# Patient Record
Sex: FEMALE | Race: WHITE | NOT HISPANIC OR LATINO | Employment: FULL TIME | URBAN - METROPOLITAN AREA
[De-identification: names, ages, dates, MRNs, and addresses within clinical notes are randomized per-mention and may not be internally consistent; named-entity substitution may affect disease eponyms.]

---

## 2017-01-26 ENCOUNTER — PATIENT MESSAGE (OUTPATIENT)
Dept: ENDOCRINOLOGY | Facility: CLINIC | Age: 62
End: 2017-01-26

## 2017-01-27 ENCOUNTER — TELEPHONE (OUTPATIENT)
Dept: ENDOCRINOLOGY | Facility: HOSPITAL | Age: 62
End: 2017-01-27

## 2017-01-27 DIAGNOSIS — E23.0 HYPOPITUITARISM: ICD-10-CM

## 2017-01-27 DIAGNOSIS — E27.40 ADRENAL INSUFFICIENCY: Primary | ICD-10-CM

## 2017-02-14 ENCOUNTER — LAB VISIT (OUTPATIENT)
Dept: LAB | Facility: HOSPITAL | Age: 62
End: 2017-02-14
Attending: INTERNAL MEDICINE
Payer: COMMERCIAL

## 2017-02-14 DIAGNOSIS — E23.0 HYPOPITUITARISM: ICD-10-CM

## 2017-02-14 DIAGNOSIS — E27.40 ADRENAL INSUFFICIENCY: ICD-10-CM

## 2017-02-14 LAB
25(OH)D3+25(OH)D2 SERPL-MCNC: 57 NG/ML
ALBUMIN SERPL BCP-MCNC: 3.9 G/DL
ALP SERPL-CCNC: 74 U/L
ALT SERPL W/O P-5'-P-CCNC: 10 U/L
ANION GAP SERPL CALC-SCNC: 8 MMOL/L
AST SERPL-CCNC: 16 U/L
BILIRUB SERPL-MCNC: 0.4 MG/DL
BUN SERPL-MCNC: 21 MG/DL
CALCIUM SERPL-MCNC: 9.3 MG/DL
CHLORIDE SERPL-SCNC: 106 MMOL/L
CO2 SERPL-SCNC: 27 MMOL/L
CORTIS SERPL-MCNC: 4 UG/DL
CREAT SERPL-MCNC: 0.9 MG/DL
EST. GFR  (AFRICAN AMERICAN): >60 ML/MIN/1.73 M^2
EST. GFR  (NON AFRICAN AMERICAN): >60 ML/MIN/1.73 M^2
GLUCOSE SERPL-MCNC: 73 MG/DL
POTASSIUM SERPL-SCNC: 3.8 MMOL/L
PROT SERPL-MCNC: 6.6 G/DL
SODIUM SERPL-SCNC: 141 MMOL/L
T4 FREE SERPL-MCNC: 1.56 NG/DL
TSH SERPL DL<=0.005 MIU/L-ACNC: 3.16 UIU/ML

## 2017-02-14 PROCEDURE — 36415 COLL VENOUS BLD VENIPUNCTURE: CPT

## 2017-02-14 PROCEDURE — 82533 TOTAL CORTISOL: CPT

## 2017-02-14 PROCEDURE — 84244 ASSAY OF RENIN: CPT

## 2017-02-14 PROCEDURE — 84305 ASSAY OF SOMATOMEDIN: CPT

## 2017-02-14 PROCEDURE — 82024 ASSAY OF ACTH: CPT

## 2017-02-14 PROCEDURE — 82306 VITAMIN D 25 HYDROXY: CPT

## 2017-02-14 PROCEDURE — 84443 ASSAY THYROID STIM HORMONE: CPT

## 2017-02-14 PROCEDURE — 80053 COMPREHEN METABOLIC PANEL: CPT

## 2017-02-14 PROCEDURE — 82088 ASSAY OF ALDOSTERONE: CPT

## 2017-02-14 PROCEDURE — 84439 ASSAY OF FREE THYROXINE: CPT

## 2017-02-15 LAB
ALDOST SERPL-MCNC: 3.2 NG/DL
IGF-I SERPL-MCNC: 98 NG/ML

## 2017-02-16 LAB — RENIN PLAS-CCNC: 4.3 NG/ML/H

## 2017-02-17 LAB — ACTH PLAS-MCNC: 16 PG/ML

## 2017-02-21 ENCOUNTER — OFFICE VISIT (OUTPATIENT)
Dept: ENDOCRINOLOGY | Facility: CLINIC | Age: 62
End: 2017-02-21
Payer: COMMERCIAL

## 2017-02-21 VITALS
HEART RATE: 76 BPM | BODY MASS INDEX: 28.61 KG/M2 | DIASTOLIC BLOOD PRESSURE: 78 MMHG | HEIGHT: 60 IN | SYSTOLIC BLOOD PRESSURE: 122 MMHG | WEIGHT: 145.75 LBS

## 2017-02-21 DIAGNOSIS — E27.1 ADRENAL INSUFFICIENCY (ADDISON'S DISEASE): Primary | ICD-10-CM

## 2017-02-21 DIAGNOSIS — D18.03 HEMANGIOMA OF LIVER: ICD-10-CM

## 2017-02-21 DIAGNOSIS — E23.0 PANHYPOPITUITARISM: ICD-10-CM

## 2017-02-21 DIAGNOSIS — M81.8 OSTEOPOROSIS, IDIOPATHIC: ICD-10-CM

## 2017-02-21 DIAGNOSIS — E89.0 HYPOTHYROIDISM, POSTSURGICAL: ICD-10-CM

## 2017-02-21 PROCEDURE — 99999 PR PBB SHADOW E&M-EST. PATIENT-LVL III: CPT | Mod: PBBFAC,,, | Performed by: INTERNAL MEDICINE

## 2017-02-21 PROCEDURE — 99214 OFFICE O/P EST MOD 30 MIN: CPT | Mod: S$GLB,,, | Performed by: INTERNAL MEDICINE

## 2017-02-21 RX ORDER — CHOLECALCIFEROL (VITAMIN D3) 25 MCG
10000 TABLET ORAL DAILY
COMMUNITY

## 2017-02-21 RX ORDER — LEVOTHYROXINE SODIUM 88 UG/1
88 TABLET ORAL DAILY
Qty: 30 TABLET | Refills: 11 | Status: SHIPPED | OUTPATIENT
Start: 2017-02-21 | End: 2018-02-19 | Stop reason: SDUPTHER

## 2017-02-21 RX ORDER — PREDNISONE 5 MG/1
TABLET ORAL
Qty: 48 TABLET | Refills: 11 | Status: SHIPPED | OUTPATIENT
Start: 2017-02-21 | End: 2017-03-07

## 2017-02-21 RX ORDER — ALENDRONATE SODIUM 70 MG/1
TABLET ORAL
Qty: 4 TABLET | Refills: 12 | Status: SHIPPED | OUTPATIENT
Start: 2017-02-21 | End: 2018-02-19 | Stop reason: SDUPTHER

## 2017-02-21 RX ORDER — FLUDROCORTISONE ACETATE 0.1 MG/1
100 TABLET ORAL DAILY
Qty: 30 TABLET | Refills: 11 | Status: SHIPPED | OUTPATIENT
Start: 2017-02-21 | End: 2018-02-19 | Stop reason: SDUPTHER

## 2017-02-21 NOTE — PATIENT INSTRUCTIONS
Endocrine  Bilateral adrenalectomy and Pituitary radiation  Thyroidectomy  Currently FSH normal last year  TSH normal   ACTH perhaps low  IGF1 is now normal  In past Growth def  Empty sella with MRI 2012  Continue prednisone 5 and occasionally 7.5  Florinef .1 3days per week or .05 4 days per week  Levothyroxine 88mcg    Osteoporosis recent foot fracture   To check BMD a    DHEA-S she feels much better and may help bone, but certainly the mood    Hemangioma liver PCP    Restless legs still a problem

## 2017-02-21 NOTE — PROGRESS NOTES
Subjective:      Patient ID: Tess Duncan is a 62 y.o. female.    Chief Complaint:  Adrenal Insufficiency      History of Present Illness   Patient was feeling disconnected and now taking DHEA-S 10-15 and now feeling much better  Recent fracture right foot and slow healing  Tingling legs both when seeing at night. Improves with walking    The patient has the following problems:   1. Status post Cushing's disease with bilateral adrenalectomy.   2. Radiation to the pituitary to avoid Dakota syndrome.   3. Growth hormone deficiency. Last IGF1 55  4. Menopause. Age 55 y/o   5. Osteoporosis and scoliosis.   6. Status post thyroidectomy for multinodular goiter and hypothyroidism   and exogenous hyperthyroidism. Father with thyroid cancer   7. Large hemangioma in the liver. Checked 2014 no change   8 Hypoglycemia thinks can tell and takes higher dose of prednisone   9. Depression. Markedly improved.   10. Restless legs  In regards to the Cushing's and bilateral adrenalectomy, she is currently   taking prednisone 5- 7.5 mg per day mg per day. We have stoppedthe growth hormone and rechecked the growth   hormone levels which were low normal. Osteoporosis and No fractures.  Not taking alendronate    Review of Systems   Constitutional: Negative for fatigue and unexpected weight change.   HENT: Negative for hearing loss.    Eyes: Negative for visual disturbance.   Respiratory: Negative for cough and shortness of breath.    Cardiovascular: Negative for chest pain and palpitations.   Gastrointestinal: Negative for constipation and diarrhea.   Neurological: Negative for headaches.   Psychiatric/Behavioral: The patient is not nervous/anxious.        Objective:   Physical Exam   Constitutional: She is oriented to person, place, and time. She appears well-developed and well-nourished.   Neck: No thyromegaly present.   Cardiovascular: Normal rate, regular rhythm and normal heart sounds.    No murmur heard.  Pulmonary/Chest: Effort  normal and breath sounds normal.   Abdominal: Soft. Bowel sounds are normal.   Musculoskeletal:   Boot right foot   Lymphadenopathy:     She has no cervical adenopathy.   Neurological: She is alert and oriented to person, place, and time. She has normal reflexes.   Vibration intact left foot  Pulses normal   Vitals reviewed.      Lab Review:   Results for orders placed or performed in visit on 02/14/17   ACTH   Result Value Ref Range    ACTH 16 0 - 46 pg/mL   Renin   Result Value Ref Range    Renin Activity 4.3 ng/mL/h   Aldosterone   Result Value Ref Range    Aldosterone 3.2 ng/dL   Cortisol   Result Value Ref Range    Cortisol 4.0 ug/dL   Insulin-like growth factor   Result Value Ref Range    Somatomedin (IGF-I) 98 81 - 225 ng/mL   T4, free   Result Value Ref Range    Free T4 1.56 (H) 0.71 - 1.51 ng/dL   TSH   Result Value Ref Range    TSH 3.156 0.400 - 4.000 uIU/mL   Comprehensive metabolic panel   Result Value Ref Range    Sodium 141 136 - 145 mmol/L    Potassium 3.8 3.5 - 5.1 mmol/L    Chloride 106 95 - 110 mmol/L    CO2 27 23 - 29 mmol/L    Glucose 73 70 - 110 mg/dL    BUN, Bld 21 (H) 6 - 20 mg/dL    Creatinine 0.9 0.5 - 1.4 mg/dL    Calcium 9.3 8.7 - 10.5 mg/dL    Total Protein 6.6 6.0 - 8.4 g/dL    Albumin 3.9 3.5 - 5.2 g/dL    Total Bilirubin 0.4 0.1 - 1.0 mg/dL    Alkaline Phosphatase 74 55 - 135 U/L    AST 16 10 - 40 U/L    ALT 10 10 - 44 U/L    Anion Gap 8 8 - 16 mmol/L    eGFR if African American >60.0 >60 mL/min/1.73 m^2    eGFR if non African American >60.0 >60 mL/min/1.73 m^2   Vitamin D   Result Value Ref Range    Vit D, 25-Hydroxy 57 30 - 96 ng/mL         Assessment:     Endocrine  Bilateral adrenalectomy and Pituitary radiation  Thyroidectomy  Currently FSH normal last year  TSH normal   ACTH perhaps low  IGF1 is now normal  In past Growth def  Empty sella with MRI 2012  Continue prednisone 5 and occasionally 7.5  Florinef .1 3days per week or .05 4 days per week  Levothyroxine  88mcg    Osteoporosis recent foot fracture   To check BMD  Then decide about adding alendronate    DHEA-S she feels much better and may help bone, but certainly the mood    Hemangioma liver PCP    Restless legs still a problem      F/U Dr. Fitch    Plan:     Orders Placed This Encounter   Procedures    DXA Bone Density Spine And Hip_Axial Skeleton     Standing Status:   Future     Standing Expiration Date:   2/21/2018    MRI Brain W WO Contrast     Standing Status:   Future     Standing Expiration Date:   2/21/2018     Order Specific Question:   Does the patient have a pacemaker or a defibrilator?     Answer:   No     Order Specific Question:   Does the patient have a cerebral aneurysm or surgical clip, pump, nerve or brain stimulator, middle or inner ear prosthesis, or other metal implant or  been injured by a metal object(i.e. bullet, bb, shrapnel)?     Answer:   No     Order Specific Question:   Is the patient claustrophobic?     Answer:   No     Order Specific Question:   Will the patient require sedation?     Answer:   No     Order Specific Question:   Does the patient have any of the following conditions? Diabetes, History of Renal Disease or Hypertension requiring medical therapy?     Answer:   No     Order Specific Question:   Is the patient pregnant?     Answer:   No     Order Specific Question:   May the Radiologist modify the order per protocol to meet the clinical needs of the patient?     Answer:   Yes     Order Specific Question:   Is this part of a Research Study?     Answer:   No     Order Specific Question:   Recist criteria?     Answer:   No     Order Specific Question:   Will this service be billed to a Worker's Comp policy?     Answer:   No     Order Specific Question:   Does the patient have on a skin patch for medication with aluminized backing?     Answer:   No    Comprehensive metabolic panel     Standing Status:   Future     Standing Expiration Date:   2/21/2018    Cortisol     Standing  Status:   Future     Standing Expiration Date:   2/21/2018    ACTH     Standing Status:   Future     Standing Expiration Date:   2/21/2018    TSH     Standing Status:   Future     Standing Expiration Date:   2/21/2018    T4, free     Standing Status:   Future     Standing Expiration Date:   2/21/2018    Renin     Standing Status:   Future     Standing Expiration Date:   2/21/2018    Follicle stimulating hormone     Standing Status:   Future     Standing Expiration Date:   2/21/2018    Prolactin     Standing Status:   Future     Standing Expiration Date:   2/21/2018    Insulin-like growth factor     Standing Status:   Future     Standing Expiration Date:   2/21/2018

## 2017-02-21 NOTE — MR AVS SNAPSHOT
[facility] - Endo/Diab/Metab  [address]  [facility] [address]  Phone: [phone]  Fax: [phone]                  Tess Duncan   2017 7:00 AM   Office Visit    Description:  Female : 1955   Provider:  Fco Pompa MD   Department:  [facility] - Endo/Diab/Metab           Reason for Visit     Adrenal Insufficiency           Diagnoses this Visit        Comments    Adrenal insufficiency (Peach's disease)    -  Primary     Panhypopituitarism         Osteoporosis, idiopathic         Hemangioma of liver         Hypothyroidism, postsurgical                To Do List           Future Appointments        Provider Department Dept Phone    2017 3:20 PM NOM, DEXA1 [facility]-Bone [facility]eral Density [phone]    3/3/2017 8:15 AM [facility] MRI WIDE BORE [facility] [phone]      Goals (5 Years of Data)     None      Follow-Up and Disposition     Follow-up and Disposition History       These Medications        Disp Refills Start End    alendronate (FOSAMAX) 70 MG tablet 4 tablet 12 2017     1 tablet on  with a full glass of water and wait 30 minutes for breakfast and pills. Take sitting or standing    Pharmacy: [facility] [address] AT [address] [place] [address] Ph #: [phone]       predniSONE (DELTASONE) 5 MG tablet 48 tablet 11 2017     1 tab in AM and 1/2 tab in PM    Pharmacy: [address] AT [address] [place] [address] Ph #: [phone]       levothyroxine (SYNTHROID) 88 MCG tablet 30 tablet 11 2017    Take 1 tablet (88 mcg total) by mouth once daily. - Oral    Pharmacy: [address] AT [address] [place] [address] Ph #: [phone]       fludrocortisone (FLORINEF) 0.1 mg Tab 30 tablet 11 2017     Take 1 tablet (100 mcg total) by mouth once daily. - Oral    Pharmacy: [facility]  [address] [place], [place] - [address] AT [address] [place]  [address] Ph #: [phone]         [facility] On Call     [facility] On Call Nurse Care Line -  Assistance  Registered nurses in the [facility] On Call Center provide clinical advisement, health education, appointment booking, and other advisory services.  Call for this free service at [phone].             Medications           Message regarding Medications     Verify the changes and/or additions to your medication regime listed below are the same as discussed with your clinician today.  If any of these changes or additions are incorrect, please notify your healthcare provider.        START taking these NEW medications        Refills    alendronate (FOSAMAX) 70 MG tablet 12    Si tablet on  with a full glass of water and wait 30 minutes for breakfast and pills. Take sitting or standing    Class: Normal      CHANGE how you are taking these medications     Start Taking Instead of    fludrocortisone (FLORINEF) 0.1 mg Tab fludrocortisone (FLORINEF) 0.1 mg Tab    Dosage:  Take 1 tablet (100 mcg total) by mouth once daily. Dosage:  TAKE ONE TABLET BY MOUTH EVERY DAY.    Reason for Change:  Reorder            Verify that the below list of medications is an accurate representation of the medications you are currently taking.  If none reported, the list may be blank. If incorrect, please contact your healthcare provider. Carry this list with you in case of emergency.           Current Medications     calcium carbonate (CORAL CALCIUM) 390 mg  (1,000 mg) Tab Take by mouth.    fludrocortisone (FLORINEF) 0.1 mg Tab Take 1 tablet (100 mcg total) by mouth once daily.    L.acidophilus-Bif. animalis (PROBIOTIC) 5 billion cell CpSP Take by mouth once daily.    levothyroxine (SYNTHROID) 88 MCG tablet Take 1 tablet (88 mcg total) by mouth once daily.    multivitamin capsule Take 1 capsule by mouth once daily.    predniSONE (DELTASONE) 5 MG tablet 1 tab in AM and  1/2 tab in PM    vitamin D 1000 units Tab Take 185 mg by mouth once daily.    alendronate (FOSAMAX) 70 MG tablet 1 tablet on Sunday with a full glass of water and wait 30 minutes for breakfast and pills. Take sitting or standing           Clinical Reference Information           Your Vitals Were     BP Pulse Height Weight BMI    122/78 76 5' (1.524 m) 66.1 kg (145 lb 11.6 oz) 28.46 kg/m2      Blood Pressure          Most Recent Value    BP  122/78      Allergies as of 2/21/2017     Aleve  [Naproxen Sodium]    Codeine    Iodinated Contrast Media - Iv Dye    Pcn [Penicillins]    Phenytoin Sodium Extended      Immunizations Administered on Date of Encounter - 2/21/2017     None      Orders Placed During Today's Visit     Future Labs/Procedures Expected by Expires    ACTH  2/21/2017 2/21/2018    Comprehensive metabolic panel  2/21/2017 2/21/2018    Cortisol  2/21/2017 2/21/2018    DXA Bone Density Spine And Hip_Axial Skeleton  2/21/2017 2/21/2018    Follicle stimulating hormone  2/21/2017 2/21/2018    Insulin-like growth factor  2/21/2017 2/21/2018    MRI Brain W WO Contrast  2/21/2017 2/21/2018    Prolactin  2/21/2017 2/21/2018    Renin  2/21/2017 2/21/2018    T4, free  2/21/2017 2/21/2018    TSH  2/21/2017 2/21/2018      Instructions    Endocrine  Bilateral adrenalectomy and Pituitary radiation  Thyroidectomy  Currently FSH normal last year  TSH normal   ACTH perhaps low  IGF1 is now normal  In past Growth def  Empty sella with MRI 2012  Continue prednisone 5 and occasionally 7.5  Florinef .1 3days per week or .05 4 days per week  Levothyroxine 88mcg    Osteoporosis recent foot fracture   To check BMD a    DHEA-S she feels much better and may help bone, but certainly the mood    Hemangioma liver PCP    Restless legs still a problem       Language Assistance Services     ATTENTION: Language assistance services are available, free of charge. Please call [phone].      ATENCIÓN: anabel Gerber  disposición servicios gratuitos de asistencia lingüística. Swapnil al [phone].     JILLIAN Ý: N?u b?n nói Ti?ng Vi?t, có các d?ch v? h? tr? ngôn ng? mi?n phí dành cho b?n. G?i s? [phone].         [facility] complies with applicable Federal civil rights laws and does not discriminate on the basis of race, color, national origin, age, disability, or sex.

## 2017-02-23 ENCOUNTER — HOSPITAL ENCOUNTER (OUTPATIENT)
Dept: RADIOLOGY | Facility: CLINIC | Age: 62
Discharge: HOME | End: 2017-02-23
Attending: INTERNAL MEDICINE
Payer: COMMERCIAL

## 2017-02-23 ENCOUNTER — PATIENT MESSAGE (OUTPATIENT)
Dept: ENDOCRINOLOGY | Facility: CLINIC | Age: 62
End: 2017-02-23

## 2017-02-23 DIAGNOSIS — M81.8 OSTEOPOROSIS, IDIOPATHIC: ICD-10-CM

## 2017-02-23 PROCEDURE — 77080 DXA BONE DENSITY AXIAL: CPT | Mod: TC

## 2017-02-23 PROCEDURE — 77080 DXA BONE DENSITY AXIAL: CPT | Mod: 26,,, | Performed by: INTERNAL MEDICINE

## 2017-03-01 ENCOUNTER — PATIENT MESSAGE (OUTPATIENT)
Dept: ENDOCRINOLOGY | Facility: CLINIC | Age: 62
End: 2017-03-01

## 2017-03-01 DIAGNOSIS — I67.1 MIDDLE CEREBRAL ANEURYSM: Primary | ICD-10-CM

## 2017-03-03 ENCOUNTER — HOSPITAL ENCOUNTER (OUTPATIENT)
Dept: RADIOLOGY | Facility: HOSPITAL | Age: 62
Discharge: HOME | End: 2017-03-03
Attending: INTERNAL MEDICINE
Payer: COMMERCIAL

## 2017-03-03 ENCOUNTER — TELEPHONE (OUTPATIENT)
Dept: ENDOCRINOLOGY | Facility: CLINIC | Age: 62
End: 2017-03-03

## 2017-03-03 DIAGNOSIS — I67.1 ANEURYSM, CEREBRAL: Primary | ICD-10-CM

## 2017-03-03 DIAGNOSIS — E23.0 PANHYPOPITUITARISM: ICD-10-CM

## 2017-03-03 PROCEDURE — 70553 MRI BRAIN STEM W/O & W/DYE: CPT | Mod: TC

## 2017-03-03 PROCEDURE — 25500020 PHARM REV CODE 255: Performed by: INTERNAL MEDICINE

## 2017-03-03 PROCEDURE — 70553 MRI BRAIN STEM W/O & W/DYE: CPT | Mod: 26,,, | Performed by: RADIOLOGY

## 2017-03-03 PROCEDURE — A9585 GADOBUTROL INJECTION: HCPCS | Performed by: INTERNAL MEDICINE

## 2017-03-03 RX ORDER — GADOBUTROL 604.72 MG/ML
4 INJECTION INTRAVENOUS
Status: COMPLETED | OUTPATIENT
Start: 2017-03-03 | End: 2017-03-03

## 2017-03-03 RX ADMIN — GADOBUTROL 4 ML: 604.72 INJECTION INTRAVENOUS at 09:03

## 2017-03-03 NOTE — TELEPHONE ENCOUNTER
Please note patient has allergy to radiology dye which is severe with hives etc.  Need to talk with radiology about steroid prep etc and any other alternative.  She had urticaria even with steroid prep lasttime.  Cancel the order that said no allergies and schedule the one with allergies and contact radiologist about test for this patient

## 2017-03-03 NOTE — TELEPHONE ENCOUNTER
Unable to reach on mobile phone.  New finding of possible thrombosed aneurysm.  To order CT angiogram of brain  Check with radiology correct test

## 2017-03-07 ENCOUNTER — TELEPHONE (OUTPATIENT)
Dept: ENDOCRINOLOGY | Facility: CLINIC | Age: 62
End: 2017-03-07

## 2017-03-07 ENCOUNTER — PATIENT MESSAGE (OUTPATIENT)
Dept: ENDOCRINOLOGY | Facility: CLINIC | Age: 62
End: 2017-03-07

## 2017-03-07 RX ORDER — PREDNISONE 5 MG/1
TABLET ORAL
Qty: 48 TABLET | Refills: 6 | Status: SHIPPED | OUTPATIENT
Start: 2017-03-07 | End: 2018-02-19 | Stop reason: SDUPTHER

## 2017-03-07 NOTE — TELEPHONE ENCOUNTER
"Spoke with Radiology pt has to take "50mg Prednisone 13 hrs prior, 50mg Prednisone 7hrs prior, and 50mg Prednisone 1hour prior, plus 50mg Benedryl 1 hour prior to Ct Scan on 3/15 at 5pm. LVM for pt that med would be sent to her preferred pharmacy,[facility]. Msg forwarded to Dr. Pompa. DH  "

## 2017-03-08 RX ORDER — PREDNISONE 50 MG/1
TABLET ORAL
Qty: 3 TABLET | Refills: 0 | Status: SHIPPED | OUTPATIENT
Start: 2017-03-08 | End: 2017-03-30 | Stop reason: ALTCHOICE

## 2017-03-13 ENCOUNTER — PATIENT MESSAGE (OUTPATIENT)
Dept: ENDOCRINOLOGY | Facility: CLINIC | Age: 62
End: 2017-03-13

## 2017-03-15 ENCOUNTER — HOSPITAL ENCOUNTER (OUTPATIENT)
Dept: RADIOLOGY | Facility: HOSPITAL | Age: 62
Discharge: HOME | End: 2017-03-15
Attending: INTERNAL MEDICINE
Payer: COMMERCIAL

## 2017-03-15 DIAGNOSIS — I67.1 MIDDLE CEREBRAL ANEURYSM: ICD-10-CM

## 2017-03-15 LAB
CREAT SERPL-MCNC: 0.7 MG/DL (ref 0.5–1.4)
SAMPLE: NORMAL

## 2017-03-15 PROCEDURE — 70498 CT ANGIOGRAPHY NECK: CPT | Mod: TC

## 2017-03-15 PROCEDURE — 70498 CT ANGIOGRAPHY NECK: CPT | Mod: 26,,, | Performed by: RADIOLOGY

## 2017-03-15 PROCEDURE — 70496 CT ANGIOGRAPHY HEAD: CPT | Mod: 26,,, | Performed by: RADIOLOGY

## 2017-03-15 PROCEDURE — 25500020 PHARM REV CODE 255: Performed by: INTERNAL MEDICINE

## 2017-03-15 PROCEDURE — 70496 CT ANGIOGRAPHY HEAD: CPT | Mod: TC

## 2017-03-15 RX ADMIN — IOHEXOL 75 ML: 350 INJECTION, SOLUTION INTRAVENOUS at 07:03

## 2017-03-16 ENCOUNTER — TELEPHONE (OUTPATIENT)
Dept: ENDOCRINOLOGY | Facility: CLINIC | Age: 62
End: 2017-03-16

## 2017-03-16 ENCOUNTER — PATIENT MESSAGE (OUTPATIENT)
Dept: ENDOCRINOLOGY | Facility: CLINIC | Age: 62
End: 2017-03-16

## 2017-03-16 DIAGNOSIS — I72.9 ANEURYSM: Primary | ICD-10-CM

## 2017-03-16 NOTE — TELEPHONE ENCOUNTER
The following lab tests are abnormal:  The CTA does not suggest aneurysm.  To have you see neurosurgeon to get their opinion

## 2017-03-24 ENCOUNTER — TELEPHONE (OUTPATIENT)
Dept: NEUROSURGERY | Facility: CLINIC | Age: 62
End: 2017-03-24

## 2017-03-24 NOTE — TELEPHONE ENCOUNTER
----- Message from Moe Bauer sent at 3/24/2017 10:02 AM CDT -----  Contact: PT  Deyvi green, [phone]

## 2017-03-30 ENCOUNTER — OFFICE VISIT (OUTPATIENT)
Dept: NEUROSURGERY | Facility: CLINIC | Age: 62
End: 2017-03-30
Payer: COMMERCIAL

## 2017-03-30 VITALS
HEART RATE: 93 BPM | BODY MASS INDEX: 27.93 KG/M2 | SYSTOLIC BLOOD PRESSURE: 135 MMHG | TEMPERATURE: 99 F | DIASTOLIC BLOOD PRESSURE: 87 MMHG | WEIGHT: 143 LBS

## 2017-03-30 DIAGNOSIS — R20.0 BILATERAL LEG NUMBNESS: ICD-10-CM

## 2017-03-30 DIAGNOSIS — I67.1 CEREBRAL ANEURYSM: Primary | ICD-10-CM

## 2017-03-30 PROCEDURE — 1160F RVW MEDS BY RX/DR IN RCRD: CPT | Mod: S$GLB,,, | Performed by: NEUROLOGICAL SURGERY

## 2017-03-30 PROCEDURE — 99204 OFFICE O/P NEW MOD 45 MIN: CPT | Mod: S$GLB,,, | Performed by: NEUROLOGICAL SURGERY

## 2017-03-30 PROCEDURE — 99999 PR PBB SHADOW E&M-EST. PATIENT-LVL III: CPT | Mod: PBBFAC,,, | Performed by: NEUROLOGICAL SURGERY

## 2017-03-30 NOTE — LETTER
March 30, 2017      Fco Pompa MD  [address]  [facility] [address]           [facility]  [address]  [facility] [address]  Phone: [phone]          Patient: Tess Duncan   MR Number: [id number]   YOB: 1955   Date of Visit: 3/30/2017       Dear Dr. Fco Pompa:    Thank you for referring Tess Duncan to me for evaluation. Attached you will find relevant portions of my assessment and plan of care.    If you have questions, please do not hesitate to call me. I look forward to following Tess Duncan along with you.    Sincerely,    Luca Alan MD    Enclosure  CC:  No Recipients    If you would like to receive this communication electronically, please contact [email or web address] or [phone] to request more information on [employer] access.    For providers and/or their staff who would like to refer a patient to [facility], please contact us through our one-stop-shop provider referral line, [facility] [place], at [phone].    If you feel you have received this communication in error or would no longer like to receive these types of communications, please e-mail [email or web address]

## 2017-03-30 NOTE — MR AVS SNAPSHOT
[facility] - Neuro[facility] 7th Fl  [address]  [facility] [address]  Phone: [phone]                  Tess Duncan   3/30/2017 9:00 AM   Office Visit    Description:  Female : 1955   Provider:  Luca Alan MD   Department:  [facility] - Neurosurgery 7th Fl           Diagnoses this Visit        Comments    Cerebral aneurysm    -  Primary            To Do List           Future Appointments        Provider Department Dept Phone    3/31/2017 8:40 AM Julia Crawley MD [facility] Internal Medicine [phone]      Goals (5 Years of Data)     None      Follow-Up and Disposition     Return in about 6 months (around 2017) for MRI, MRA brain.      [facility] On Call     [facility] On Call Nurse Care Line -  Assistance  Unless otherwise directed by your provider, please contact [facility] On-Call, our nurse care line that is available for  assistance.     Registered nurses in the [facility] On Call Center provide: appointment scheduling, clinical advisement, health education, and other advisory services.  Call: [phone] (toll free)               Medications           Message regarding Medications     Verify the changes and/or additions to your medication regime listed below are the same as discussed with your clinician today.  If any of these changes or additions are incorrect, please notify your healthcare provider.        STOP taking these medications     diphenhydrAMINE (BENADRYL) 50 MG tablet 1 tablet 1 hour before CT scan           Verify that the below list of medications is an accurate representation of the medications you are currently taking.  If none reported, the list may be blank. If incorrect, please contact your healthcare provider. Carry this list with you in case of emergency.           Current Medications     alendronate (FOSAMAX) 70 MG tablet 1 tablet on  with a full glass of water and wait 30 minutes for breakfast and pills. Take sitting or standing     calcium carbonate (CORAL CALCIUM) 390 mg  (1,000 mg) Tab Take by mouth.    fludrocortisone (FLORINEF) 0.1 mg Tab Take 1 tablet (100 mcg total) by mouth once daily.    L.acidophilus-Bif. animalis (PROBIOTIC) 5 billion cell CpSP Take by mouth once daily.    levothyroxine (SYNTHROID) 88 MCG tablet Take 1 tablet (88 mcg total) by mouth once daily.    multivitamin capsule Take 1 capsule by mouth once daily.    predniSONE (DELTASONE) 5 MG tablet TAKE 1 TABLET BY MOUTH EVERY MORNING AND 1/2 TABLET EVERY EVENING    vitamin D 1000 units Tab Take 185 mg by mouth once daily.           Clinical Reference Information           Your Vitals Were     BP Pulse Temp Weight BMI    135/87 93 98.7 °F (37.1 °C) (Oral) 64.9 kg (143 lb) 27.93 kg/m2      Blood Pressure          Most Recent Value    BP  135/87      Allergies as of 3/30/2017     Iodinated Contrast Media - Iv Dye    Aleve  [Naproxen Sodium]    Codeine    Pcn [Penicillins]    Phenytoin Sodium Extended      Immunizations Administered on Date of Encounter - 3/30/2017     None      Orders Placed During Today's Visit     Future Labs/Procedures Expected by Expires    Creatinine, serum  3/30/2017 5/29/2018    MRA Brain with and without contrast  3/30/2017 3/30/2018    MRI Brain W WO Contrast  9/30/2017 3/30/2018      Language Assistance Services     ATTENTION: Language assistance services are available, free of charge. Please call [phone].      ATENCIÓN: Si habla español, tiene a harley disposición servicios gratuitos de asistencia lingüística. Llame al [phone].     [facility] Ý: N?u b?n nói Ti?ng Vi?t, có các d?ch v? h? tr? ngôn ng? mi?n phí dành cho b?n. G?i s? [phone].         [facility] [address] complies with applicable Federal civil rights laws and does not discriminate on the basis of race, color, national origin, age, disability, or sex.

## 2017-03-30 NOTE — PROGRESS NOTES
This office note has been dictated.  March 30, 2017    Fco Pompa M.D.  Department of Endocrinology  [facility]    RE:  TESS DUNCAN.  [facility]  [id number]    Dear Fco:    Tess Duncan was seen in neurosurgical consultation at the office this   morning.  As you know, she is a 62-year-old lady who was diagnosed with Cushing   disease in 1972.  She underwent pneumoencephalography at [place] and   subsequently underwent bilateral adrenalectomy and pituitary radiation.  She was   placed on Florinef and prednisone, which she has taken for all of these years.   However, she continued to have menstrual periods and did not go through   menopause until after she was 52.  She did develop thyroid nodules and had a   thyroidectomy.  You have followed her for many years and have been getting an   MRI of the brain to monitor the pituitary about every five years.  Her most   recent followup MRI was done on 03/03/17 showing a possible vascular lesion.  A   CTA was then done and she was referred for neurosurgical evaluation.  She has   had no significant headaches.  Vision is stable.  She has had cataract surgery.    She has no diplopia.  She feels her hearing is adequate.  She has had no   problems with speech or focal weakness.  She does complain of subjective   numbness, which began in her feet, but now is going up further on both legs.  It   is not a pins and needles paresthesia, but more of a sense of decreased   sensation.  There has been no definite weakness in the lower extremities.    Bladder and bowel function is apparently normal, although she has had repeated   urinary tract infections.  Past medical history is otherwise generally   unremarkable.  She has a known hemangioma of the liver.  She remains active and   review of systems is otherwise negative.    On neurological examination, she is a well-developed, well-nourished white lady   who is alert and cooperative.  Examination of the  head shows no tenderness over   the scalp.  Eyes show full extraocular movements.  There is no nystagmus.    Pupils are equal and reactive to light.  Fundi show clear disc margins.  Hearing   is intact to finger rubbing.  The neck is supple.  On neurological examination,   she is speaking clearly, provides a good history.  Affect is unremarkable.    Finger-to-nose was done well.  Gait is unremarkable.  Cranial nerves are   otherwise intact.  She has normal facial sensation and movement.  The tongue   protrudes in the midline.  She shows good strength in the extremities, including   individual muscle groups of the lower extremities.  There is no clear loss of   sensation in the legs to touch today.  Deep tendon reflexes are quite brisk and   symmetrical except the Achilles, which are decreased bilaterally.    MRI of the brain was done at [facility] on 03/03/17.  There is a signal   intense spherical lesion just above the sylvian fissure on the left side, close   to the middle cerebral artery.  This is signal intense on T1 and on T2.  There   is perhaps a little contrast enhancement at the base.  CTA was then done on   03/15/17, the middle cerebral artery appears unremarkable.  There is a little   vascularity in the area of noted abnormality, but no clear pathology.  I   reviewed the MRI of the brain, done on 10/29/12. A small lesion is noted at the   same location, it is only 2 or 3 mm at that point in time.  This could represent   a cavernous malformation or a thrombosed aneurysm.    IMPRESSION:  Cavernous malformation versus thrombosed left middle cerebral   artery aneurysm.    RECOMMENDATIONS:  At this point, there is no true aneurysm demonstrated.  We   discussed cerebral angiography.  I believe at this point, we can have a followup   MRI done in about six months to see if there are any changes in this lesion.    The area of the pituitary is unchanged.    Thank you very much for the opportunity to see her in  neurosurgical   consultation.    Sincerely yours,      RDS/HN  dd: 03/30/2017 10:24:38 (CDT)  td: 03/31/2017 04:04:19 (CDT)  Doc ID   #[id number]  Job ID #[id number]    CC: Fco Duncan

## 2017-03-31 ENCOUNTER — LAB VISIT (OUTPATIENT)
Dept: LAB | Facility: OTHER | Age: 62
End: 2017-03-31
Attending: INTERNAL MEDICINE
Payer: COMMERCIAL

## 2017-03-31 ENCOUNTER — TELEPHONE (OUTPATIENT)
Dept: INTERNAL MEDICINE | Facility: CLINIC | Age: 62
End: 2017-03-31

## 2017-03-31 ENCOUNTER — OFFICE VISIT (OUTPATIENT)
Dept: INTERNAL MEDICINE | Facility: CLINIC | Age: 62
End: 2017-03-31
Attending: INTERNAL MEDICINE
Payer: COMMERCIAL

## 2017-03-31 VITALS
OXYGEN SATURATION: 99 % | HEART RATE: 88 BPM | SYSTOLIC BLOOD PRESSURE: 108 MMHG | BODY MASS INDEX: 27.94 KG/M2 | WEIGHT: 143.06 LBS | DIASTOLIC BLOOD PRESSURE: 78 MMHG

## 2017-03-31 DIAGNOSIS — R20.0 LOWER EXTREMITY NUMBNESS: Primary | ICD-10-CM

## 2017-03-31 DIAGNOSIS — M41.9 SCOLIOSIS, UNSPECIFIED SCOLIOSIS TYPE, UNSPECIFIED SPINAL REGION: ICD-10-CM

## 2017-03-31 DIAGNOSIS — R20.0 LOWER EXTREMITY NUMBNESS: ICD-10-CM

## 2017-03-31 DIAGNOSIS — R79.89 LOW VITAMIN B12 LEVEL: Primary | ICD-10-CM

## 2017-03-31 DIAGNOSIS — M81.8 OSTEOPOROSIS, IDIOPATHIC: ICD-10-CM

## 2017-03-31 LAB — VIT B12 SERPL-MCNC: 308 PG/ML

## 2017-03-31 PROCEDURE — 99214 OFFICE O/P EST MOD 30 MIN: CPT | Mod: S$GLB,,, | Performed by: INTERNAL MEDICINE

## 2017-03-31 PROCEDURE — 1160F RVW MEDS BY RX/DR IN RCRD: CPT | Mod: S$GLB,,, | Performed by: INTERNAL MEDICINE

## 2017-03-31 PROCEDURE — 36415 COLL VENOUS BLD VENIPUNCTURE: CPT

## 2017-03-31 PROCEDURE — 82607 VITAMIN B-12: CPT

## 2017-03-31 PROCEDURE — 99999 PR PBB SHADOW E&M-EST. PATIENT-LVL III: CPT | Mod: PBBFAC,,, | Performed by: INTERNAL MEDICINE

## 2017-03-31 NOTE — PROGRESS NOTES
"Subjective:       Patient ID: Tess Duncan is a 62 y.o. female.    Chief Complaint: Numbness    HPI   Pt here for f/u.     Reports numbness of LE x 4-6 months.   Reports was told by tech that there was abnormal area on L1 on recent bone density and that it was there 2 years ago.   Recently seen by nsx for abnormal MRA - c/f aneurysm - note has been dictacted but not transcribed yet. Plan per pt is to repeat MRI/MRA in 6 months but suspicion is low for aneurysm. Has hx of hemangioma of liver.      She has noticed increased frequency of "pre-numbness" and pins and needles of B LE. Located at bilateral feet x 4-6 months ago. Occurring at night at feet and has worsened over this period as it is now occurring higher at more proximal legs. Denies weakness or back pain. Sensation will move up 1 or both legs when turning over in bed. Reports this is now occurring when sitting at work. Occurs most often when lying down and sitting. Denies urge to move legs when this occurs.   No weakness, tripping, falls.   No saddle paraesthesias,incontinence, dysarthria, dysphagia, facial asymmetry, gait disturbance.   No trauma or inciting event. No fevers, chills, unexplained wt loss  Has scoliolosis and hx of porosis on alendronate.     Review of Systems   Constitutional: Negative for activity change and unexpected weight change.   HENT: Negative for hearing loss, rhinorrhea and trouble swallowing.    Eyes: Negative for discharge and visual disturbance.   Respiratory: Negative for chest tightness and wheezing.    Cardiovascular: Negative for chest pain and palpitations.   Gastrointestinal: Negative for blood in stool, constipation, diarrhea and vomiting.   Endocrine: Negative for polydipsia and polyuria.   Genitourinary: Negative for difficulty urinating, dysuria, hematuria and menstrual problem.   Musculoskeletal: Negative for arthralgias and joint swelling.   Neurological: Negative for weakness and headaches. "   Psychiatric/Behavioral: Negative for confusion and dysphoric mood.       Objective:      Physical Exam   Constitutional: She is oriented to person, place, and time. She appears well-developed and well-nourished.   HENT:   Head: Normocephalic and atraumatic.   Eyes: Conjunctivae and EOM are normal.   Neck: Neck supple.   Cardiovascular: Normal rate, regular rhythm, normal heart sounds and intact distal pulses.    Pulmonary/Chest: Effort normal and breath sounds normal.   Abdominal: Soft. Normal appearance and bowel sounds are normal.   Musculoskeletal: She exhibits no edema or tenderness.   Sensation in tact Jorge LE  No spinal tto   Lymphadenopathy:     She has no cervical adenopathy.   Neurological: She is alert and oriented to person, place, and time. She has normal strength. She displays no atrophy, no tremor and normal reflexes. No sensory deficit. She exhibits normal muscle tone. Coordination and gait normal.   Reflex Scores:       Patellar reflexes are 2+ on the right side and 2+ on the left side.       Achilles reflexes are 2+ on the right side and 2+ on the left side.  Skin: Skin is warm, dry and intact. No cyanosis. Nails show no clubbing.   Psychiatric: She has a normal mood and affect. Her speech is normal and behavior is normal. Judgment and thought content normal. Cognition and memory are normal.       Assessment:       Tess was seen today for numbness.    Diagnoses and all orders for this visit:    Lower extremity numbness: et unclear. Pt recently had MRI and no evidence of MS. Check MRI of lumbar spine and lab. If neg will refer to neuro for opinion.   -     MRI Lumbar Spine Without Contrast; Future  -     Vitamin B12; Future    Scoliosis, unspecified scoliosis type, unspecified spinal region: as above, denies back pain at this time    Osteoporosis, idiopathic: damari alendronate as ordered by endocrine

## 2017-03-31 NOTE — MR AVS SNAPSHOT
[facility]  [address]  [place] [address]  Phone: [phone]  Fax: [phone]                  Tess Duncan   3/31/2017 8:40 AM   Office Visit    Description:  Female : 1955   Provider:  Julia Crawley MD   Department:  [facility]           Reason for Visit     Numbness           Diagnoses this Visit        Comments    Lower extremity numbness    -  Primary     Scoliosis, unspecified scoliosis type, unspecified spinal region         Osteoporosis, idiopathic                To Do List           Future Appointments        Provider Department Dept Phone    3/31/2017 9:30 AM LAB, SAME DAY [facility] [phone]    2017 4:30 PM [facility] MRI1 [address] [facility] [phone]      Goals (5 Years of Data)     None      Follow-Up and Disposition     Return if symptoms worsen or fail to improve.      [facility]     [facility] Nurse Care Line -  Assistance  Unless otherwise directed by your provider, please contact [facility], our nurse care line that is available for  assistance.     Registered nurses in the [facility] provide: appointment scheduling, clinical advisement, health education, and other advisory services.  Call: [phone] (toll free)               Medications           Message regarding Medications     Verify the changes and/or additions to your medication regime listed below are the same as discussed with your clinician today.  If any of these changes or additions are incorrect, please notify your healthcare provider.             Verify that the below list of medications is an accurate representation of the medications you are currently taking.  If none reported, the list may be blank. If incorrect, please contact your healthcare provider. Carry this list with you in case of emergency.           Current Medications     calcium carbonate (CORAL  CALCIUM) 390 mg  (1,000 mg) Tab Take by mouth.    fludrocortisone (FLORINEF) 0.1 mg Tab Take 1 tablet (100 mcg total) by mouth once daily.    levothyroxine (SYNTHROID) 88 MCG tablet Take 1 tablet (88 mcg total) by mouth once daily.    predniSONE (DELTASONE) 5 MG tablet TAKE 1 TABLET BY MOUTH EVERY MORNING AND 1/2 TABLET EVERY EVENING    vitamin D 1000 units Tab Take 185 mg by mouth once daily.    alendronate (FOSAMAX) 70 MG tablet 1 tablet on Sunday with a full glass of water and wait 30 minutes for breakfast and pills. Take sitting or standing    L.acidophilus-Bif. animalis (PROBIOTIC) 5 billion cell CpSP Take by mouth once daily.    multivitamin capsule Take 1 capsule by mouth once daily.           Clinical Reference Information           Your Vitals Were     BP Pulse Weight SpO2 BMI    108/78 88 64.9 kg (143 lb 1.3 oz) 99% 27.94 kg/m2      Blood Pressure          Most Recent Value    BP  108/78      Allergies as of 3/31/2017     Iodinated Contrast Media - Iv Dye    Aleve  [Naproxen Sodium]    Codeine    Pcn [Penicillins]    Phenytoin Sodium Extended      Immunizations Administered on Date of Encounter - 3/31/2017     None      Orders Placed During Today's Visit     Future Labs/Procedures Expected by Expires    MRI Lumbar Spine Without Contrast  3/31/2017 3/31/2018    Vitamin B12  3/31/2017 6/29/2017      Language Assistance Services     ATTENTION: Language assistance services are available, free of charge. Please call [phone].      ATENCIÓN: Si habla español, tiene a harley disposición servicios gratuitos de asistencia lingüística. Llame al [phone].     [facility] Ý: N?u b?n nói Ti?ng Vi?t, có các d?ch v? h? tr? ngôn ng? mi?n phí dành cho b?n. G?i s? [phone].         [facility] complies with applicable Federal civil rights laws and does not discriminate on the basis of race, color, national origin, age, disability, or sex.

## 2017-04-01 NOTE — TELEPHONE ENCOUNTER
Message sent to pt via my chart with lab results and updates to plan.    Please schedule labs in 1-2 weeks

## 2017-04-03 NOTE — TELEPHONE ENCOUNTER
----- Message from Nazanin Hill sent at 4/3/2017  2:27 PM CDT -----  _  1st Request  _  2nd Request  _  3rd Request        Who: patient    Why: pt is returning your call     What Number to Call Back: [phone]    When to Expect a call back: (Before the end of the day)   -- if the call is after 12:00, the call back will be tomorrow.

## 2017-04-05 ENCOUNTER — HOSPITAL ENCOUNTER (OUTPATIENT)
Dept: RADIOLOGY | Facility: OTHER | Age: 62
Discharge: HOME | End: 2017-04-05
Attending: INTERNAL MEDICINE
Payer: COMMERCIAL

## 2017-04-05 DIAGNOSIS — R20.0 LOWER EXTREMITY NUMBNESS: ICD-10-CM

## 2017-04-05 PROCEDURE — 72148 MRI LUMBAR SPINE W/O DYE: CPT | Mod: 26,,, | Performed by: RADIOLOGY

## 2017-04-05 PROCEDURE — 72148 MRI LUMBAR SPINE W/O DYE: CPT | Mod: TC

## 2017-04-06 ENCOUNTER — TELEPHONE (OUTPATIENT)
Dept: INTERNAL MEDICINE | Facility: CLINIC | Age: 62
End: 2017-04-06

## 2017-04-06 DIAGNOSIS — M47.26 OSTEOARTHRITIS OF SPINE WITH RADICULOPATHY, LUMBAR REGION: Primary | ICD-10-CM

## 2017-04-06 DIAGNOSIS — R20.0 LOWER EXTREMITY NUMBNESS: ICD-10-CM

## 2017-04-06 NOTE — TELEPHONE ENCOUNTER
Pt advised of PCP's advice/ back and spine appt scheduled/ appt reminder letter mailed to pt     Verbalized understanding/no further questions or concerns at this time

## 2017-04-06 NOTE — TELEPHONE ENCOUNTER
----- Message from Chandrika Dykes sent at 4/6/2017 11:08 AM CDT -----  Contact: GAB ESPARZA [[id number]]  x_  1st Request  _  2nd Request  _  3rd Request        Who: GAB ESPARZA [[id number]]    Why: Patient is returning clinical staff phone call. Thanks!    What Number to Call Back: [phone]    When to Expect a call back: (Before the end of the day)   -- if the call is after 12:00, the call back will be tomorrow.

## 2017-04-06 NOTE — TELEPHONE ENCOUNTER
Message sent to pt via my chart with lab results and updates to plan.   Please schedule back and spine clinic appt to further eval pt symptoms and MRI results.

## 2017-04-10 ENCOUNTER — TELEPHONE (OUTPATIENT)
Dept: ENDOSCOPY | Facility: HOSPITAL | Age: 62
End: 2017-04-10

## 2017-04-14 ENCOUNTER — LAB VISIT (OUTPATIENT)
Dept: LAB | Facility: OTHER | Age: 62
End: 2017-04-14
Attending: INTERNAL MEDICINE
Payer: COMMERCIAL

## 2017-04-14 DIAGNOSIS — R79.89 LOW VITAMIN B12 LEVEL: ICD-10-CM

## 2017-04-14 PROCEDURE — 36415 COLL VENOUS BLD VENIPUNCTURE: CPT

## 2017-04-14 PROCEDURE — 83090 ASSAY OF HOMOCYSTEINE: CPT

## 2017-04-14 PROCEDURE — 83921 ORGANIC ACID SINGLE QUANT: CPT

## 2017-04-17 ENCOUNTER — OFFICE VISIT (OUTPATIENT)
Dept: SPINE | Facility: CLINIC | Age: 62
End: 2017-04-17
Attending: INTERNAL MEDICINE
Payer: COMMERCIAL

## 2017-04-17 VITALS
WEIGHT: 143 LBS | DIASTOLIC BLOOD PRESSURE: 69 MMHG | SYSTOLIC BLOOD PRESSURE: 132 MMHG | BODY MASS INDEX: 28.07 KG/M2 | HEART RATE: 89 BPM | HEIGHT: 60 IN

## 2017-04-17 DIAGNOSIS — M51.37 DDD (DEGENERATIVE DISC DISEASE), LUMBOSACRAL: ICD-10-CM

## 2017-04-17 DIAGNOSIS — M47.819 SPONDYLOSIS WITHOUT MYELOPATHY: ICD-10-CM

## 2017-04-17 DIAGNOSIS — R20.0 BILATERAL LEG NUMBNESS: Primary | ICD-10-CM

## 2017-04-17 DIAGNOSIS — M54.50 BILATERAL LOW BACK PAIN WITHOUT SCIATICA, UNSPECIFIED CHRONICITY: ICD-10-CM

## 2017-04-17 LAB — HCYS SERPL-SCNC: 11.3 UMOL/L

## 2017-04-17 PROCEDURE — 99203 OFFICE O/P NEW LOW 30 MIN: CPT | Mod: S$GLB,,, | Performed by: PHYSICIAN ASSISTANT

## 2017-04-17 PROCEDURE — 1160F RVW MEDS BY RX/DR IN RCRD: CPT | Mod: S$GLB,,, | Performed by: PHYSICIAN ASSISTANT

## 2017-04-17 PROCEDURE — 99999 PR PBB SHADOW E&M-EST. PATIENT-LVL III: CPT | Mod: PBBFAC,,, | Performed by: PHYSICIAN ASSISTANT

## 2017-04-17 NOTE — LETTER
April 17, 2017      Julia Crawley MD  [address]  [facility] [address]           [facility]  [address], [address]  [facility] [address]  Phone: [phone]  Fax: [phone]          Patient: Tess Duncan   MR Number: [id number]   YOB: 1955   Date of Visit: 4/17/2017       Dear Dr. Julia Crawley:    Thank you for referring Tess Duncan to me for evaluation. Attached you will find relevant portions of my assessment and plan of care.    If you have questions, please do not hesitate to call me. I look forward to following Tess Duncan along with you.    Sincerely,    ANA Thrasherosure  CC:  No Recipients    If you would like to receive this communication electronically, please contact [email or web address] or [phone] to request more information on [address] Link access.    For providers and/or their staff who would like to refer a patient to [facility], please contact us through our one-stop-shop provider referral line, [facility], at [phone].    If you feel you have received this communication in error or would no longer like to receive these types of communications, please e-mail [email or web address]

## 2017-04-17 NOTE — PROGRESS NOTES
"Subjective:      Patient ID: Tess Duncan is a 62 y.o. female.    Chief Complaint: Leg Pain      HPI     She presents today complaining of few months history of intermittent numbness in her legs that started at night. She describes it as a "pre-numbing" feeling that starts in her feet and can move up to her thighs. She never has significant pins and needles but has some numbing feeling. Symptoms are worse at night but do occur during the day. Changing positions either makes symptoms better or worse. Symptoms generally improve over minutes, but do wake her from sleep. Only minimal intermittent LBP. No pain in her legs. She rates her pain as a 3 on a scale of 1-10- this is LBP due to gardening yesterday. No weakness in her legs. She has leg length discrepancy and does fall sometimes due to this.     No treatment. No PT, injections, or surgery on her back. She was diagnosed with Cushing disease in 1972 and subsequently underwent bilateral adrenalectomy and pituitary radiation. She was placed on Florinef and prednisone, which she has taken for all of these years. History of thyroidectomy. She regularly sees endocrine. Recently saw Dr. 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 She has osteoporosis. She is on fosamax, prednisone, and florinef.     Patient denies fevers, chills, nausea, vomiting, and weight loss. Patient also denies bowel/bladder dysfunction, sexual dysfunction, and any saddle anesthesia. She admits to night sweats.       Review of Systems   Constitution: Positive for weakness, malaise/fatigue, night sweats and weight gain. Negative for fever and weight loss.   HENT: Positive for tinnitus. Negative for headaches, hearing loss, nosebleeds and odynophagia.    Eyes: Negative for blurred vision and double vision.        Positive for poor vision.    Cardiovascular: Negative for chest pain, irregular heartbeat and palpitations.   Respiratory: " Negative for cough, hemoptysis, shortness of breath and wheezing.    Endocrine: Negative for cold intolerance and polydipsia.   Hematologic/Lymphatic: Does not bruise/bleed easily.   Skin: Negative for dry skin, poor wound healing, rash and suspicious lesions.   Musculoskeletal:        See HPI for pertinent positives.   Gastrointestinal: Negative for bloating, abdominal pain, constipation, diarrhea, hematochezia, melena, nausea and vomiting.   Genitourinary: Negative for bladder incontinence, dysuria, hematuria, hesitancy and incomplete emptying.   Neurological: Positive for numbness and paresthesias. Negative for disturbances in coordination, dizziness, focal weakness, loss of balance and seizures.        Positive for loss of muscle mass, abnormal arm/leg sensations.   Psychiatric/Behavioral: Positive for depression. Negative for hallucinations. The patient is not nervous/anxious.            Objective:        General: Tess is well-developed, well-nourished, appears stated age, in no acute distress, alert and oriented to time, place and person.     General    Vitals reviewed.  Constitutional: She is oriented to person, place, and time. She appears well-developed and well-nourished.   Pulmonary/Chest: Effort normal.   Abdominal: She exhibits no distension.   Neurological: She is alert and oriented to person, place, and time.   Psychiatric: She has a normal mood and affect. Her behavior is normal. Judgment and thought content normal.           Gait: normal, tandem walking is normal and she is able to heel/toe stand.     On exam of the lumbar spine, Inspection of back is normal, No tenderness noted    Skin in lumbar region is warm to the touch without visible rashes.     muscle tone normal without spasm, full range of motion without pain  Patient denies pain with lumbar ROM.    Strength testing of the bilateral LEs shows  Right hip abduction:  +5/5  Left hip abduction:  +5/5  Right hip flexion:  +5/5   Left hip  flexion:  +5/5  Right hip extensors:  +5/5  Left hip extensors:  +5/5  Right quadriceps:  +5/5  Left quadriceps:  +5/5  Right hamstring:  +5/5  Left hamstring:  +5/5  Right dorsiflexion:  +5/5  Left dorsiflexion:  +5/5  Right plantar flexion:  +5/5  Left plantar flexion:  +5/5   Right EHL:  +5/5   Left EHL:  +5/5    negative clonus of bilateral LEs. Negative hoffmans bilateral UEs.     negative straight leg raise on bilateral LEs.     DTRs:  Right patellar:  2+     Left patellar:  2+  Right achilles:  2+   Left achilles:  2+  Right biceps:  2+     Left biceps:  2+    Right brachioradialis:  2+  Left brachioradialis:  2+    Sensation is grossly intact in L2, L3, L4, L5, and S1 distribution.    Right hip has no pain with IR/ER. Left hip has no pain with IR/ER.      On exam of bilateral UEs, patient has full painfree ROM with no signs of clubbing, laxity, cyanosis, edema, instability, weakness, or tenderness.       XRAY INTERPRETATION:  MRI of lumbar spine dated 4/5/17 is personally reviewed and shows diffuse lumbar spondylosis with facet hypertrophy. No significant central or foraminal stenosis. Possible left renal cyst per radiology.         Assessment:       1. Bilateral leg numbness    2. Spondylosis without myelopathy    3. DDD (degenerative disc disease), lumbosacral    4. Bilateral low back pain without sciatica, unspecified chronicity           Plan:            Few month history of intermittent numbness in her feet that can move up to her upper thighs. Minimal intermittent LBP. No true leg pain. Known diffuse lumbar spondylosis with facet hypertrophy. No significant central or foraminal stenosis. Symptoms do not appear radicular in nature. Treatment options reviewed with patient along with above lumbar MRI scan. Following plan made:     - Offered trial of neurontin 300mg to take q hs. She wants to hold off on medications as symptoms are tolerable.   - Discussed EMG/NCS of bilateral LEs. She is not interested.    - She is comfortable monitoring her symptoms and calling if she gets worse. She will f/u prn at her request.   - Of note, discussed possible left renal cyst seen on MRI per radiology. She states this is not a new finding.   - She is hyper reflexic in bilateral UE/LE. No clonus or hoffmans.     Follow-up: Return if symptoms worsen or fail to improve. If there are any questions prior to this, the patient was instructed to contact the office.

## 2017-04-20 LAB — METHYLMALONATE SERPL-SCNC: 0.13 UMOL/L

## 2017-08-10 ENCOUNTER — OFFICE VISIT (OUTPATIENT)
Dept: URGENT CARE | Facility: CLINIC | Age: 62
End: 2017-08-10
Payer: COMMERCIAL

## 2017-08-10 VITALS
SYSTOLIC BLOOD PRESSURE: 138 MMHG | RESPIRATION RATE: 18 BRPM | TEMPERATURE: 99 F | HEIGHT: 60 IN | WEIGHT: 138 LBS | BODY MASS INDEX: 27.09 KG/M2 | HEART RATE: 92 BPM | DIASTOLIC BLOOD PRESSURE: 84 MMHG | OXYGEN SATURATION: 98 %

## 2017-08-10 DIAGNOSIS — N39.0 URINARY TRACT INFECTION WITH HEMATURIA, SITE UNSPECIFIED: Primary | ICD-10-CM

## 2017-08-10 DIAGNOSIS — R31.9 URINARY TRACT INFECTION WITH HEMATURIA, SITE UNSPECIFIED: Primary | ICD-10-CM

## 2017-08-10 LAB
BILIRUB UR QL STRIP: POSITIVE
GLUCOSE UR QL STRIP: POSITIVE
KETONES UR QL STRIP: NEGATIVE
LEUKOCYTE ESTERASE UR QL STRIP: NEGATIVE
PH, POC UA: 7.5 (ref 5–8)
POC BLOOD, URINE: POSITIVE
POC NITRATES, URINE: POSITIVE
PROT UR QL STRIP: POSITIVE
SP GR UR STRIP: 1.01 (ref 1–1.03)
UROBILINOGEN UR STRIP-ACNC: POSITIVE (ref 0.1–1.1)

## 2017-08-10 PROCEDURE — 81003 URINALYSIS AUTO W/O SCOPE: CPT | Mod: QW,S$GLB,, | Performed by: NURSE PRACTITIONER

## 2017-08-10 PROCEDURE — 99214 OFFICE O/P EST MOD 30 MIN: CPT | Mod: 25,S$GLB,, | Performed by: NURSE PRACTITIONER

## 2017-08-10 PROCEDURE — 3008F BODY MASS INDEX DOCD: CPT | Mod: S$GLB,,, | Performed by: NURSE PRACTITIONER

## 2017-08-10 RX ORDER — NITROFURANTOIN 25; 75 MG/1; MG/1
100 CAPSULE ORAL 2 TIMES DAILY
Qty: 14 CAPSULE | Refills: 0 | Status: SHIPPED | OUTPATIENT
Start: 2017-08-10 | End: 2017-08-17

## 2017-08-10 NOTE — PATIENT INSTRUCTIONS
Urinary Tract Infections in Women    Urinary tract infections (UTIs) are most often caused by bacteria (germs). These bacteria enter the urinary tract. The bacteria may come from outside the body. Or they may travel from the skin outside the rectum or vagina into the urethra. Female anatomy makes it easier for bacteria from the bowel to enter a womans urinary tract, which is the most common source of UTI. This means women develop UTIs more often than men. Pain in or around the urinary tract is a common UTI symptom. But the only way to know for sure if you have a UTI for the health care provider to test your urine. The two tests that may be done are the urinalysis and urine culture.  Types of UTIs  · Cystitis: A bladder infection (cystitis) is the most common UTI in women. You may have urgent or frequent urination. You may also have pain, burning when you urinate, and bloody urine.  · Urethritis: This is an inflamed urethra, which is the tube that carries urine from the bladder to outside the body. You may have lower stomach or back pain. You may also have urgent or frequent urination.  · Pyelonephritis: This is a kidney infection. If not treated, it can be serious and damage your kidneys. In severe cases, you may be hospitalized. You may have a fever and lower back pain.  Medications to treat a UTI  Most UTIs are treated with antibiotics. These kill the bacteria. The length of time you need to take them depends on the type of infection. It may be as short as 3 days. If you have repeated UTIs, a low-dose antibiotic may be needed for several months. Take antibiotics exactly as directed. Dont stop taking them until all of the medication is gone. If you stop taking the antibiotic too soon, the infection may not go away, and you may develop a resistance to the antibiotic. This can make it much harder to treat.  Lifestyle changes to treat and prevent UTIs  The lifestyle changes below will help get rid of your UTI.  They may also help prevent future UTIs.  · Drink plenty of fluids. This includes water, juice, or other caffeine-free drinks. Fluids help flush bacteria out of your body.  · Empty your bladder. Always empty your bladder when you feel the urge to urinate. And always urinate before going to sleep. Urine that stays in your bladder can lead to infection. Try to urinate before and after sex as well.  · Practice good personal hygiene. Wipe yourself from front to back after using the toilet. This helps keep bacteria from getting into the urethra.  · Use condoms during sex. These help prevent UTIs caused by sexually transmitted bacteria. Also, avoid using spermicides during sex. These can increase the risk of UTIs. Choose other forms of birth control instead. For women who tend to get UTIs after sex, a low-dose of a preventive antibiotic may be used. Be sure to discuss this option with your health care provider.  · Follow up with your health care provider as directed. He or she may test to make sure the infection has cleared. If necessary, additional treatment may be started.  Date Last Reviewed: 9/8/2014  © 3810-7698 [employer]. [address] [place] [address]. All rights reserved. This information is not intended as a substitute for professional medical care. Always follow your healthcare professional's instructions.      Please return here or go to the Emergency Department for any concerns or worsening of condition.  If you were prescribed antibiotics, please take them to completion.  If you were prescribed a narcotic medication, do not drive or operate heavy equipment or machinery while taking these medications.  Please follow up with your primary care doctor or specialist as needed.  Please drink plenty of fluids.  Please get plenty of rest.  If you were prescribed Pyridium (phenazopyridine), please be aware that if you wear contact lens that this medication may stain your contacts.  While  taking this medication it is recommended that you do not wear your contacts until 24 hours after your last dose.  Please follow up with your primary care doctor or specialist as needed.    If you  smoke, please stop smoking.    See patient education handouts.

## 2017-08-10 NOTE — PROGRESS NOTES
Subjective:       Patient ID: Tess Duncan is a 62 y.o. female.    Chief Complaint: Urinary Tract Infection    Pt reports that she bagan experiencing blood in the urine around 3 am. Pt reports that she took azo around 5am to help relieve some of the symptoms. Pain is rated as a 4/10 when voiding      Urinary Tract Infection    This is a new problem. The current episode started today. The problem has been unchanged. The pain is at a severity of 2/10. Associated symptoms include hematuria and urgency. Pertinent negatives include no chills, nausea or vomiting.     Review of Systems   Constitution: Negative for chills and fever.   Musculoskeletal: Negative for back pain.   Gastrointestinal: Negative for abdominal pain, nausea and vomiting.   Genitourinary: Positive for dysuria, hematuria and urgency. Negative for genital sores, missed menses and non-menstrual bleeding.       Objective:      Physical Exam   Constitutional: She is oriented to person, place, and time. She appears well-developed and well-nourished.   HENT:   Head: Normocephalic and atraumatic.   Right Ear: External ear normal.   Left Ear: External ear normal.   Nose: Nose normal. No nasal deformity. No epistaxis.   Mouth/Throat: Oropharynx is clear and moist and mucous membranes are normal.   Eyes: Conjunctivae and lids are normal.   Neck: Trachea normal, normal range of motion and phonation normal. Neck supple.   Cardiovascular: Normal rate, regular rhythm, normal heart sounds and normal pulses.    Pulmonary/Chest: Effort normal and breath sounds normal.   Abdominal: Soft. Normal appearance and bowel sounds are normal. She exhibits no distension and no mass. There is tenderness in the suprapubic area. There is no CVA tenderness.   Neurological: She is alert and oriented to person, place, and time.   Skin: Skin is warm, dry and intact.   Psychiatric: She has a normal mood and affect. Her speech is normal and behavior is normal. Cognition and memory are  normal.   Nursing note and vitals reviewed.      Office Visit on 08/10/2017   Component Date Value Ref Range Status    POC Blood, Urine 08/10/2017 Positive* Negative Final    POC Bilirubin, Urine 08/10/2017 Positive* Negative Final    POC Urobilinogen, Urine 08/10/2017 positive  0.1 - 1.1 Final    POC Ketones, Urine 08/10/2017 Negative  Negative Final    POC Protein, Urine 08/10/2017 Positive* Negative Final    POC Nitrates, Urine 08/10/2017 Positive* Negative Final    POC Glucose, Urine 08/10/2017 Positive* Negative Final    pH, UA 08/10/2017 7.5  5 - 8 Final    POC Specific Gravity, Urine 08/10/2017 1.010  1.003 - 1.029 Final    POC Leukocytes, Urine 08/10/2017 Negative  Negative Final     Assessment:       1. Urinary tract infection with hematuria, site unspecified        Plan:       Tess was seen today for urinary tract infection.    Diagnoses and all orders for this visit:    Urinary tract infection with hematuria, site unspecified  -     POCT Urinalysis, Dipstick, Automated, W/O Scope  -     nitrofurantoin, macrocrystal-monohydrate, (MACROBID) 100 MG capsule; Take 1 capsule (100 mg total) by mouth 2 (two) times daily.    See patient education handouts.    Please return here or go to the Emergency Department for any concerns or worsening of condition.  If you were prescribed antibiotics, please take them to completion.  If you were prescribed a narcotic medication, do not drive or operate heavy equipment or machinery while taking these medications.  Please follow up with your primary care doctor or specialist as needed.  Please drink plenty of fluids.  Please get plenty of rest.  If you were prescribed Pyridium (phenazopyridine), please be aware that if you wear contact lens that this medication may stain your contacts.  While taking this medication it is recommended that you do not wear your contacts until 24 hours after your last dose.  Please follow up with your primary care doctor or specialist  as needed.    If you  smoke, please stop smoking.

## 2017-09-26 ENCOUNTER — PATIENT MESSAGE (OUTPATIENT)
Dept: INTERNAL MEDICINE | Facility: CLINIC | Age: 62
End: 2017-09-26

## 2017-09-26 DIAGNOSIS — N30.00 ACUTE CYSTITIS WITHOUT HEMATURIA: Primary | ICD-10-CM

## 2017-09-26 DIAGNOSIS — N39.0 RECURRENT UTI: ICD-10-CM

## 2017-09-26 NOTE — TELEPHONE ENCOUNTER
Reviewed pt's message    Please schedule UA and Ucx for Monday Oct 2 and rec she f/u with urology since has had 3+ UTI's over past year. Please schedule with Dr. Gee

## 2017-09-27 NOTE — TELEPHONE ENCOUNTER
Called and spoke w/ pt and scheduled all appts that were needed .  Pt verbally agree to appt time and date and has no further question or concerns.

## 2017-10-02 ENCOUNTER — LAB VISIT (OUTPATIENT)
Dept: LAB | Facility: HOSPITAL | Age: 62
End: 2017-10-02
Attending: INTERNAL MEDICINE
Payer: COMMERCIAL

## 2017-10-02 DIAGNOSIS — N30.00 ACUTE CYSTITIS WITHOUT HEMATURIA: ICD-10-CM

## 2017-10-02 LAB
BILIRUB UR QL STRIP: NEGATIVE
CLARITY UR REFRACT.AUTO: CLEAR
COLOR UR AUTO: NORMAL
GLUCOSE UR QL STRIP: NEGATIVE
HGB UR QL STRIP: NEGATIVE
KETONES UR QL STRIP: NEGATIVE
LEUKOCYTE ESTERASE UR QL STRIP: NEGATIVE
NITRITE UR QL STRIP: NEGATIVE
PH UR STRIP: 7 [PH] (ref 5–8)
PROT UR QL STRIP: NEGATIVE
SP GR UR STRIP: 1 (ref 1–1.03)
URN SPEC COLLECT METH UR: NORMAL
UROBILINOGEN UR STRIP-ACNC: NEGATIVE EU/DL

## 2017-10-02 PROCEDURE — 81003 URINALYSIS AUTO W/O SCOPE: CPT

## 2017-10-02 PROCEDURE — 87086 URINE CULTURE/COLONY COUNT: CPT

## 2017-10-03 LAB — BACTERIA UR CULT: NO GROWTH

## 2017-10-23 ENCOUNTER — TELEPHONE (OUTPATIENT)
Dept: NEUROSURGERY | Facility: CLINIC | Age: 62
End: 2017-10-23

## 2017-10-23 NOTE — TELEPHONE ENCOUNTER
----- Message from Tonja Holcomb sent at 10/23/2017 10:10 AM CDT -----  Contact: self @ [phone]  Pt is scheduled to f/u with Dr Alan on 11-20-17.  Pt is calling to have her MRI scheduled in the evening / night time.  pls call.

## 2017-10-23 NOTE — TELEPHONE ENCOUNTER
SW PT, SCHED MRI/MRA NO CONTRAST ALLERGY TO MRI DYE. NO IODINE INVOLVED. APPT SLIPS ARE IN THE MAIL.

## 2017-10-25 ENCOUNTER — OFFICE VISIT (OUTPATIENT)
Dept: UROLOGY | Facility: CLINIC | Age: 62
End: 2017-10-25
Attending: INTERNAL MEDICINE
Payer: COMMERCIAL

## 2017-10-25 VITALS
SYSTOLIC BLOOD PRESSURE: 111 MMHG | HEART RATE: 89 BPM | BODY MASS INDEX: 27.46 KG/M2 | DIASTOLIC BLOOD PRESSURE: 76 MMHG | HEIGHT: 60 IN | WEIGHT: 139.88 LBS

## 2017-10-25 DIAGNOSIS — N39.0 RECURRENT UTI: Primary | ICD-10-CM

## 2017-10-25 DIAGNOSIS — R31.0 HEMATURIA, GROSS: ICD-10-CM

## 2017-10-25 LAB
BILIRUB SERPL-MCNC: NORMAL MG/DL
BLOOD URINE, POC: NORMAL
COLOR, POC UA: NORMAL
GLUCOSE UR QL STRIP: NORMAL
KETONES UR QL STRIP: NORMAL
LEUKOCYTE ESTERASE URINE, POC: NORMAL
NITRITE, POC UA: NORMAL
PH, POC UA: 5
POC RESIDUAL URINE VOLUME: 12 ML (ref 0–100)
PROTEIN, POC: NORMAL
SPECIFIC GRAVITY, POC UA: 1
UROBILINOGEN, POC UA: NORMAL

## 2017-10-25 PROCEDURE — 81002 URINALYSIS NONAUTO W/O SCOPE: CPT | Mod: S$GLB,,, | Performed by: UROLOGY

## 2017-10-25 PROCEDURE — 51798 US URINE CAPACITY MEASURE: CPT | Mod: S$GLB,,, | Performed by: UROLOGY

## 2017-10-25 PROCEDURE — 99244 OFF/OP CNSLTJ NEW/EST MOD 40: CPT | Mod: 25,S$GLB,, | Performed by: UROLOGY

## 2017-10-25 RX ORDER — CRANBERRY FRUIT EXTRACT 650 MG
10 CAPSULE ORAL
COMMUNITY
End: 2024-01-11

## 2017-10-25 NOTE — PROGRESS NOTES
"  Subjective:       Tess Duncan is a 62 y.o. female who is a new patient who was referred by Dr Crawley for evaluation of recurrent UTIs.      She was referred by PCP for recurrent UTIs. She is reported to have 5 UTIs in the last year. She says this is "related to stress and caffeine." She thinks this is related to not drinking enough water. She was last seen in 8/17 by Waqas NP for s/s of UTI - dysuria, urgency, hematuria. She was given Macrobid for this. Unfortunately, no UCx was sent at that time.     She noted gross hematuria significantly in the past. Hematuria with clots lasted for several hours. She notes hesitancy in AM occasionally. Denies UI. Denies RAHUL. She has been seeing Urgent Care for UTI so there is very limited data available for review. She reports getting Macrobid each time for varying lengths of time.     Denies h/o nephrolithiasis, constipation. Nocturia x 1-2.    UCx:  10/17 - negative  5/16 - 10-49k E coli    PVR (bladder scan) today - 17cc      The following portions of the patient's history were reviewed and updated as appropriate: allergies, current medications, past family history, past medical history, past social history, past surgical history and problem list.    Review of Systems  Constitutional: no fever or chills  ENT: no nasal congestion or sore throat  Respiratory: no cough or shortness of breath  Cardiovascular: no chest pain or palpitations  Gastrointestinal: no nausea or vomiting, tolerating diet  Genitourinary: as per HPI  Hematologic/Lymphatic: no easy bruising or lymphadenopathy  Musculoskeletal: no arthralgias or myalgias  Skin: no rashes or lesions  Neurological: no seizures or tremors  Behavioral/Psych: no auditory or visual hallucinations        Objective:    Vitals: /76 (BP Location: Left arm, Patient Position: Sitting, BP Method: Medium (Automatic))   Pulse 89   Ht 5' (1.524 m)   Wt 63.5 kg (139 lb 14.1 oz)   BMI 27.32 kg/m²     Physical Exam   General: " well developed, well nourished in no acute distress  Head: normocephalic, atraumatic  Neck: supple, trachea midline, no obvious enlargement of thyroid  HEENT: EOMI, mucus membranes moist, sclera anicteric, no hearing impairment  Lungs: symmetric expansion, non-labored breathing  Cardiovascular: regular rate and rhythm, normal pulses  Abdomen: soft, non tender, non distended, no palpable masses, no hepatosplenomegaly, no hernias, no CVA tenderness  Musculoskeletal: no peripheral edema, normal ROM in bilateral upper and lower extremities  Lymphatics: no cervical or inguinal lymphadenopathy  Skin: no rashes or lesions  Neuro: alert and oriented x 3, no gross deficits  Psych: normal judgment and insight, normal mood/affect and non-anxious  Genitourinary:   patient declined exam      Lab Review   Urine analysis today in clinic shows negative for all components     Lab Results   Component Value Date    WBC 6.43 02/29/2016    HGB 14.3 02/29/2016    HCT 43.7 02/29/2016    MCV 90 02/29/2016     02/29/2016     Lab Results   Component Value Date    CREATININE 0.9 02/14/2017    BUN 21 (H) 02/14/2017       Imaging  NA       Assessment/Plan:      1. Recurrent UTI    - Discussed UTI prevention strategies.   - Adequate hydration.   - Double voiding. Consider timed voiding.    - Avoid constipation.   - Will assess upper tracts with MRI   - Cystoscopy   - Cranberry/probiotics    - Estrace cream 2x weekly. Will need to check with PCP regarding contraindication 2/2 liver hemangioma.    - PVR acceptable   - Call with UTI symptoms so UA/UCx can be sent.      2. Gross hematuria   - Discussed etiology and workup of hematuria   - Likely related to UTI but due to severity would recommend workup   - UCx - UA clear, not indicated today   - Cytology - not indicated   - CT urogram - unable to do 2/2 iodine contrast allergy. Recommend MRI urogram.    - Office cystoscopy      Follow up in 3-4 weeks for cystoscopy

## 2017-10-27 ENCOUNTER — PATIENT MESSAGE (OUTPATIENT)
Dept: INTERNAL MEDICINE | Facility: CLINIC | Age: 62
End: 2017-10-27

## 2017-10-27 DIAGNOSIS — E03.9 HYPOTHYROIDISM, UNSPECIFIED TYPE: Primary | ICD-10-CM

## 2017-10-30 NOTE — TELEPHONE ENCOUNTER
Message sent to pt via my chart with lab results and updates to plan.     Please schedule thyroid labs in 1-2 weeks

## 2017-10-30 NOTE — TELEPHONE ENCOUNTER
Called pt and left vm stating that message has been sent to her my chart w/ lab results an updates to plan .   Left office number for pt to return office call to schedule an lab appt for her thyroids in 1-2 weeks .

## 2017-10-31 ENCOUNTER — HOSPITAL ENCOUNTER (OUTPATIENT)
Dept: RADIOLOGY | Facility: HOSPITAL | Age: 62
Discharge: HOME | End: 2017-10-31
Attending: UROLOGY
Payer: COMMERCIAL

## 2017-10-31 DIAGNOSIS — R31.0 HEMATURIA, GROSS: ICD-10-CM

## 2017-10-31 DIAGNOSIS — N39.0 RECURRENT UTI: ICD-10-CM

## 2017-10-31 PROCEDURE — 74181 MRI ABDOMEN W/O CONTRAST: CPT | Mod: TC

## 2017-10-31 PROCEDURE — 74181 MRI ABDOMEN W/O CONTRAST: CPT | Mod: 26,,, | Performed by: RADIOLOGY

## 2017-11-06 ENCOUNTER — LAB VISIT (OUTPATIENT)
Dept: LAB | Facility: OTHER | Age: 62
End: 2017-11-06
Attending: INTERNAL MEDICINE
Payer: COMMERCIAL

## 2017-11-06 DIAGNOSIS — E03.9 HYPOTHYROIDISM, UNSPECIFIED TYPE: ICD-10-CM

## 2017-11-06 LAB
T4 FREE SERPL-MCNC: 1.51 NG/DL
TSH SERPL DL<=0.005 MIU/L-ACNC: 1.1 UIU/ML

## 2017-11-06 PROCEDURE — 36415 COLL VENOUS BLD VENIPUNCTURE: CPT

## 2017-11-06 PROCEDURE — 84439 ASSAY OF FREE THYROXINE: CPT

## 2017-11-06 PROCEDURE — 84443 ASSAY THYROID STIM HORMONE: CPT

## 2017-11-08 ENCOUNTER — PATIENT OUTREACH (OUTPATIENT)
Dept: INTERNAL MEDICINE | Facility: CLINIC | Age: 62
End: 2017-11-08

## 2017-11-08 NOTE — PROGRESS NOTES
[facility] is committed to your overall health.  To help you get the most out of each of your visits, we will review your information to make sure you are up to date on all of your recommended tests and/or procedures.       Your PCP  Julia Crawley MD   found that you may be due for:       Health Maintenance Due   Topic Date Due    Zoster Vaccine  01/14/2017    Influenza Vaccine  08/01/2017             If you have had any of the above done at another facility, please bring the records or information with you so that your record at [facility] will be complete.  If you would like to schedule any of these, please contact me.     If you are currently taking medication, please bring it with you to your appointment for review.     Also, if you have any type of Advanced Directives, please bring them with you to your office visit so we may scan them into your chart.     Thank you for Choosing [facility] for your healthcare needs.      Additional Information  If you have questions, you can email [email or web address] or call [phone]  to talk to our [facility] staff. Remember, [facility] is NOT to be used for urgent needs. For medical emergencies, dial 911.

## 2017-11-20 ENCOUNTER — OFFICE VISIT (OUTPATIENT)
Dept: NEUROSURGERY | Facility: CLINIC | Age: 62
End: 2017-11-20
Payer: COMMERCIAL

## 2017-11-20 ENCOUNTER — HOSPITAL ENCOUNTER (OUTPATIENT)
Dept: RADIOLOGY | Facility: HOSPITAL | Age: 62
Discharge: HOME | End: 2017-11-20
Attending: NEUROLOGICAL SURGERY
Payer: COMMERCIAL

## 2017-11-20 VITALS
SYSTOLIC BLOOD PRESSURE: 119 MMHG | TEMPERATURE: 99 F | WEIGHT: 135.63 LBS | HEART RATE: 89 BPM | HEIGHT: 60 IN | BODY MASS INDEX: 26.63 KG/M2 | DIASTOLIC BLOOD PRESSURE: 74 MMHG

## 2017-11-20 DIAGNOSIS — I67.1 CEREBRAL ANEURYSM: ICD-10-CM

## 2017-11-20 DIAGNOSIS — I67.1 CEREBRAL ANEURYSM: Primary | ICD-10-CM

## 2017-11-20 PROCEDURE — 99999 PR PBB SHADOW E&M-EST. PATIENT-LVL III: CPT | Mod: PBBFAC,,, | Performed by: NEUROLOGICAL SURGERY

## 2017-11-20 PROCEDURE — 99213 OFFICE O/P EST LOW 20 MIN: CPT | Mod: S$GLB,,, | Performed by: NEUROLOGICAL SURGERY

## 2017-11-20 PROCEDURE — 70544 MR ANGIOGRAPHY HEAD W/O DYE: CPT | Mod: TC

## 2017-11-20 PROCEDURE — 70551 MRI BRAIN STEM W/O DYE: CPT | Mod: 26,,, | Performed by: RADIOLOGY

## 2017-11-20 PROCEDURE — 70551 MRI BRAIN STEM W/O DYE: CPT | Mod: TC

## 2017-11-20 NOTE — PROGRESS NOTES
This office note has been dictated.  Tess Duncan returned in neurosurgical followup to the office this afternoon.    She has generally been doing well neurologically over the last six months.  She   did have problems with repeated bladder infections, which led to an MRI of the   abdomen being done last month.  This showed no specific difficulties with the   urinary system.  She has had no severe headache.  No change in vision, no   difficulty speaking or swallowing, no weakness or numbness in extremities.    On brief examination today, she is bright and alert, speaking well.  She shows   normal extraocular movements.  She had no problem arising from a chair and   moving all extremities and walking well.    MRI of the brain was done without contrast because her most recent creatinine   value was felt to be elevated.  MRA was done also.  MRI without contrast was   compared to her MRI of 03/03/17.  Comparing the T2-weighted studies, the lesion   seems about the same, but it is difficult to say whether there is any   significant change as the acquisition is somewhat different.  MRA does not show   any flow into the lesion above the left middle cerebral artery.    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    She has allergy to iodine contrast.  I will ask her primary physician to draw a   repeat creatinine tomorrow during her visit and we can plan from there.      DRISS/COOPER  dd: 11/20/2017 17:22:07 (CST)  td: 11/21/2017 12:16:23 (CST)  Doc ID   #[id number]  Job ID #[id number]    CC: Tess Duncan

## 2017-11-21 ENCOUNTER — OFFICE VISIT (OUTPATIENT)
Dept: INTERNAL MEDICINE | Facility: CLINIC | Age: 62
End: 2017-11-21
Attending: INTERNAL MEDICINE
Payer: COMMERCIAL

## 2017-11-21 ENCOUNTER — LAB VISIT (OUTPATIENT)
Dept: LAB | Facility: OTHER | Age: 62
End: 2017-11-21
Attending: INTERNAL MEDICINE
Payer: COMMERCIAL

## 2017-11-21 VITALS
BODY MASS INDEX: 25.56 KG/M2 | SYSTOLIC BLOOD PRESSURE: 120 MMHG | HEART RATE: 86 BPM | OXYGEN SATURATION: 96 % | DIASTOLIC BLOOD PRESSURE: 68 MMHG | HEIGHT: 61 IN | WEIGHT: 135.38 LBS

## 2017-11-21 DIAGNOSIS — Z00.00 ANNUAL PHYSICAL EXAM: Primary | ICD-10-CM

## 2017-11-21 DIAGNOSIS — M81.8 OSTEOPOROSIS, IDIOPATHIC: ICD-10-CM

## 2017-11-21 DIAGNOSIS — E23.0 PANHYPOPITUITARISM: ICD-10-CM

## 2017-11-21 DIAGNOSIS — N17.9 AKI (ACUTE KIDNEY INJURY): ICD-10-CM

## 2017-11-21 DIAGNOSIS — N39.0 RECURRENT UTI: ICD-10-CM

## 2017-11-21 DIAGNOSIS — Z12.11 SCREENING FOR COLON CANCER: ICD-10-CM

## 2017-11-21 DIAGNOSIS — E78.5 HYPERLIPIDEMIA, UNSPECIFIED HYPERLIPIDEMIA TYPE: ICD-10-CM

## 2017-11-21 DIAGNOSIS — E89.0 HYPOTHYROIDISM, POSTSURGICAL: ICD-10-CM

## 2017-11-21 DIAGNOSIS — Z12.31 ENCOUNTER FOR SCREENING MAMMOGRAM FOR MALIGNANT NEOPLASM OF BREAST: ICD-10-CM

## 2017-11-21 DIAGNOSIS — D18.03 HEMANGIOMA OF LIVER: ICD-10-CM

## 2017-11-21 LAB
ANION GAP SERPL CALC-SCNC: 11 MMOL/L
BUN SERPL-MCNC: 19 MG/DL
CALCIUM SERPL-MCNC: 9.2 MG/DL
CHLORIDE SERPL-SCNC: 102 MMOL/L
CO2 SERPL-SCNC: 25 MMOL/L
CREAT SERPL-MCNC: 0.8 MG/DL
EST. GFR  (AFRICAN AMERICAN): >60 ML/MIN/1.73 M^2
EST. GFR  (NON AFRICAN AMERICAN): >60 ML/MIN/1.73 M^2
GLUCOSE SERPL-MCNC: 74 MG/DL
POTASSIUM SERPL-SCNC: 4.6 MMOL/L
SODIUM SERPL-SCNC: 138 MMOL/L

## 2017-11-21 PROCEDURE — 90471 IMMUNIZATION ADMIN: CPT | Mod: S$GLB,,, | Performed by: INTERNAL MEDICINE

## 2017-11-21 PROCEDURE — 87086 URINE CULTURE/COLONY COUNT: CPT

## 2017-11-21 PROCEDURE — 90686 IIV4 VACC NO PRSV 0.5 ML IM: CPT | Mod: S$GLB,,, | Performed by: INTERNAL MEDICINE

## 2017-11-21 PROCEDURE — 99396 PREV VISIT EST AGE 40-64: CPT | Mod: 25,S$GLB,, | Performed by: INTERNAL MEDICINE

## 2017-11-21 PROCEDURE — 80048 BASIC METABOLIC PNL TOTAL CA: CPT

## 2017-11-21 PROCEDURE — 36415 COLL VENOUS BLD VENIPUNCTURE: CPT

## 2017-11-21 PROCEDURE — 99999 PR PBB SHADOW E&M-EST. PATIENT-LVL IV: CPT | Mod: PBBFAC,,, | Performed by: INTERNAL MEDICINE

## 2017-11-21 NOTE — PROGRESS NOTES
"Subjective:   Patient ID: Tess Duncan is a 62 y.o. female  Chief complaint:   Chief Complaint   Patient presents with    Annual Exam       HPI    Pt here for annual exam  Pt brought labs obtained through work physical from 10/19/2017 showed cr 0.82 and gfr 78 and bun 22  Glu 63  cmp otherwise wnl    Total chol 287  tg 148  hdl 62  ldl 195  tsh 4.02 (0.4-4.5)    Inc stress at that time - since then has been exercising regularly     Followed with Dr. Gee in urology for recurrent UTIs - scheduled for cystocsopy mey  Saw NSX yesterday     Labs from yesterday showed inc in creatinine - staying hydrated, avoiding nsaids - had MRI but without contrast     Today, denies any urinary frequency, urgency, burning with urination, hematuria, foul smelling urine, cloudy urine, lower back pain, suprapubic pain or pressure.     Has appt with endocine in Feb for panhypopituitarism - stable on current meds    Review of Systems    Objective:  Vitals:    11/21/17 0858   BP: 120/68   Pulse: 86   SpO2: 96%   Weight: 61.4 kg (135 lb 5.8 oz)   Height: 5' 1" (1.549 m)     Body mass index is 25.58 kg/m².    Physical Exam   Constitutional: She is oriented to person, place, and time. She appears well-developed and well-nourished.   HENT:   Head: Normocephalic and atraumatic.   Right Ear: External ear normal.   Left Ear: External ear normal.   Nose: Nose normal.   Mouth/Throat: Oropharynx is clear and moist. No oropharyngeal exudate.   No carotid bruits   Eyes: Conjunctivae and EOM are normal.   Neck: Neck supple. No thyromegaly present.   Cardiovascular: Normal rate, regular rhythm, normal heart sounds and intact distal pulses.    Pulmonary/Chest: Effort normal and breath sounds normal.   Abdominal: Soft. Bowel sounds are normal.   Musculoskeletal: She exhibits no edema or tenderness.   Lymphadenopathy:     She has no cervical adenopathy.   Neurological: She is alert and oriented to person, place, and time.   Skin: Skin is warm and dry. "   Psychiatric: Her behavior is normal. Thought content normal.   Vitals reviewed.      Assessment:  1. Annual physical exam    2. PREM (acute kidney injury)    3. Hypothyroidism, postsurgical    4. Hyperlipidemia, unspecified hyperlipidemia type    5. Osteoporosis, idiopathic    6. Panhypopituitarism    7. Recurrent UTI    8. Hemangioma of liver    9. Encounter for screening mammogram for malignant neoplasm of breast    10. Screening for colon cancer        Plan:  Tess was seen today for annual exam.    Diagnoses and all orders for this visit:    Annual physical exam  Labs from employment reviewed  Recommend daily sunscreen, cardiovascular exercise min 30 min 5 days per week. Seatbelts routinely.      PREM (acute kidney injury): repeat bmp today and check urine labs  -     Basic metabolic panel; Future  -     Urinalysis  -     Urine culture    Hypothyroidism, postsurgical: cont med, tft's stable when recently repeate    Hyperlipidemia, unspecified hyperlipidemia type: stable, lifestyle managed  -     Lipid panel; Future    Osteoporosis, idiopathic: cont alendronate, dexa utd    Panhypopituitarism: followed by endocrine    Recurrent UTI: followed by urology    Hemangioma of liver: stable on recent imaging - MRI abdomen    Encounter for screening mammogram for malignant neoplasm of breast  -     Mammo Digital Screening Bilat with Tomosynthesis CAD; Future    Screening for colon cancer  -     Case request GI: COLONOSCOPY    Other orders  -     Influenza - Quadrivalent (3 years & older) (PF)    To get zostavax at pharmacy  Health Maintenance   Topic Date Due    Colonoscopy  07/30/2014    Zoster Vaccine  01/14/2017    Influenza Vaccine  08/01/2017    Pap Smear with HPV Cotest  02/10/2018    Mammogram  03/09/2018    Lipid Panel  10/17/2022    TETANUS VACCINE  03/05/2025    Hepatitis C Screening  Completed

## 2017-11-22 ENCOUNTER — PROCEDURE VISIT (OUTPATIENT)
Dept: UROLOGY | Facility: CLINIC | Age: 62
End: 2017-11-22
Attending: UROLOGY
Payer: COMMERCIAL

## 2017-11-22 VITALS
SYSTOLIC BLOOD PRESSURE: 113 MMHG | HEIGHT: 61 IN | BODY MASS INDEX: 25.49 KG/M2 | DIASTOLIC BLOOD PRESSURE: 76 MMHG | HEART RATE: 85 BPM | WEIGHT: 135 LBS

## 2017-11-22 DIAGNOSIS — N32.89 BLADDER MASS: ICD-10-CM

## 2017-11-22 DIAGNOSIS — R31.0 HEMATURIA, GROSS: Primary | ICD-10-CM

## 2017-11-22 DIAGNOSIS — N39.0 RECURRENT UTI: ICD-10-CM

## 2017-11-22 LAB
BACTERIA UR CULT: NO GROWTH
BILIRUB SERPL-MCNC: ABNORMAL MG/DL
BLOOD URINE, POC: ABNORMAL
COLOR, POC UA: ABNORMAL
GLUCOSE UR QL STRIP: ABNORMAL
KETONES UR QL STRIP: ABNORMAL
LEUKOCYTE ESTERASE URINE, POC: ABNORMAL
NITRITE, POC UA: ABNORMAL
PH, POC UA: 5
PROTEIN, POC: ABNORMAL
SPECIFIC GRAVITY, POC UA: 1.01
UROBILINOGEN, POC UA: ABNORMAL

## 2017-11-22 PROCEDURE — 81002 URINALYSIS NONAUTO W/O SCOPE: CPT | Mod: S$GLB,,, | Performed by: UROLOGY

## 2017-11-22 PROCEDURE — 52000 CYSTOURETHROSCOPY: CPT | Mod: S$GLB,,, | Performed by: UROLOGY

## 2017-11-22 RX ORDER — SULFAMETHOXAZOLE AND TRIMETHOPRIM 800; 160 MG/1; MG/1
1 TABLET ORAL
Status: COMPLETED | OUTPATIENT
Start: 2017-11-22 | End: 2017-11-22

## 2017-11-22 RX ORDER — CIPROFLOXACIN 2 MG/ML
400 INJECTION, SOLUTION INTRAVENOUS
Status: CANCELLED | OUTPATIENT
Start: 2017-11-22

## 2017-11-22 RX ORDER — LIDOCAINE HYDROCHLORIDE 20 MG/ML
JELLY TOPICAL
Status: COMPLETED | OUTPATIENT
Start: 2017-11-22 | End: 2017-11-22

## 2017-11-22 RX ADMIN — LIDOCAINE HYDROCHLORIDE: 20 JELLY TOPICAL at 09:11

## 2017-11-22 RX ADMIN — SULFAMETHOXAZOLE AND TRIMETHOPRIM 1 TABLET: 800; 160 TABLET ORAL at 09:11

## 2017-11-22 NOTE — PROCEDURES
"Cystoscopy  Date/Time: 11/22/2017 8:44 AM  Performed by: YONIS MCLAIN  Authorized by: YONIS MCLAIN     Consent Done?:  Yes (Written)  Time out: Immediately prior to procedure a "time out" was called to verify the correct patient, procedure, equipment, support staff and site/side marked as required.    Indications: hematuria    Position:  Dorsal lithotomy  Anesthesia:  Lidocaine jelly  Patient sedated?: No    Preparation: Patient was prepped and draped in usual sterile fashion      Scope type:  Flexible cystoscope  Stent inserted: No    Stent removed: No    External exam normal: Yes    Digital exam performed: No    Urethra normal: Yes  Bladder neck normal: Bladder neck normal   Bladder normal: No         Number of tumors:  1       Tumor 1:          Size (mm):  10         Anatomy:  Pedunculated         Location:  L Laterall Wall    Patient tolerance:  Patient tolerated the procedure well with no immediate complications     Small papillary tumor noted on L lateral wall. Will need biopsy/TURBT.  MRI done without contrast - will do RPG in OR.       "

## 2017-11-22 NOTE — H&P
"Subjective:       Tess Duncan is a 62 y.o. female who is a new patient who was referred by Dr Crawley for evaluation of recurrent UTIs.       She was referred by PCP for recurrent UTIs. She is reported to have 5 UTIs in the last year. She says this is "related to stress and caffeine." She thinks this is related to not drinking enough water. She was last seen in 8/17 by Waqas NP for s/s of UTI - dysuria, urgency, hematuria. She was given Macrobid for this. Unfortunately, no UCx was sent at that time.      She noted gross hematuria significantly in the past. Hematuria with clots lasted for several hours. She notes hesitancy in AM occasionally. Denies UI. Denies RAHUL. She has been seeing Urgent Care for UTI so there is very limited data available for review. She reports getting Macrobid each time for varying lengths of time.      Denies h/o nephrolithiasis, constipation. Nocturia x 1-2.     UCx:  10/17 - negative  5/16 - 10-49k E coli     PVR (bladder scan) today - 17cc        The following portions of the patient's history were reviewed and updated as appropriate: allergies, current medications, past family history, past medical history, past social history, past surgical history and problem list.     Review of Systems  Constitutional: no fever or chills  ENT: no nasal congestion or sore throat  Respiratory: no cough or shortness of breath  Cardiovascular: no chest pain or palpitations  Gastrointestinal: no nausea or vomiting, tolerating diet  Genitourinary: as per HPI  Hematologic/Lymphatic: no easy bruising or lymphadenopathy  Musculoskeletal: no arthralgias or myalgias  Skin: no rashes or lesions  Neurological: no seizures or tremors  Behavioral/Psych: no auditory or visual hallucinations        Objective:    Vitals: /76 (BP Location: Left arm, Patient Position: Sitting, BP Method: Medium (Automatic))   Pulse 89   Ht 5' (1.524 m)   Wt 63.5 kg (139 lb 14.1 oz)   BMI 27.32 kg/m²      Physical Exam "   General: well developed, well nourished in no acute distress  Head: normocephalic, atraumatic  Neck: supple, trachea midline, no obvious enlargement of thyroid  HEENT: EOMI, mucus membranes moist, sclera anicteric, no hearing impairment  Lungs: symmetric expansion, non-labored breathing  Cardiovascular: regular rate and rhythm, normal pulses  Abdomen: soft, non tender, non distended, no palpable masses, no hepatosplenomegaly, no hernias, no CVA tenderness  Musculoskeletal: no peripheral edema, normal ROM in bilateral upper and lower extremities  Lymphatics: no cervical or inguinal lymphadenopathy  Skin: no rashes or lesions  Neuro: alert and oriented x 3, no gross deficits  Psych: normal judgment and insight, normal mood/affect and non-anxious  Genitourinary:   patient declined exam        Lab Review   Urine analysis today in clinic shows negative for all components            Lab Results   Component Value Date     WBC 6.43 02/29/2016     HGB 14.3 02/29/2016     HCT 43.7 02/29/2016     MCV 90 02/29/2016      02/29/2016            Lab Results   Component Value Date     CREATININE 0.9 02/14/2017     BUN 21 (H) 02/14/2017         Imaging  NA        Assessment/Plan:      1. Recurrent UTI    - Discussed UTI prevention strategies.   - Adequate hydration.   - Double voiding. Consider timed voiding.    - Avoid constipation.   - Will assess upper tracts with MRI   - Cystoscopy   - Cranberry/probiotics    - Estrace cream 2x weekly. Will need to check with PCP regarding contraindication 2/2 liver hemangioma.    - PVR acceptable   - Call with UTI symptoms so UA/UCx can be sent.    2. Gross hematuria   - Discussed etiology and workup of hematuria   - Likely related to UTI but due to severity would recommend workup   - UCx - UA clear, not indicated today   - Cytology - not indicated   - CT urogram - unable to do 2/2 iodine contrast allergy. Recommend MRI urogram.    - Office cystoscopy        Scope type:  Flexible  cystoscope  Stent inserted: No    Stent removed: No    External exam normal: Yes    Digital exam performed: No    Urethra normal: Yes  Bladder neck normal: Bladder neck normal   Bladder normal: No         Number of tumors:  1       Tumor 1:          Size (mm):  10         Anatomy:  Pedunculated         Location:  L Laterall Wall    Patient tolerance:  Patient tolerated the procedure well with no immediate complications     Small papillary tumor noted on L lateral wall. Will need biopsy/TURBT.  MRI done without contrast - will do RPG in OR.     OR 11/29/17

## 2017-11-24 ENCOUNTER — HOSPITAL ENCOUNTER (OUTPATIENT)
Dept: PREADMISSION TESTING | Facility: OTHER | Age: 62
Discharge: HOME | End: 2017-11-24
Attending: UROLOGY
Payer: COMMERCIAL

## 2017-11-24 ENCOUNTER — ANESTHESIA EVENT (OUTPATIENT)
Dept: SURGERY | Facility: OTHER | Age: 62
End: 2017-11-24
Payer: COMMERCIAL

## 2017-11-24 VITALS
TEMPERATURE: 99 F | SYSTOLIC BLOOD PRESSURE: 117 MMHG | BODY MASS INDEX: 25.91 KG/M2 | DIASTOLIC BLOOD PRESSURE: 67 MMHG | WEIGHT: 132 LBS | HEIGHT: 60 IN | OXYGEN SATURATION: 98 % | HEART RATE: 82 BPM

## 2017-11-24 LAB
BASOPHILS # BLD AUTO: 0.04 K/UL
BASOPHILS NFR BLD: 0.6 %
DIFFERENTIAL METHOD: ABNORMAL
EOSINOPHIL # BLD AUTO: 0.3 K/UL
EOSINOPHIL NFR BLD: 4.4 %
ERYTHROCYTE [DISTWIDTH] IN BLOOD BY AUTOMATED COUNT: 13.2 %
HCT VFR BLD AUTO: 44.3 %
HGB BLD-MCNC: 14.4 G/DL
LYMPHOCYTES # BLD AUTO: 1.1 K/UL
LYMPHOCYTES NFR BLD: 16.9 %
MCH RBC QN AUTO: 30.4 PG
MCHC RBC AUTO-ENTMCNC: 32.5 G/DL
MCV RBC AUTO: 94 FL
MONOCYTES # BLD AUTO: 0.3 K/UL
MONOCYTES NFR BLD: 5.2 %
NEUTROPHILS # BLD AUTO: 4.6 K/UL
NEUTROPHILS NFR BLD: 72.7 %
PLATELET # BLD AUTO: 277 K/UL
PMV BLD AUTO: 10.3 FL
RBC # BLD AUTO: 4.74 M/UL
WBC # BLD AUTO: 6.32 K/UL

## 2017-11-24 PROCEDURE — 36415 COLL VENOUS BLD VENIPUNCTURE: CPT

## 2017-11-24 PROCEDURE — 85025 COMPLETE CBC W/AUTO DIFF WBC: CPT

## 2017-11-24 RX ORDER — LIDOCAINE HYDROCHLORIDE 10 MG/ML
1 INJECTION, SOLUTION EPIDURAL; INFILTRATION; INTRACAUDAL; PERINEURAL ONCE
Status: CANCELLED | OUTPATIENT
Start: 2017-11-24 | End: 2017-11-24

## 2017-11-24 RX ORDER — MIDAZOLAM HYDROCHLORIDE 5 MG/ML
5 INJECTION INTRAMUSCULAR; INTRAVENOUS
Status: ACTIVE | OUTPATIENT
Start: 2017-11-24 | End: 2017-11-25

## 2017-11-24 RX ORDER — MIDAZOLAM HYDROCHLORIDE 1 MG/ML
5 INJECTION INTRAMUSCULAR; INTRAVENOUS
Status: DISCONTINUED | OUTPATIENT
Start: 2017-11-24 | End: 2017-11-24 | Stop reason: SDUPTHER

## 2017-11-24 RX ORDER — SODIUM CHLORIDE, SODIUM LACTATE, POTASSIUM CHLORIDE, CALCIUM CHLORIDE 600; 310; 30; 20 MG/100ML; MG/100ML; MG/100ML; MG/100ML
INJECTION, SOLUTION INTRAVENOUS CONTINUOUS
Status: CANCELLED | OUTPATIENT
Start: 2017-11-24

## 2017-11-24 NOTE — ANESTHESIA PREPROCEDURE EVALUATION
11/24/2017  Tess Duncan is a 62 y.o., female.    Anesthesia Evaluation    I have reviewed the Patient Summary Reports.    I have reviewed the Nursing Notes.   I have reviewed the Medications.     Review of Systems  Anesthesia Hx:  Denies Family Hx of Anesthesia complications.   Denies Personal Hx of Anesthesia complications.   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       Physical Exam  General:  Well nourished    Airway/Jaw/Neck:  Airway Findings: Mouth Opening: Normal Tongue: Normal  General Airway Assessment: Adult  Mallampati: II  TM Distance: Normal, at least 6 cm      Dental:  Dental Findings: In tact, molar caps, upper front caps        Mental Status:  Mental Status Findings:  Alert and Oriented, Cooperative         Anesthesia Plan  Type of Anesthesia, risks & benefits discussed:  Anesthesia Type:  general, MAC  Patient's Preference:   Intra-op Monitoring Plan: standard ASA monitors  Intra-op Monitoring Plan Comments:   Post Op Pain Control Plan:   Post Op Pain Control Plan Comments:   Induction:   IV  Beta Blocker:         Informed Consent: Patient understands risks and agrees with Anesthesia plan.  Questions answered. Anesthesia consent signed with patient.  ASA Score: 3     Day of Surgery Review of History & Physical:    H&P update referred to the surgeon.     Anesthesia Plan Notes: GA vs MAC.  Pt may need steroid due to Kranthi's.  Will double her prednisone on am of surgery.        Ready For Surgery From Anesthesia Perspective.

## 2017-11-24 NOTE — DISCHARGE INSTRUCTIONS
PRE-ADMIT TESTING -  [phone]    [address]  [place] [facility]          Your surgery has been scheduled at [facility]. We are pleased to have the opportunity to serve you. For Further Information please call [phone].    On the day of surgery please report to the Information Desk on the 1st floor.    · CONTACT YOUR PHYSICIAN'S OFFICE THE DAY PRIOR TO YOUR SURGERY TO OBTAIN YOUR ARRIVAL TIME.     · The evening before surgery do not eat anything after 9 p.m. ( this includes hard candy, chewing gum and mints).  You may only have GATORADE, POWERADE AND WATER  from 9 p.m. until you leave your home.   DO NOT DRINK ANY LIQUIDS ON THE WAY TO THE HOSPITAL.      SPECIAL MEDICATION INSTRUCTIONS: TAKE medications checked off by the Anesthesiologist on your Medication List.    Angiogram Patients: Take medications as instructed by your physician, including aspirin.     Surgery Patients:    If you take ASPIRIN - Your PHYSICIAN/SURGEON will need to inform you IF/OR when you need to stop taking aspirin prior to your surgery.     Do Not take any medications containing IBUPROFEN.  Do Not Wear any make-up or dark nail polish   (especially eye make-up) to surgery. If you come to surgery with makeup on you will be required to remove the makeup or nail polish.    Do not shave your surgical area at least 5 days prior to your surgery. The surgical prep will be performed at the hospital according to Infection Control regulations.    Leave all valuables at home.   Do Not wear any jewelry or watches, including any metal in body piercings.  Contact Lens must be removed before surgery. Either do not wear the contact lens or bring a case and solution for storage.  Please bring a container for eyeglasses or dentures as required.  Bring any paperwork your physician has provided, such as consent forms,  history and physicals, doctor's orders, etc.   Bring comfortable clothes that are loose fitting to wear upon  discharge. Take into consideration the type of surgery being performed.  Maintain your diet as advised per your physician the day prior to surgery.      Adequate rest the night before surgery is advised.   Park in the Parking lot behind the hospital or in the [place] [place] [facility] across the street from the parking lot. Parking is complimentary.  If you will be discharged the same day as your procedure, please arrange for a responsible adult to drive you home or to accompany you if traveling by taxi.   YOU WILL NOT BE PERMITTED TO DRIVE OR TO LEAVE THE HOSPITAL ALONE AFTER SURGERY.   It is strongly recommended that you arrange for someone to remain with you for the first 24 hrs following your surgery.       Thank you for your cooperation.  The Staff of [facility].        Bathing Instructions                                                                 Please shower the evening before and morning of your procedure with    ANTIBACTERIAL SOAP. ( DIAL, etc )  Concentrate on the surgical area   for at least 3 minutes and rinse completely. Dry off as usual.   Do not use any deodorant, powder, body lotions, perfume, after shave or    cologne.

## 2017-11-28 ENCOUNTER — TELEPHONE (OUTPATIENT)
Dept: UROLOGY | Facility: CLINIC | Age: 62
End: 2017-11-28

## 2017-11-28 NOTE — TELEPHONE ENCOUNTER
----- Message from Emelia Phillips sent at 11/28/2017 10:20 AM CST -----  Contact: pt  _  1st Request  _  2nd Request  _  3rd Request        Who: pt    Why: Requesting a call back in regards to wants to check on times with the nurse for her procedure tomorrow. Please call pt    What Number to Call Back:[phone]    When to Expect a call back: (Within 24 hours)    Please return the call at earliest convenience. Thanks!

## 2017-11-29 ENCOUNTER — SURGERY (OUTPATIENT)
Age: 62
End: 2017-11-29

## 2017-11-29 ENCOUNTER — HOSPITAL ENCOUNTER (OUTPATIENT)
Facility: OTHER | Age: 62
Discharge: HOME | End: 2017-11-29
Attending: UROLOGY | Admitting: UROLOGY
Payer: COMMERCIAL

## 2017-11-29 ENCOUNTER — ANESTHESIA (OUTPATIENT)
Dept: SURGERY | Facility: OTHER | Age: 62
End: 2017-11-29
Payer: COMMERCIAL

## 2017-11-29 VITALS
TEMPERATURE: 98 F | HEIGHT: 60 IN | SYSTOLIC BLOOD PRESSURE: 108 MMHG | WEIGHT: 132 LBS | HEART RATE: 74 BPM | BODY MASS INDEX: 25.91 KG/M2 | RESPIRATION RATE: 18 BRPM | DIASTOLIC BLOOD PRESSURE: 62 MMHG | OXYGEN SATURATION: 98 %

## 2017-11-29 DIAGNOSIS — D49.4 BLADDER TUMOR: Primary | ICD-10-CM

## 2017-11-29 DIAGNOSIS — R31.0 HEMATURIA, GROSS: ICD-10-CM

## 2017-11-29 PROCEDURE — 36000707: Performed by: UROLOGY

## 2017-11-29 PROCEDURE — 88305 TISSUE EXAM BY PATHOLOGIST: CPT | Mod: 26,,, | Performed by: PATHOLOGY

## 2017-11-29 PROCEDURE — C1758 CATHETER, URETERAL: HCPCS | Performed by: UROLOGY

## 2017-11-29 PROCEDURE — 63600175 PHARM REV CODE 636 W HCPCS: Performed by: NURSE ANESTHETIST, CERTIFIED REGISTERED

## 2017-11-29 PROCEDURE — 25000003 PHARM REV CODE 250: Performed by: ANESTHESIOLOGY

## 2017-11-29 PROCEDURE — 88305 TISSUE EXAM BY PATHOLOGIST: CPT | Performed by: PATHOLOGY

## 2017-11-29 PROCEDURE — 71000015 HC POSTOP RECOV 1ST HR: Performed by: UROLOGY

## 2017-11-29 PROCEDURE — 74420 UROGRAPHY RTRGR +-KUB: CPT | Mod: 26,,, | Performed by: UROLOGY

## 2017-11-29 PROCEDURE — 37000009 HC ANESTHESIA EA ADD 15 MINS: Performed by: UROLOGY

## 2017-11-29 PROCEDURE — 37000008 HC ANESTHESIA 1ST 15 MINUTES: Performed by: UROLOGY

## 2017-11-29 PROCEDURE — 25000003 PHARM REV CODE 250: Performed by: NURSE ANESTHETIST, CERTIFIED REGISTERED

## 2017-11-29 PROCEDURE — 63600175 PHARM REV CODE 636 W HCPCS: Performed by: UROLOGY

## 2017-11-29 PROCEDURE — 25500020 PHARM REV CODE 255: Performed by: UROLOGY

## 2017-11-29 PROCEDURE — 52224 CYSTOSCOPY AND TREATMENT: CPT | Mod: ,,, | Performed by: UROLOGY

## 2017-11-29 PROCEDURE — 36000706: Performed by: UROLOGY

## 2017-11-29 PROCEDURE — 63600175 PHARM REV CODE 636 W HCPCS: Performed by: ANESTHESIOLOGY

## 2017-11-29 PROCEDURE — 52005 CYSTO W/URTRL CATHJ: CPT | Mod: 59,,, | Performed by: UROLOGY

## 2017-11-29 PROCEDURE — 71000033 HC RECOVERY, INTIAL HOUR: Performed by: UROLOGY

## 2017-11-29 RX ORDER — MIDAZOLAM HYDROCHLORIDE 1 MG/ML
INJECTION, SOLUTION INTRAMUSCULAR; INTRAVENOUS
Status: DISCONTINUED | OUTPATIENT
Start: 2017-11-29 | End: 2017-11-29

## 2017-11-29 RX ORDER — FENTANYL CITRATE 50 UG/ML
25 INJECTION, SOLUTION INTRAMUSCULAR; INTRAVENOUS EVERY 5 MIN PRN
Status: DISCONTINUED | OUTPATIENT
Start: 2017-11-29 | End: 2017-11-29 | Stop reason: HOSPADM

## 2017-11-29 RX ORDER — FENTANYL CITRATE 50 UG/ML
INJECTION, SOLUTION INTRAMUSCULAR; INTRAVENOUS
Status: DISCONTINUED | OUTPATIENT
Start: 2017-11-29 | End: 2017-11-29

## 2017-11-29 RX ORDER — LIDOCAINE HCL/PF 100 MG/5ML
SYRINGE (ML) INTRAVENOUS
Status: DISCONTINUED | OUTPATIENT
Start: 2017-11-29 | End: 2017-11-29

## 2017-11-29 RX ORDER — ONDANSETRON 2 MG/ML
4 INJECTION INTRAMUSCULAR; INTRAVENOUS DAILY PRN
Status: DISCONTINUED | OUTPATIENT
Start: 2017-11-29 | End: 2017-11-29 | Stop reason: HOSPADM

## 2017-11-29 RX ORDER — ONDANSETRON 2 MG/ML
4 INJECTION INTRAMUSCULAR; INTRAVENOUS EVERY 12 HOURS PRN
Status: DISCONTINUED | OUTPATIENT
Start: 2017-11-29 | End: 2017-11-29 | Stop reason: HOSPADM

## 2017-11-29 RX ORDER — OXYCODONE HYDROCHLORIDE 5 MG/1
5 TABLET ORAL
Status: DISCONTINUED | OUTPATIENT
Start: 2017-11-29 | End: 2017-11-29 | Stop reason: HOSPADM

## 2017-11-29 RX ORDER — ACETAMINOPHEN 325 MG/1
650 TABLET ORAL EVERY 4 HOURS PRN
Status: DISCONTINUED | OUTPATIENT
Start: 2017-11-29 | End: 2017-11-29 | Stop reason: HOSPADM

## 2017-11-29 RX ORDER — HYDROCODONE BITARTRATE AND ACETAMINOPHEN 5; 325 MG/1; MG/1
1 TABLET ORAL EVERY 6 HOURS PRN
Qty: 8 TABLET | Refills: 0 | Status: ON HOLD | OUTPATIENT
Start: 2017-11-29 | End: 2018-01-10

## 2017-11-29 RX ORDER — LIDOCAINE HYDROCHLORIDE 10 MG/ML
1 INJECTION, SOLUTION EPIDURAL; INFILTRATION; INTRACAUDAL; PERINEURAL ONCE
Status: DISCONTINUED | OUTPATIENT
Start: 2017-11-29 | End: 2017-11-29 | Stop reason: HOSPADM

## 2017-11-29 RX ORDER — PHENAZOPYRIDINE HYDROCHLORIDE 100 MG/1
200 TABLET, FILM COATED ORAL 3 TIMES DAILY PRN
Qty: 18 TABLET | Refills: 0 | Status: SHIPPED | OUTPATIENT
Start: 2017-11-29 | End: 2018-01-24

## 2017-11-29 RX ORDER — MEPERIDINE HYDROCHLORIDE 50 MG/ML
12.5 INJECTION INTRAMUSCULAR; INTRAVENOUS; SUBCUTANEOUS ONCE AS NEEDED
Status: COMPLETED | OUTPATIENT
Start: 2017-11-29 | End: 2017-11-29

## 2017-11-29 RX ORDER — HYDROMORPHONE HYDROCHLORIDE 2 MG/ML
0.4 INJECTION, SOLUTION INTRAMUSCULAR; INTRAVENOUS; SUBCUTANEOUS EVERY 5 MIN PRN
Status: DISCONTINUED | OUTPATIENT
Start: 2017-11-29 | End: 2017-11-29 | Stop reason: HOSPADM

## 2017-11-29 RX ORDER — CIPROFLOXACIN 2 MG/ML
400 INJECTION, SOLUTION INTRAVENOUS
Status: COMPLETED | OUTPATIENT
Start: 2017-11-29 | End: 2017-11-29

## 2017-11-29 RX ORDER — HYDROCODONE BITARTRATE AND ACETAMINOPHEN 5; 325 MG/1; MG/1
1 TABLET ORAL EVERY 4 HOURS PRN
Status: DISCONTINUED | OUTPATIENT
Start: 2017-11-29 | End: 2017-11-29 | Stop reason: HOSPADM

## 2017-11-29 RX ORDER — PROPOFOL 10 MG/ML
VIAL (ML) INTRAVENOUS
Status: DISCONTINUED | OUTPATIENT
Start: 2017-11-29 | End: 2017-11-29

## 2017-11-29 RX ORDER — CIPROFLOXACIN 500 MG/1
500 TABLET ORAL 2 TIMES DAILY
Qty: 4 TABLET | Refills: 0 | Status: SHIPPED | OUTPATIENT
Start: 2017-11-29 | End: 2017-12-01

## 2017-11-29 RX ORDER — SODIUM CHLORIDE 0.9 % (FLUSH) 0.9 %
3 SYRINGE (ML) INJECTION
Status: DISCONTINUED | OUTPATIENT
Start: 2017-11-29 | End: 2017-11-29 | Stop reason: HOSPADM

## 2017-11-29 RX ORDER — SODIUM CHLORIDE, SODIUM LACTATE, POTASSIUM CHLORIDE, CALCIUM CHLORIDE 600; 310; 30; 20 MG/100ML; MG/100ML; MG/100ML; MG/100ML
INJECTION, SOLUTION INTRAVENOUS CONTINUOUS
Status: DISCONTINUED | OUTPATIENT
Start: 2017-11-29 | End: 2017-11-29 | Stop reason: HOSPADM

## 2017-11-29 RX ORDER — ONDANSETRON HYDROCHLORIDE 2 MG/ML
INJECTION, SOLUTION INTRAMUSCULAR; INTRAVENOUS
Status: DISCONTINUED | OUTPATIENT
Start: 2017-11-29 | End: 2017-11-29

## 2017-11-29 RX ORDER — PHENAZOPYRIDINE HYDROCHLORIDE 100 MG/1
200 TABLET, FILM COATED ORAL 3 TIMES DAILY PRN
Status: DISCONTINUED | OUTPATIENT
Start: 2017-11-29 | End: 2017-11-29 | Stop reason: HOSPADM

## 2017-11-29 RX ADMIN — CIPROFLOXACIN 400 MG: 2 INJECTION, SOLUTION INTRAVENOUS at 08:11

## 2017-11-29 RX ADMIN — IOHEXOL 50 ML: 300 INJECTION, SOLUTION INTRAVENOUS at 09:11

## 2017-11-29 RX ADMIN — CARBOXYMETHYLCELLULOSE SODIUM 2 DROP: 2.5 SOLUTION/ DROPS OPHTHALMIC at 08:11

## 2017-11-29 RX ADMIN — FENTANYL CITRATE 50 MCG: 50 INJECTION, SOLUTION INTRAMUSCULAR; INTRAVENOUS at 08:11

## 2017-11-29 RX ADMIN — OXYCODONE HYDROCHLORIDE 5 MG: 5 TABLET ORAL at 09:11

## 2017-11-29 RX ADMIN — PROPOFOL 120 MG: 10 INJECTION, EMULSION INTRAVENOUS at 08:11

## 2017-11-29 RX ADMIN — MIDAZOLAM 2 MG: 1 INJECTION INTRAMUSCULAR; INTRAVENOUS at 08:11

## 2017-11-29 RX ADMIN — PROPOFOL 80 MG: 10 INJECTION, EMULSION INTRAVENOUS at 08:11

## 2017-11-29 RX ADMIN — SODIUM CHLORIDE, SODIUM LACTATE, POTASSIUM CHLORIDE, AND CALCIUM CHLORIDE: 600; 310; 30; 20 INJECTION, SOLUTION INTRAVENOUS at 08:11

## 2017-11-29 RX ADMIN — MEPERIDINE HYDROCHLORIDE 12.5 MG: 50 INJECTION INTRAMUSCULAR; INTRAVENOUS; SUBCUTANEOUS at 09:11

## 2017-11-29 RX ADMIN — ONDANSETRON 4 MG: 2 INJECTION, SOLUTION INTRAMUSCULAR; INTRAVENOUS at 09:11

## 2017-11-29 RX ADMIN — LIDOCAINE HYDROCHLORIDE 40 MG: 20 INJECTION, SOLUTION INTRAVENOUS at 08:11

## 2017-11-29 NOTE — PLAN OF CARE
Tess LEON Duncan has met all discharge criteria from Phase II. Vital Signs are stable, ambulating  without difficulty. Discharge instructions given, patient verbalized understanding. Discharged from facility via wheelchair in stable condition.

## 2017-11-29 NOTE — TRANSFER OF CARE
Anesthesia Transfer of Care Note    Patient: Tess Duncan    Procedure(s) Performed: Procedure(s) (LRB):  BIOPSY-BLADDER, cystoscopy, retrograde pyelogram (Bilateral)    Patient location: PACU    Anesthesia Type: general    Transport from OR: Transported from OR on room air with adequate spontaneous ventilation    Post pain: adequate analgesia    Post assessment: no apparent anesthetic complications    Post vital signs: stable    Level of consciousness: awake    Nausea/Vomiting: no nausea/vomiting    Complications: none    Transfer of care protocol was followed      Last vitals:   Visit Vitals  /84   Pulse 96   Temp 36.6 °C (97.9 °F) (Oral)   Resp 18   Ht 5' (1.524 m)   Wt 59.9 kg (132 lb)   SpO2 100%   Breastfeeding? No   BMI 25.78 kg/m²

## 2017-11-29 NOTE — DISCHARGE INSTRUCTIONS
Expect blood and/or burning with urination. Drink plenty of fluids. Light activity for 3 days. Avoid strenuous activity if blood in urine is noted.

## 2017-11-29 NOTE — ANESTHESIA POSTPROCEDURE EVALUATION
Anesthesia Post Evaluation    Patient: Tess Duncan    Procedure(s) Performed: Procedure(s) (LRB):  BIOPSY-BLADDER, cystoscopy, retrograde pyelogram (Bilateral)    Final Anesthesia Type: general  Patient location during evaluation: PACU  Patient participation: Yes- Able to Participate  Level of consciousness: awake and alert  Post-procedure vital signs: reviewed and stable  Pain management: adequate  Airway patency: patent  PONV status at discharge: No PONV  Anesthetic complications: no      Cardiovascular status: blood pressure returned to baseline  Respiratory status: unassisted, spontaneous ventilation and room air  Hydration status: euvolemic  Follow-up not needed.        Visit Vitals  BP (!) 119/59   Pulse 75   Temp 36.8 °C (98.3 °F) (Oral)   Resp 18   Ht 5' (1.524 m)   Wt 59.9 kg (132 lb)   SpO2 96%   Breastfeeding? No   BMI 25.78 kg/m²       Pain/Teagan Score: Pain Assessment Performed: Yes (11/29/2017 10:15 AM)  Presence of Pain: denies (11/29/2017 10:15 AM)  Pain Rating Prior to Med Admin: 0 (11/29/2017  9:50 AM)  Teagan Score: 10 (11/29/2017 10:15 AM)

## 2017-11-29 NOTE — OP NOTE
DATE OF PROCEDURE:  11/29/2017.    SURGEON:  Whitney Gee M.D.    PREOPERATIVE DIAGNOSES:  Bladder tumor and gross hematuria.    POSTOPERATIVE DIAGNOSES:  Bladder tumor and gross hematuria.    PROCEDURE PERFORMED:  Cystoscopy with bladder biopsy, fulguration and bilateral   retrograde pyelogram.    INDICATIONS FOR PROCEDURE:  Ms. Duncan is a 62-year-old female who was noted to   have recurrent urinary tract infections.  She is also noted to have gross   hematuria with clots that had lasted for several hours.  She underwent office   cystoscopy, which showed a small papillary tumor in the left lateral bladder   wall.  She presents today for definitive treatment of this.    DESCRIPTION OF PROCEDURE:  Ms. Duncan was brought to the operating room and   placed under general LMA anesthesia.  Full timeout procedures were performed   identifying the correct patient and procedure.  Appropriate IV antibiotics with   Cipro was given prior to commencement of surgery.  The patient was placed in the   dorsal lithotomy position and prepped and draped in the usual sterile fashion.    A 22-Gambian rigid cystoscope was passed per urethra into the bladder.  Full   cystoscopy was performed, which showed a small papillary lesion in the left   lateral bladder wall, approximately 3-4 cm away from the left ureteral orifice.    There were feeding vessels noted to the area of the tumor around the left   ureteral orifice.  No other tumors or lesions were noted throughout the entirety   of the bladder.    Next, retrograde pyelogram was performed first on the patient's right side.  The   cone-tip ureteral catheter was attempted to be used initially, but was unable   to get the contrast to get retrograde.  For that reason, it was switched to a   5-Gambian open-ended ureteral catheter where the contrast was able to easily pass   into the ureters.  Full strength Omnipaque solution was used under gentle   pressure.  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  There was only one   ureteral orifice present.  The ureters were noted to be delicate in nature and   no filling defects, hydroureteronephrosis or other abnormalities noted.  Similar   procedure was performed on the patient's left side.  There was noted to be a   single ureter on this side again noted to be delicate in nature.  There were no   filling defects or other abnormalities noted.    Contrast was noted to drain well from bilateral collecting systems.  The   contrast was drained from the bladder prior to proceeding with bladder biopsy.    Next, the rigid biopsy forceps were placed into the bladder and 2 biopsies were   done of the area of the papillary lesion.  This was noted to remove the lesion   in its entirety.  Bugbee cautery was then used for fulguration for hemostasis.    The feeding vessel was also fulgurated in a small area and the trigone was   fulgurated, that was noted to have a small area of bleeding, likely from trauma   from the cystoscope.  There was no tumor or other abnormality noted there.  The   bladder was then drained and reinstilled and the area was further inspected and   was noted to be hemostatic.  Pictures were taken before and after the bladder   biopsy.  The bladder was then drained final time and the cystoscope was removed.    The patient was then awakened from general anesthesia and transferred to the   PACU in stable condition.    COMPLICATIONS:  None.    FINDINGS:  Small papillary tumor in the left lateral bladder wall.  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    ESTIMATED BLOOD LOSS:  Less than 5 mL    SPECIMENS:  Bladder tumor.    DRAINS:  None.    PATIENT DISPOSITION:  The patient is stable and deemed for discharge home.  She   will follow up in approximately 2 weeks for postoperative check and review of   pathology.      EKP/HN  dd: 11/29/2017  09:37:31 (CST)  td: 11/29/2017 11:08:08 (CST)  Doc ID   #[id number]  Job ID #[id number]    CC:

## 2017-11-29 NOTE — BRIEF OP NOTE
[facility]  Brief Operative Note     SUMMARY     Surgery Date: 11/29/2017     Surgeon(s) and Role:     * Whitney Gee MD - Primary    Assisting Surgeon: None    Pre-op Diagnosis:  Hematuria, gross [R31.0]    Post-op Diagnosis:  Post-Op Diagnosis Codes:     * Hematuria, gross [R31.0]    Procedure(s) (LRB):  Cystoscopy  Bladder biopsy  Fulguration  Bilateral retrograde pyelogram     Anesthesia: General    Description of the findings of the procedure: Small papillary tumor in L lateral bladder wall, 3-4cm away from L UO. 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    Findings/Key Components: Bladder biopsy.    Estimated Blood Loss: <5cc         Specimens:   Specimen (12h ago through future)    Start     Ordered    11/29/17 0921  Specimen to Pathology - Surgery  Once     Comments:  1. BLADDER TUMOR      11/29/17 0921          Discharge Note    SUMMARY     Admit Date: 11/29/2017    Discharge Date and Time:  11/29/2017 9:32 AM    Hospital Course (synopsis of major diagnoses, care, treatment, and services provided during the course of the hospital stay): Uncomplicated BBx     Final Diagnosis: Post-Op Diagnosis Codes:     * Hematuria, gross [R31.0]    Disposition: Home or Self Care    Follow Up/Patient Instructions:     Medications:  Reconciled Home Medications:   Current Discharge Medication List      START taking these medications    Details   ciprofloxacin HCl (CIPRO) 500 MG tablet Take 1 tablet (500 mg total) by mouth 2 (two) times daily.  Qty: 4 tablet, Refills: 0      hydrocodone-acetaminophen 5-325mg (NORCO) 5-325 mg per tablet Take 1 tablet by mouth every 6 (six) hours as needed for Pain.  Qty: 8 tablet, Refills: 0      phenazopyridine (PYRIDIUM) 100 MG tablet Take 2 tablets (200 mg total) by mouth 3 (three) times daily as needed for Pain.  Qty: 18 tablet, Refills: 0         CONTINUE these medications which have NOT CHANGED    Details   calcium  carbonate (CORAL CALCIUM) 390 mg  (1,000 mg) Tab Take by mouth.      fludrocortisone (FLORINEF) 0.1 mg Tab Take 1 tablet (100 mcg total) by mouth once daily.  Qty: 30 tablet, Refills: 11      levothyroxine (SYNTHROID) 88 MCG tablet Take 1 tablet (88 mcg total) by mouth once daily.  Qty: 30 tablet, Refills: 11      prasterone, dhea, (DHEA) 25 mg Cap Take 20 mg by mouth.      predniSONE (DELTASONE) 5 MG tablet TAKE 1 TABLET BY MOUTH EVERY MORNING AND 1/2 TABLET EVERY EVENING  Qty: 48 tablet, Refills: 6      !! UNABLE TO FIND Take by mouth once daily.  MG       !! UNABLE TO FIND Take by mouth once daily. TUMERIC       VIT C/E/B6/FA/B12/ARGIN/PEP XT (CARDIOTEK, BIOPERINE, ORAL) Take by mouth.      vitamin D 1000 units Tab Take 185 mg by mouth once daily.      alendronate (FOSAMAX) 70 MG tablet 1 tablet on Sunday with a full glass of water and wait 30 minutes for breakfast and pills. Take sitting or standing  Qty: 4 tablet, Refills: 12       !! - Potential duplicate medications found. Please discuss with provider.          Discharge Procedure Orders  Diet general     Activity as tolerated     Call MD for:  temperature >100.4     Call MD for:  persistent nausea and vomiting     Call MD for:  severe uncontrolled pain     Call MD for:  difficulty breathing, headache or visual disturbances     No dressing needed       Follow-up Information     Whitney Gee MD In 2 weeks.    Specialty:  Urology  Why:  For post-op follow up  Contact information:  [address] [place] [address] [id number] [phone] #[phone]

## 2017-11-29 NOTE — PROGRESS NOTES
Pt denies any pain or discomfort at this time.   Pt is on rooomair @ 96%   Pt is sleeping appear to be comfortable.

## 2017-11-30 ENCOUNTER — TELEPHONE (OUTPATIENT)
Dept: UROLOGY | Facility: CLINIC | Age: 62
End: 2017-11-30

## 2017-11-30 NOTE — TELEPHONE ENCOUNTER
----- Message from Vinh Lee sent at 11/30/2017  8:26 AM CST -----  Contact: Self   Pt called in reference to scheduling a post appt  and would like a callback at ([phone])

## 2017-12-01 ENCOUNTER — HOSPITAL ENCOUNTER (OUTPATIENT)
Dept: RADIOLOGY | Facility: OTHER | Age: 62
Discharge: HOME | End: 2017-12-01
Attending: INTERNAL MEDICINE
Payer: COMMERCIAL

## 2017-12-01 DIAGNOSIS — Z12.31 ENCOUNTER FOR SCREENING MAMMOGRAM FOR MALIGNANT NEOPLASM OF BREAST: ICD-10-CM

## 2017-12-01 PROCEDURE — 77063 BREAST TOMOSYNTHESIS BI: CPT | Mod: 26,,, | Performed by: RADIOLOGY

## 2017-12-01 PROCEDURE — 77067 SCR MAMMO BI INCL CAD: CPT | Mod: 26,,, | Performed by: RADIOLOGY

## 2017-12-01 PROCEDURE — 77067 SCR MAMMO BI INCL CAD: CPT | Mod: TC

## 2017-12-13 ENCOUNTER — OFFICE VISIT (OUTPATIENT)
Dept: UROLOGY | Facility: CLINIC | Age: 62
End: 2017-12-13
Attending: UROLOGY
Payer: COMMERCIAL

## 2017-12-13 VITALS
DIASTOLIC BLOOD PRESSURE: 57 MMHG | WEIGHT: 132.06 LBS | HEIGHT: 60 IN | HEART RATE: 89 BPM | SYSTOLIC BLOOD PRESSURE: 117 MMHG | BODY MASS INDEX: 25.93 KG/M2

## 2017-12-13 DIAGNOSIS — N39.0 RECURRENT UTI: ICD-10-CM

## 2017-12-13 DIAGNOSIS — C67.2 MALIGNANT NEOPLASM OF LATERAL WALL OF BLADDER: Primary | ICD-10-CM

## 2017-12-13 DIAGNOSIS — R31.0 HEMATURIA, GROSS: ICD-10-CM

## 2017-12-13 LAB
BILIRUB SERPL-MCNC: ABNORMAL MG/DL
BLOOD URINE, POC: ABNORMAL
COLOR, POC UA: ABNORMAL
GLUCOSE UR QL STRIP: NORMAL
KETONES UR QL STRIP: ABNORMAL
LEUKOCYTE ESTERASE URINE, POC: ABNORMAL
NITRITE, POC UA: ABNORMAL
PH, POC UA: 6
PROTEIN, POC: ABNORMAL
SPECIFIC GRAVITY, POC UA: 1
UROBILINOGEN, POC UA: NORMAL

## 2017-12-13 PROCEDURE — 99214 OFFICE O/P EST MOD 30 MIN: CPT | Mod: 25,S$GLB,, | Performed by: UROLOGY

## 2017-12-13 PROCEDURE — 81002 URINALYSIS NONAUTO W/O SCOPE: CPT | Mod: S$GLB,,, | Performed by: UROLOGY

## 2017-12-13 RX ORDER — CIPROFLOXACIN 2 MG/ML
400 INJECTION, SOLUTION INTRAVENOUS
Status: CANCELLED | OUTPATIENT
Start: 2017-12-13

## 2017-12-13 NOTE — PATIENT INSTRUCTIONS
Treating Bladder Cancer: Intravesical Therapy    Some types of bladder tumors are hard to remove completely with surgery. These tumors tend to be high grade. This means they are more likely to grow and spread quickly. They may happen in more than one area and they may be flat against the bladder wall. They may have come back after treatment. In these cases, special medicines that kill cancer cells may be put right inside the bladder. This is called intravesical therapy. It may be a choice if you have a hard-to-remove tumor. Or it may be done after surgery to help keep the cancer from coming back.  Medicine inside your bladder  Intravesical therapy is often done in a healthcare providers office or outpatient clinic. A flexible tube (catheter) is passed through the urethra and into the bladder. The catheter is used to fill the bladder with a liquid medicine. This may be a liquid chemotherapy medicine. It kills cancer cells. Or it may be BCG (Bacillus of Calmette and Usha). This is a type of bacteria that helps boost your bodys immune system so that it kills the cancer cells.  During treatment  You will need to hold the medicine in your bladder for 2 hours. In some cases, the catheter may be left in and the medicine is removed through it when treatment is done. In other cases, the catheter is taken out after the medicine is put in. Then you will urinate after the 2 hours are over. If BCG is used, you may need to pour bleach in the toilet after you urinate. This kills bacteria that may be left over. Intravesical therapy is given weekly for 6 to 8 weeks. During this time, you may be given antibiotics. This is to help prevent infection. It will depend on the medicine that is used for you.   After treatment  After your initial BCG treatment, you may need to have follow-up treatments for up to a year or more. This is called maintenance BCG. These help keep the cancer from coming back. When all the treatments are done,  you may have tests done every few months to help check for cancer cells.  Risks and possible complications  Be aware of the following:  · Bladder infection  · Blood in the urine  · Bladder irritation (burning, need to urinate often, pain on urination)  · Changes in your blood cell counts (with certain chemotherapy medicines)  · Scarring of the bladder (rare)  · General infection (with BCG) (very rare)   Call your healthcare provider right away if you develop a fever of 100.4°F (38°C) or higher. Be sure you know what other problems you should watch for, and know how to get help any time, including after office hours, on weekends, and on holidays.    Date Last Reviewed: 2/27/2016 © 2000-2017 [employer]. [address] [place] [address], [place] [address]. All rights reserved. This information is not intended as a substitute for professional medical care. Always follow your healthcare professional's instructions.        Bacillus Calmette-Usha Live, BCG intravesical solution  What is this medicine?  BACILLUS CALMETTE-USHA LIVE, BCG (ba LINDA us ANGELICA met cheney RAYN) is a bacteria solution. This medicine stimulates the immune system to dhillon off cancer cells. It is used to treat bladder cancer.  How should I use this medicine?  This drug is given as a catheter infusion into the bladder. It is administered in a hospital or clinic by a specially trained health care professional. You will be given directions to follow before the treatment. Follow your doctor's directions carefully. Try to hold this medicine in your bladder for 2 hours after treatment.  Talk to your pediatrician regarding the use of this medicine in children. Special care may be needed.  What side effects may I notice from receiving this medicine?  Side effects that you should report to your doctor or health care professional as soon as possible:  · allergic reactions like skin rash, itching or hives, swelling of the face, lips, or tongue  · signs of  infection - fever or chills, cough, sore throat, pain or difficulty passing urine  · signs of decreased red blood cells - unusually weak or tired, fainting spells, lightheadedness  · blood in urine  · breathing problems  · cough  · eye pain, redness  · flu-like symptoms  · joint pain  · bladder-area pain for more than 2 days after treatment  · trouble passing urine or change in the amount of urine  · vomiting  · yellowing of the eyes or skin  Side effects that usually do not require immediate medical attention (report these side effects to your doctor or health care professional if they continue or are bothersome):  · bladder spasm  · burning when passing urine within 2 days of treatment  · feel need to pass urine often or wake up at night to pass urine  · loss of appetite  What may interact with this medicine?  · antibiotics  · medicines to suppress your immune system like chemotherapy agents or corticosteroids  · medicine to treat tuberculosis  What if I miss a dose?  It is important not to miss your dose. Call your doctor or health care professional if you are unable to keep an appointment.  Where should I keep my medicine?  This drug is given in a hospital or clinic and will not be stored at home.  What should I tell my health care provider before I take this medicine?  They need to know if you have any of these conditions:  · aneurysm  · blood in the urine  · bladder biopsy within 2 weeks  · fever or infection  · immune system problems  · leukemia  · lymphoma  · myasthenia gravis  · need organ transplant  · prosthetic device like arterial graft, artificial joint, prosthetic heart valve  · recent or ongoing radiation therapy  · tuberculosis  · an unusual or allergic reaction to Bacillus Calmette-Usha Live, BCG, latex, other medicines, foods, dyes, or preservatives  · pregnant or trying to get pregnant  · breast-feeding  What should I watch for while using this medicine?  Visit your doctor for checks on your  progress. This drug may make you feel generally unwell. Contact your doctor if your symptoms last more than 2 days or if they get worse. Call your doctor right away if you have a severe or unusual symptom.  Infection can be spread to others through contact with this medicine. To prevent the spread of infection follow your doctor's directions carefully after treatment. For the first 6 hours after each treatment, sit down on the toilet to urinate. After urinating, add 2 cups of bleach to the toilet bowl and let set for 15 minutes before flushing. Wash your hands before and after using the restroom.  Drink water or other fluids as directed after treatment with this medicine.  Do not become pregnant while taking this medicine. Women should inform their doctor if they wish to become pregnant or think they might be pregnant. There is a potential for serious side effects to an unborn child. Talk to your health care professional or pharmacist for more information. Do not breast-feed an infant while taking this medicine.  NOTE:This sheet is a summary. It may not cover all possible information. If you have questions about this medicine, talk to your doctor, pharmacist, or health care provider. Copyright© 2017 [employer]

## 2017-12-13 NOTE — H&P
"  Subjective:       Tess Duncan is a 62 y.o. female who is an established patient who was referred by Dr Crawley for evaluation of recurrent UTIs.      She was referred by PCP for recurrent UTIs. She is reported to have 5 UTIs in the last year. She says this is "related to stress and caffeine." She thinks this is related to not drinking enough water. She was last seen in 8/17 by Waqas NP for s/s of UTI - dysuria, urgency, hematuria. She was given Macrobid for this. Unfortunately, no UCx was sent at that time.     She noted gross hematuria significantly in the past. Hematuria with clots lasted for several hours. She notes hesitancy in AM occasionally. Denies UI. Denies RAHUL. She has been seeing Urgent Care for UTI so there is very limited data available for review. She reports getting Macrobid each time for varying lengths of time.     Denies h/o nephrolithiasis, constipation. Nocturia x 1-2.    UCx:  10/17 - negative  5/16 - 10-49k E coli    PVR (bladder scan) initial visit - 17cc    She underwent cystoscopy for recurrent UTI/gross hematuria workup that showed a small papillary lesion in L lateral bladder wall.    She is s/p cysto/BBx/RPG on 11/29/17. 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 Mitomycin was not given (as lesion appeared possibly benign (PUNLMP) and possible associated risks with raul).      The following portions of the patient's history were reviewed and updated as appropriate: allergies, current medications, past family history, past medical history, past social history, past surgical history and problem list.    Review of Systems  Constitutional: no fever or chills  ENT: no nasal congestion or sore throat  Respiratory: no cough or shortness of breath  Cardiovascular: no chest pain or palpitations  Gastrointestinal: no nausea or vomiting, tolerating diet  Genitourinary: as per HPI  Hematologic/Lymphatic: no easy bruising or lymphadenopathy  Musculoskeletal: no arthralgias or myalgias  Skin: no rashes or " lesions  Neurological: no seizures or tremors  Behavioral/Psych: no auditory or visual hallucinations        Objective:    Vitals: BP (!) 117/57 (BP Location: Left arm, Patient Position: Sitting, BP Method: Large (Automatic))   Pulse 89   Ht 5' (1.524 m)   Wt 59.9 kg (132 lb 0.9 oz)   BMI 25.79 kg/m²     Physical Exam   General: well developed, well nourished in no acute distress  Head: normocephalic, atraumatic  Neck: supple, trachea midline, no obvious enlargement of thyroid  HEENT: EOMI, mucus membranes moist, sclera anicteric, no hearing impairment  Lungs: symmetric expansion, non-labored breathing  Cardiovascular: regular rate and rhythm, normal pulses  Abdomen: soft, non tender, non distended, no palpable masses, no hepatosplenomegaly, no hernias, no CVA tenderness  Musculoskeletal: no peripheral edema, normal ROM in bilateral upper and lower extremities  Lymphatics: no cervical or inguinal lymphadenopathy  Skin: no rashes or lesions  Neuro: alert and oriented x 3, no gross deficits  Psych: normal judgment and insight, normal mood/affect and non-anxious  Genitourinary:   patient declined exam      Lab Review   Urine analysis today in clinic shows - 5-10 RBCs     Lab Results   Component Value Date    WBC 6.32 11/24/2017    HGB 14.4 11/24/2017    HCT 44.3 11/24/2017    MCV 94 11/24/2017     11/24/2017     Lab Results   Component Value Date    CREATININE 0.8 11/21/2017    BUN 19 11/21/2017       Imaging  NA       Assessment/Plan:      1. Recurrent UTI    - Discussed UTI prevention strategies.   - Adequate hydration.   - Double voiding. Consider timed voiding.    - Avoid constipation.   - Will assess upper tracts with MRI   - Cystoscopy - bladder tumor   - Cranberry/probiotics    - Estrace cream 2x weekly. Will need to check with PCP regarding contraindication 2/2 liver hemangioma.    - PVR acceptable   - Call with UTI symptoms so UA/UCx can be sent.      2. Gross hematuria   - Discussed etiology and  workup of hematuria   - Likely related to UTI but due to severity would recommend workup   - UCx - UA clear, not indicated today   - Cytology - not indicated   - CT urogram - unable to do 2/2 iodine contrast allergy. Recommend MRI urogram. MR without contrast was done instead.   - Office cystoscopy - bladder tumor    3. 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 Will need to consider immune issues 2/2 adrenal insufficiency. She will be seeing new endo in 2/18.      Follow up in 2 weeks post-op

## 2018-01-03 ENCOUNTER — ANESTHESIA EVENT (OUTPATIENT)
Dept: SURGERY | Facility: OTHER | Age: 63
End: 2018-01-03
Payer: COMMERCIAL

## 2018-01-03 ENCOUNTER — HOSPITAL ENCOUNTER (OUTPATIENT)
Dept: PREADMISSION TESTING | Facility: OTHER | Age: 63
Discharge: HOME | End: 2018-01-03
Attending: UROLOGY
Payer: COMMERCIAL

## 2018-01-03 VITALS
OXYGEN SATURATION: 98 % | HEART RATE: 72 BPM | BODY MASS INDEX: 25.91 KG/M2 | SYSTOLIC BLOOD PRESSURE: 124 MMHG | WEIGHT: 132 LBS | RESPIRATION RATE: 16 BRPM | TEMPERATURE: 98 F | DIASTOLIC BLOOD PRESSURE: 74 MMHG | HEIGHT: 60 IN

## 2018-01-03 RX ORDER — OXYCODONE HYDROCHLORIDE 5 MG/1
5 TABLET ORAL ONCE AS NEEDED
Status: CANCELLED | OUTPATIENT
Start: 2018-01-03 | End: 2018-01-03

## 2018-01-03 RX ORDER — UBIDECARENONE 75 MG
500 CAPSULE ORAL DAILY
COMMUNITY

## 2018-01-03 RX ORDER — FAMOTIDINE 20 MG/1
20 TABLET, FILM COATED ORAL
Status: CANCELLED | OUTPATIENT
Start: 2018-01-03 | End: 2018-01-03

## 2018-01-03 RX ORDER — LIDOCAINE HYDROCHLORIDE 10 MG/ML
0.5 INJECTION, SOLUTION EPIDURAL; INFILTRATION; INTRACAUDAL; PERINEURAL ONCE
Status: CANCELLED | OUTPATIENT
Start: 2018-01-03 | End: 2018-01-03

## 2018-01-03 RX ORDER — SODIUM CHLORIDE, SODIUM LACTATE, POTASSIUM CHLORIDE, CALCIUM CHLORIDE 600; 310; 30; 20 MG/100ML; MG/100ML; MG/100ML; MG/100ML
INJECTION, SOLUTION INTRAVENOUS CONTINUOUS
Status: CANCELLED | OUTPATIENT
Start: 2018-01-03

## 2018-01-03 NOTE — DISCHARGE INSTRUCTIONS
PRE-ADMIT TESTING -  [phone]    [address]  [place] [facility]          Your surgery has been scheduled at [facility]. We are pleased to have the opportunity to serve you. For Further Information please call [phone].    On the day of surgery please report to the Information Desk on the 1st floor.    · CONTACT YOUR PHYSICIAN'S OFFICE THE DAY PRIOR TO YOUR SURGERY TO OBTAIN YOUR ARRIVAL TIME.     · The evening before surgery do not eat anything after 9 p.m. ( this includes hard candy, chewing gum and mints).  You may only have GATORADE, POWERADE AND WATER  from 9 p.m. until you leave your home.   DO NOT DRINK ANY LIQUIDS ON THE WAY TO THE HOSPITAL.      SPECIAL MEDICATION INSTRUCTIONS: TAKE medications checked off by the Anesthesiologist on your Medication List.    Angiogram Patients: Take medications as instructed by your physician, including aspirin.     Surgery Patients:    If you take ASPIRIN - Your PHYSICIAN/SURGEON will need to inform you IF/OR when you need to stop taking aspirin prior to your surgery.     Do Not take any medications containing IBUPROFEN.  Do Not Wear any make-up or dark nail polish   (especially eye make-up) to surgery. If you come to surgery with makeup on you will be required to remove the makeup or nail polish.    Do not shave your surgical area at least 5 days prior to your surgery. The surgical prep will be performed at the hospital according to Infection Control regulations.    Leave all valuables at home.   Do Not wear any jewelry or watches, including any metal in body piercings.  Contact Lens must be removed before surgery. Either do not wear the contact lens or bring a case and solution for storage.  Please bring a container for eyeglasses or dentures as required.  Bring any paperwork your physician has provided, such as consent forms,  history and physicals, doctor's orders, etc.   Bring comfortable clothes that are loose fitting to wear upon  discharge. Take into consideration the type of surgery being performed.  Maintain your diet as advised per your physician the day prior to surgery.      Adequate rest the night before surgery is advised.   Park in the Parking lot behind the hospital or in the [facility] across the street from the parking lot. Parking is complimentary.  If you will be discharged the same day as your procedure, please arrange for a responsible adult to drive you home or to accompany you if traveling by taxi.   YOU WILL NOT BE PERMITTED TO DRIVE OR TO LEAVE THE HOSPITAL ALONE AFTER SURGERY.   It is strongly recommended that you arrange for someone to remain with you for the first 24 hrs following your surgery.       Thank you for your cooperation.  The Staff of [facility].        Bathing Instructions                                                                 Please shower the evening before and morning of your procedure with    ANTIBACTERIAL SOAP. ( DIAL, etc )  Concentrate on the surgical area   for at least 3 minutes and rinse completely. Dry off as usual.   Do not use any deodorant, powder, body lotions, perfume, after shave or    cologne.

## 2018-01-03 NOTE — ANESTHESIA PREPROCEDURE EVALUATION
01/03/2018  Tess Duncan is a 62 y.o., female.    Anesthesia Evaluation    I have reviewed the Patient Summary Reports.    I have reviewed the Nursing Notes.   I have reviewed the Medications.     Review of Systems  Anesthesia Hx:  No problems with previous Anesthesia  Denies Family Hx of Anesthesia complications.   Denies Personal Hx of Anesthesia complications.   Hematology/Oncology:  Hematology Normal   Oncology Normal     EENT/Dental:EENT/Dental Normal   Cardiovascular:   Exercise tolerance: good hyperlipidemia    Pulmonary:  Pulmonary Normal    Renal/:   Chronic Renal Disease Hematuria. Renal cyst.   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 Cushing's disease   Dermatological:  Skin Normal    Psych:  Psychiatric Normal           Physical Exam  General:  Well nourished    Airway/Jaw/Neck:  Airway Findings: Mouth Opening: Normal Tongue: Normal  General Airway Assessment: Adult  Mallampati: II  TM Distance: Normal, at least 6 cm      Dental:  Dental Findings: In tact, molar caps, upper front caps        Mental Status:  Mental Status Findings:  Alert and Oriented, Cooperative         Anesthesia Plan  Type of Anesthesia, risks & benefits discussed:  Anesthesia Type:  general, MAC  Patient's Preference:   Intra-op Monitoring Plan: standard ASA monitors  Intra-op Monitoring Plan Comments:   Post Op Pain Control Plan:   Post Op Pain Control Plan Comments:   Induction:   IV  Beta Blocker:         Informed Consent: Patient understands risks and agrees with Anesthesia plan.  Questions answered. Anesthesia consent signed with patient.  ASA Score: 3     Day of Surgery Review of History & Physical:    H&P update referred to the surgeon.     Anesthesia Plan  Notes: GA vs MAC.  Pt may need steroid due to Kranthi's.  Will double her prednisone on am of surgery.        Ready For Surgery From Anesthesia Perspective.

## 2018-01-10 ENCOUNTER — ANESTHESIA (OUTPATIENT)
Dept: SURGERY | Facility: OTHER | Age: 63
End: 2018-01-10
Payer: COMMERCIAL

## 2018-01-10 ENCOUNTER — HOSPITAL ENCOUNTER (OUTPATIENT)
Facility: OTHER | Age: 63
Discharge: HOME | End: 2018-01-10
Attending: UROLOGY | Admitting: UROLOGY
Payer: COMMERCIAL

## 2018-01-10 VITALS
WEIGHT: 132 LBS | OXYGEN SATURATION: 97 % | SYSTOLIC BLOOD PRESSURE: 102 MMHG | HEART RATE: 81 BPM | RESPIRATION RATE: 18 BRPM | HEIGHT: 60 IN | BODY MASS INDEX: 25.91 KG/M2 | DIASTOLIC BLOOD PRESSURE: 64 MMHG | TEMPERATURE: 98 F

## 2018-01-10 DIAGNOSIS — C67.2 MALIGNANT NEOPLASM OF LATERAL WALL OF BLADDER: Primary | ICD-10-CM

## 2018-01-10 PROCEDURE — 36000707: Performed by: UROLOGY

## 2018-01-10 PROCEDURE — 37000009 HC ANESTHESIA EA ADD 15 MINS: Performed by: UROLOGY

## 2018-01-10 PROCEDURE — 36000706: Performed by: UROLOGY

## 2018-01-10 PROCEDURE — 25000003 PHARM REV CODE 250: Performed by: SPECIALIST

## 2018-01-10 PROCEDURE — 63600175 PHARM REV CODE 636 W HCPCS: Performed by: UROLOGY

## 2018-01-10 PROCEDURE — 88305 TISSUE EXAM BY PATHOLOGIST: CPT | Performed by: PATHOLOGY

## 2018-01-10 PROCEDURE — 52224 CYSTOSCOPY AND TREATMENT: CPT | Mod: ,,, | Performed by: UROLOGY

## 2018-01-10 PROCEDURE — 63600175 PHARM REV CODE 636 W HCPCS: Performed by: NURSE ANESTHETIST, CERTIFIED REGISTERED

## 2018-01-10 PROCEDURE — 71000016 HC POSTOP RECOV ADDL HR: Performed by: UROLOGY

## 2018-01-10 PROCEDURE — 71000015 HC POSTOP RECOV 1ST HR: Performed by: UROLOGY

## 2018-01-10 PROCEDURE — 37000008 HC ANESTHESIA 1ST 15 MINUTES: Performed by: UROLOGY

## 2018-01-10 PROCEDURE — 51798 US URINE CAPACITY MEASURE: CPT | Performed by: UROLOGY

## 2018-01-10 PROCEDURE — 88305 TISSUE EXAM BY PATHOLOGIST: CPT | Mod: 26,,, | Performed by: PATHOLOGY

## 2018-01-10 RX ORDER — FENTANYL CITRATE 50 UG/ML
INJECTION, SOLUTION INTRAMUSCULAR; INTRAVENOUS
Status: DISCONTINUED | OUTPATIENT
Start: 2018-01-10 | End: 2018-01-10

## 2018-01-10 RX ORDER — ACETAMINOPHEN 325 MG/1
650 TABLET ORAL EVERY 4 HOURS PRN
Status: DISCONTINUED | OUTPATIENT
Start: 2018-01-10 | End: 2018-01-10 | Stop reason: HOSPADM

## 2018-01-10 RX ORDER — ONDANSETRON 2 MG/ML
4 INJECTION INTRAMUSCULAR; INTRAVENOUS DAILY PRN
Status: DISCONTINUED | OUTPATIENT
Start: 2018-01-10 | End: 2018-01-10 | Stop reason: HOSPADM

## 2018-01-10 RX ORDER — LIDOCAINE HCL/PF 100 MG/5ML
SYRINGE (ML) INTRAVENOUS
Status: DISCONTINUED | OUTPATIENT
Start: 2018-01-10 | End: 2018-01-10

## 2018-01-10 RX ORDER — MEPERIDINE HYDROCHLORIDE 50 MG/ML
12.5 INJECTION INTRAMUSCULAR; INTRAVENOUS; SUBCUTANEOUS ONCE AS NEEDED
Status: DISCONTINUED | OUTPATIENT
Start: 2018-01-10 | End: 2018-01-10 | Stop reason: HOSPADM

## 2018-01-10 RX ORDER — PHENAZOPYRIDINE HYDROCHLORIDE 100 MG/1
200 TABLET, FILM COATED ORAL
Qty: 18 TABLET | Refills: 0 | Status: SHIPPED | OUTPATIENT
Start: 2018-01-10 | End: 2021-02-17

## 2018-01-10 RX ORDER — PROPOFOL 10 MG/ML
VIAL (ML) INTRAVENOUS CONTINUOUS PRN
Status: DISCONTINUED | OUTPATIENT
Start: 2018-01-10 | End: 2018-01-10

## 2018-01-10 RX ORDER — MIDAZOLAM HYDROCHLORIDE 1 MG/ML
INJECTION INTRAMUSCULAR; INTRAVENOUS
Status: DISCONTINUED | OUTPATIENT
Start: 2018-01-10 | End: 2018-01-10

## 2018-01-10 RX ORDER — FAMOTIDINE 20 MG/1
20 TABLET, FILM COATED ORAL
Status: COMPLETED | OUTPATIENT
Start: 2018-01-10 | End: 2018-01-10

## 2018-01-10 RX ORDER — FENTANYL CITRATE 50 UG/ML
25 INJECTION, SOLUTION INTRAMUSCULAR; INTRAVENOUS EVERY 5 MIN PRN
Status: DISCONTINUED | OUTPATIENT
Start: 2018-01-10 | End: 2018-01-10 | Stop reason: HOSPADM

## 2018-01-10 RX ORDER — OXYCODONE HYDROCHLORIDE 5 MG/1
5 TABLET ORAL ONCE AS NEEDED
Status: DISCONTINUED | OUTPATIENT
Start: 2018-01-10 | End: 2018-01-10 | Stop reason: HOSPADM

## 2018-01-10 RX ORDER — HYDROCODONE BITARTRATE AND ACETAMINOPHEN 5; 325 MG/1; MG/1
1 TABLET ORAL EVERY 6 HOURS PRN
Qty: 10 TABLET | Refills: 0 | Status: SHIPPED | OUTPATIENT
Start: 2018-01-10 | End: 2018-01-24

## 2018-01-10 RX ORDER — SULFAMETHOXAZOLE AND TRIMETHOPRIM 800; 160 MG/1; MG/1
1 TABLET ORAL 2 TIMES DAILY
Qty: 4 TABLET | Refills: 0 | Status: SHIPPED | OUTPATIENT
Start: 2018-01-10 | End: 2018-01-13

## 2018-01-10 RX ORDER — HYDROCODONE BITARTRATE AND ACETAMINOPHEN 5; 325 MG/1; MG/1
1 TABLET ORAL EVERY 4 HOURS PRN
Status: DISCONTINUED | OUTPATIENT
Start: 2018-01-10 | End: 2018-01-10 | Stop reason: HOSPADM

## 2018-01-10 RX ORDER — HYDROMORPHONE HYDROCHLORIDE 2 MG/ML
0.5 INJECTION, SOLUTION INTRAMUSCULAR; INTRAVENOUS; SUBCUTANEOUS EVERY 4 HOURS PRN
Status: DISCONTINUED | OUTPATIENT
Start: 2018-01-10 | End: 2018-01-10 | Stop reason: HOSPADM

## 2018-01-10 RX ORDER — CIPROFLOXACIN 2 MG/ML
400 INJECTION, SOLUTION INTRAVENOUS
Status: COMPLETED | OUTPATIENT
Start: 2018-01-10 | End: 2018-01-10

## 2018-01-10 RX ORDER — ONDANSETRON 2 MG/ML
4 INJECTION INTRAMUSCULAR; INTRAVENOUS EVERY 12 HOURS PRN
Status: DISCONTINUED | OUTPATIENT
Start: 2018-01-10 | End: 2018-01-10 | Stop reason: HOSPADM

## 2018-01-10 RX ORDER — PROPOFOL 10 MG/ML
VIAL (ML) INTRAVENOUS
Status: DISCONTINUED | OUTPATIENT
Start: 2018-01-10 | End: 2018-01-10

## 2018-01-10 RX ORDER — SODIUM CHLORIDE 0.9 % (FLUSH) 0.9 %
3 SYRINGE (ML) INJECTION
Status: DISCONTINUED | OUTPATIENT
Start: 2018-01-10 | End: 2018-01-10 | Stop reason: HOSPADM

## 2018-01-10 RX ORDER — PHENYLEPHRINE HYDROCHLORIDE 10 MG/ML
INJECTION INTRAVENOUS
Status: DISCONTINUED | OUTPATIENT
Start: 2018-01-10 | End: 2018-01-10

## 2018-01-10 RX ORDER — SODIUM CHLORIDE, SODIUM LACTATE, POTASSIUM CHLORIDE, CALCIUM CHLORIDE 600; 310; 30; 20 MG/100ML; MG/100ML; MG/100ML; MG/100ML
INJECTION, SOLUTION INTRAVENOUS CONTINUOUS
Status: DISCONTINUED | OUTPATIENT
Start: 2018-01-10 | End: 2018-01-10 | Stop reason: HOSPADM

## 2018-01-10 RX ORDER — LIDOCAINE HYDROCHLORIDE 10 MG/ML
0.5 INJECTION, SOLUTION EPIDURAL; INFILTRATION; INTRACAUDAL; PERINEURAL ONCE
Status: DISCONTINUED | OUTPATIENT
Start: 2018-01-10 | End: 2018-01-10 | Stop reason: HOSPADM

## 2018-01-10 RX ORDER — OXYCODONE HYDROCHLORIDE 5 MG/1
5 TABLET ORAL
Status: DISCONTINUED | OUTPATIENT
Start: 2018-01-10 | End: 2018-01-10 | Stop reason: HOSPADM

## 2018-01-10 RX ORDER — HYDROMORPHONE HYDROCHLORIDE 2 MG/ML
0.4 INJECTION, SOLUTION INTRAMUSCULAR; INTRAVENOUS; SUBCUTANEOUS EVERY 5 MIN PRN
Status: DISCONTINUED | OUTPATIENT
Start: 2018-01-10 | End: 2018-01-10 | Stop reason: HOSPADM

## 2018-01-10 RX ADMIN — PROPOFOL 75 MCG/KG/MIN: 10 INJECTION, EMULSION INTRAVENOUS at 08:01

## 2018-01-10 RX ADMIN — PHENYLEPHRINE HYDROCHLORIDE 200 MCG: 10 INJECTION INTRAVENOUS at 09:01

## 2018-01-10 RX ADMIN — PROPOFOL 20 MG: 10 INJECTION, EMULSION INTRAVENOUS at 08:01

## 2018-01-10 RX ADMIN — FENTANYL CITRATE 100 MCG: 50 INJECTION, SOLUTION INTRAMUSCULAR; INTRAVENOUS at 08:01

## 2018-01-10 RX ADMIN — LIDOCAINE HYDROCHLORIDE 50 MG: 20 INJECTION, SOLUTION INTRAVENOUS at 08:01

## 2018-01-10 RX ADMIN — PHENYLEPHRINE HYDROCHLORIDE 200 MCG: 10 INJECTION INTRAVENOUS at 08:01

## 2018-01-10 RX ADMIN — FAMOTIDINE 20 MG: 20 TABLET, FILM COATED ORAL at 07:01

## 2018-01-10 RX ADMIN — CIPROFLOXACIN 400 MG: 2 INJECTION, SOLUTION INTRAVENOUS at 08:01

## 2018-01-10 RX ADMIN — SODIUM CHLORIDE, SODIUM LACTATE, POTASSIUM CHLORIDE, AND CALCIUM CHLORIDE: 600; 310; 30; 20 INJECTION, SOLUTION INTRAVENOUS at 08:01

## 2018-01-10 RX ADMIN — MIDAZOLAM HYDROCHLORIDE 2 MG: 1 INJECTION, SOLUTION INTRAMUSCULAR; INTRAVENOUS at 08:01

## 2018-01-10 NOTE — BRIEF OP NOTE
[facility]  Brief Operative Note     SUMMARY     Surgery Date: 1/10/2018     Surgeon(s) and Role:     * Whitney Gee MD - Primary    Assisting Surgeon: None    Pre-op Diagnosis:  Malignant neoplasm of lateral wall of bladder [C67.2]    Post-op Diagnosis:  Post-Op Diagnosis Codes:     * Malignant neoplasm of lateral wall of bladder [C67.2]    Procedure(s) (LRB):  CYSTOSCOPY W/BLADDER BIOPSY, FULGURATION-BLADDER (N/A)    Anesthesia: Choice    Description of the findings of the procedure: No tumors noted with full cystoscopy. Prior resection site noted in L lateral bladder wall. Base of scar biopsied. Hemostasis obtained.    Findings/Key Components: Small AVM in posterior bladder wall fulgurated.     Estimated Blood Loss: <5cc         Specimens:   Specimen (12h ago through future)    None      Base of bladder tumor    Discharge Note    SUMMARY     Admit Date: 1/10/2018    Discharge Date and Time:  01/10/2018 9:27 AM    Hospital Course (synopsis of major diagnoses, care, treatment, and services provided during the course of the hospital stay): Uncomplicated cysto/BBx     Final Diagnosis: Post-Op Diagnosis Codes:     * Malignant neoplasm of lateral wall of bladder [C67.2]    Disposition: Home or Self Care    Follow Up/Patient Instructions:     Medications:  Reconciled Home Medications:   Current Discharge Medication List      START taking these medications    Details   hydrocodone-acetaminophen 5-325mg (NORCO) 5-325 mg per tablet Take 1 tablet by mouth every 6 (six) hours as needed for Pain.  Qty: 10 tablet, Refills: 0      !! phenazopyridine (PYRIDIUM) 100 MG tablet Take 2 tablets (200 mg total) by mouth 3 (three) times daily with meals.  Qty: 18 tablet, Refills: 0      sulfamethoxazole-trimethoprim 800-160mg (BACTRIM DS) 800-160 mg Tab Take 1 tablet by mouth 2 (two) times daily.  Qty: 4 tablet, Refills: 0       !! - Potential duplicate medications found. Please discuss with provider.       CONTINUE these medications which have NOT CHANGED    Details   alendronate (FOSAMAX) 70 MG tablet 1 tablet on Sunday with a full glass of water and wait 30 minutes for breakfast and pills. Take sitting or standing  Qty: 4 tablet, Refills: 12      calcium carbonate (CORAL CALCIUM) 390 mg  (1,000 mg) Tab Take by mouth.      cyanocobalamin (VITAMIN B-12) 500 MCG tablet Take 500 mcg by mouth once daily.      fludrocortisone (FLORINEF) 0.1 mg Tab Take 1 tablet (100 mcg total) by mouth once daily.  Qty: 30 tablet, Refills: 11      levothyroxine (SYNTHROID) 88 MCG tablet Take 1 tablet (88 mcg total) by mouth once daily.  Qty: 30 tablet, Refills: 11      prasterone, dhea, (DHEA) 25 mg Cap Take 20 mg by mouth.      predniSONE (DELTASONE) 5 MG tablet TAKE 1 TABLET BY MOUTH EVERY MORNING AND 1/2 TABLET EVERY EVENING  Qty: 48 tablet, Refills: 6      !! UNABLE TO FIND Take by mouth once daily.  MG       !! UNABLE TO FIND Take by mouth once daily. TUMERIC       vitamin D 1000 units Tab Take 185 mg by mouth once daily.      !! phenazopyridine (PYRIDIUM) 100 MG tablet Take 2 tablets (200 mg total) by mouth 3 (three) times daily as needed for Pain.  Qty: 18 tablet, Refills: 0       !! - Potential duplicate medications found. Please discuss with provider.          Discharge Procedure Orders  Diet general     Activity as tolerated     Call MD for:  temperature >100.4     Call MD for:  persistent nausea and vomiting     Call MD for:  severe uncontrolled pain     Call MD for:  difficulty breathing, headache or visual disturbances     No dressing needed       Follow-up Information     Whitney Gee MD In 2 weeks.    Specialty:  Urology  Why:  For post-op follow up  Contact information:  [address] [place], [place] [id number] [phone] #[id number]

## 2018-01-10 NOTE — INTERVAL H&P NOTE
The patient has been examined and the H&P has been reviewed:    I concur with the findings and no changes have occurred since H&P was written.    Anesthesia/Surgery risks, benefits and alternative options discussed and understood by patient/family.          Active Hospital Problems    Diagnosis  POA    Malignant neoplasm of lateral wall of bladder [C67.2]  Yes      Resolved Hospital Problems    Diagnosis Date Resolved POA   No resolved problems to display.

## 2018-01-10 NOTE — ANESTHESIA POSTPROCEDURE EVALUATION
Anesthesia Post Evaluation    Patient: Tess Duncan    Procedure(s) Performed: Procedure(s) (LRB):  CYSTOSCOPY W/BLADDER BIOPSY,POSSIBLE FULGURATION-BLADDER (N/A)    Final Anesthesia Type: general  Patient location during evaluation: [facility]  Patient participation: Yes- Able to Participate  Level of consciousness: awake and alert  Post-procedure vital signs: reviewed and stable  Pain management: adequate  Airway patency: patent  PONV status at discharge: No PONV  Anesthetic complications: no      Cardiovascular status: hemodynamically stable  Respiratory status: unassisted, spontaneous ventilation and room air  Hydration status: euvolemic  Follow-up not needed.        Visit Vitals  /65 (BP Location: Left arm, Patient Position: Sitting)   Pulse 82   Temp 37.2 °C (98.9 °F) (Oral)   Resp 18   Ht 5' (1.524 m)   Wt 59.9 kg (132 lb)   SpO2 97%   BMI 25.78 kg/m²       Pain/Teagan Score: Pain Assessment Performed: Yes (1/10/2018  7:10 AM)  Presence of Pain: denies (1/10/2018  7:10 AM)

## 2018-01-10 NOTE — OR NURSING
Voided 75 cc of blood tinged urine. Notified Dr Gee in the or. Stated to scan bladder and if <100cc may dc home.

## 2018-01-10 NOTE — DISCHARGE INSTRUCTIONS
Anesthesia: Monitored Anesthesia Care (MAC)    Anesthesia Safety  · Have an adult family member or friend drive you home after the procedure.  · For the first 24 hours after your surgery:  ¨ Do not drive or use heavy equipment.  ¨ Do not make important decisions or sign documents.  ¨ Avoid alcohol.  ¨ Have someone stay with you, if possible. They can watch for problems and help keep you safe.          Transurethral Bladder Biopsy    Transurethral bladder biopsy is done to help find the cause of a bladder problem, such as bladder cancer. During the procedure, small tissue samples are taken from the inside of your bladder. The samples are then tested in a lab. This sheet explains how the procedure is done.  Preparing for the procedure  Prepare as you have been told. In addition:  · Tell your healthcare provider about all medicines you take. This includes herbs and other supplements. It also includes any blood thinners, such as warfarin, clopidogrel, or daily aspirin. You may need to stop taking some or all of them before surgery. Your healthcare provider may tell you to stop aspirin several days before the procedure.  · Your healthcare providers may tell you not to eat or drink during the hours before your procedure. (If you have been instructed to take medicines, take them with a small sip of water.)  The day of the procedure  The procedure takes about 30 to 45 minutes. Youll likely go home the same day.  Before the procedure begins  What to expect before the procedure:  · An IV line is put into a vein in your arm or hand. This line supplies fluids and medicines (such as antibiotics).  · To keep you free of pain during the procedure, you might be given general anesthesia. This medicine puts you into a state like deep sleep. Or you may be given spinal anesthesia. This medicine numbs only your body from the waist down. In some cases, local anesthesia is used. This medicine numbs only the area being treated.  During  the procedure  What to expect during the procedure:  · A special tool called a cystoscope (scope) is used. The scope is a thin, lighted tube with a tiny lens on the end to see inside the bladder. The healthcare provider inserts this through the urethra into the bladder.  · Water is sent through the scope to fill the bladder. This stretches the bladder to give the healthcare provider a better view.  · A small surgical tool is passed through the scope into the bladder. Samples of tissue are then removed from the bladder. An electric tool may be used to stop any bleeding.  · When the procedure is complete, all tools are removed and the bladder is drained.  · A thin tube (Dickerson catheter) may be placed in your bladder to drain urine while the bladder heals.  After the procedure  Youll be taken to a room to rest until the anesthesia wears off. If a breathing tube was used, your throat might be sore at first. You might be given medicines to manage pain and prevent infection. After a few hours, youll be released to go home. Have an adult family member or friend ready to drive you.  Recovering at home  Once youre home, be as active as you comfortably can. Get up and walk around, but avoid exercise or heavy activities until you feel better. You can likely return to your normal routine in 1 to 2 days. If you go home with a catheter, care for it as directed. And follow any special instructions your healthcare provider gave you.  Call the healthcare provider  Contact your healthcare provider if you have any of the following:  · Chest pain or trouble breathing (call 911 or other emergency service)   · A fever of 100.4°F (38°C) or higher   · Pain thats not controlled with medicine  · Trouble urinating or inability to urinate  · Bloody urine for more than 48 hours   Follow-up  Youll have a follow-up visit with your healthcare provider in about 7 days. During this visit, your healthcare provider will discuss the results of your  biopsy. You and your healthcare provider will also discuss any treatments that might be needed.  Risks and possible complications   These include:  · Pain or burning when urinating for a day or so after the procedure  · Bleeding or blood in the urine  · Infection  · Damage to the bladder wall (may require temporary catheter drainage or further repair)  · Narrowing of the urethra  · Risks of anesthesia (the anesthesiologist will discuss these with you)   Date Last Reviewed: 2/7/2016  © 0411-5924 [employer]. [address] [place] [address]. All rights reserved. This information is not intended as a substitute for professional medical care. Always follow your healthcare professional's instructions.      Follow any other instructions given per Dr Gee!!!!

## 2018-01-11 NOTE — OP NOTE
DATE OF PROCEDURE:  01/10/2018    SURGEON:  Whitney Gee M.D.    PREOPERATIVE DIAGNOSIS:  Malignant neoplasm of bladder.    POSTOPERATIVE DIAGNOSIS:  Malignant neoplasm of bladder.    PROCEDURE PERFORMED:  Cystoscopy with bladder biopsy and fulguration.    INDICATIONS FOR PROCEDURE:  Ms. Duncan is a 62-year-old female who initially   presented with gross hematuria and recurrent urinary tract infections.  She   underwent office cystoscopy, which showed a small papillary lesion on the left   lateral bladder wall.  She underwent previous resection of this that showed   high-grade Ta lesion.  She presents today for repeat resection of the tumor bed.    DESCRIPTION OF PROCEDURE:  Ms. Duncan was brought to the Operating Room and   placed under monitored anesthesia care.  Full timeout of the procedures were   performed identifying the correct patient and procedure.  Appropriate IV   antibiotics with ciprofloxacin was given prior to commencement of surgery.  The   patient was placed in the dorsal lithotomy position and prepped and draped in   the usual sterile fashion.    A 22-Citizen of [place] rigid cystoscope was passed per urethra into the bladder.  Full   cystoscopy was performed, which showed no abnormalities throughout the entirety   of the bladder.  There was a small AVM noted in the posterior bladder wall and   previous resection site noted in the left lateral bladder wall consistent with   her previous bladder tumor removal.  Ureteral orifices were identified and were   noted to be normal in appearance.    Flexible biopsy forceps were used to biopsy the base of the previous resection   site in the left lateral bladder wall.  Care was taken to avoid the area of the   left ureteral orifice.  Three small biopsies were taken from the tumor bed and   sent as base of bladder tumor.  Bugbee cautery was then used to fulgurate the   area for hemostasis.  The small AVM that was noted in the posterior bladder wall   was also  cauterized.  Again, this AVM was noted to be approximately 1 to 2 mm   in size.    Bladder was then drained and the cystoscope was replaced back with the bladder   refilled.  There was one tiny area of bleeding noted, which was cauterized for   hemostasis.  Hemostasis was then confirmed after refilling of the bladder.    Bladder was then drained in a final time and the cystoscope was removed.    COMPLICATIONS:  None.    FINDINGS:  No tumors noted with full cystoscopy.  Prior resection site noted on   the left lateral bladder wall and the base of the scar was biopsied.  Hemostasis   obtained.    ESTIMATED BLOOD LOSS:  Less than 5 mL    SPECIMENS:  Based the bladder tumor.    DRAINS:  None.    PATIENT DISPOSITION:  The patient is stable and deemed appropriate for discharge   home after postoperative recovery.  She will follow up in approximately 2 weeks   for review of her pathology.      LOCO/COOPER  dd: 01/10/2018 09:32:01 (CST)  td: 01/10/2018 10:00:13 (CST)  Doc ID   #[id number]  Job ID #[id number]    CC:

## 2018-01-16 ENCOUNTER — TELEPHONE (OUTPATIENT)
Dept: NEUROSURGERY | Facility: CLINIC | Age: 63
End: 2018-01-16

## 2018-01-16 DIAGNOSIS — I67.1 CEREBRAL ANEURYSM: Primary | ICD-10-CM

## 2018-01-16 NOTE — TELEPHONE ENCOUNTER
----- Message from Marylu Bj sent at 1/16/2018 10:17 AM CST -----  Contact: Self- [phone]  Waqas- pt called to speak with EJ- would like to discuss scheduling something- didn't give any further details- please call pt back at [phone]

## 2018-01-17 ENCOUNTER — TELEPHONE (OUTPATIENT)
Dept: UROLOGY | Facility: CLINIC | Age: 63
End: 2018-01-17

## 2018-01-24 ENCOUNTER — OFFICE VISIT (OUTPATIENT)
Dept: UROLOGY | Facility: CLINIC | Age: 63
End: 2018-01-24
Attending: UROLOGY
Payer: COMMERCIAL

## 2018-01-24 VITALS
HEIGHT: 60 IN | WEIGHT: 132 LBS | BODY MASS INDEX: 25.91 KG/M2 | SYSTOLIC BLOOD PRESSURE: 109 MMHG | DIASTOLIC BLOOD PRESSURE: 70 MMHG | HEART RATE: 79 BPM

## 2018-01-24 DIAGNOSIS — N39.0 RECURRENT UTI: Primary | ICD-10-CM

## 2018-01-24 DIAGNOSIS — C67.2 MALIGNANT NEOPLASM OF LATERAL WALL OF URINARY BLADDER: ICD-10-CM

## 2018-01-24 DIAGNOSIS — R31.0 HEMATURIA, GROSS: ICD-10-CM

## 2018-01-24 PROBLEM — D49.4 BLADDER TUMOR: Status: RESOLVED | Noted: 2017-11-29 | Resolved: 2018-01-24

## 2018-01-24 PROCEDURE — 99214 OFFICE O/P EST MOD 30 MIN: CPT | Mod: S$GLB,,, | Performed by: UROLOGY

## 2018-01-24 PROCEDURE — 87086 URINE CULTURE/COLONY COUNT: CPT

## 2018-01-24 NOTE — PROGRESS NOTES
"  Subjective:       Tess Duncan is a 63 y.o. female who is an established patient who was referred by Dr Crawley for evaluation of recurrent UTIs/bladder cancer.      She was referred by PCP for recurrent UTIs. She is reported to have 5 UTIs in the last year. She says this is "related to stress and caffeine." She thinks this is related to not drinking enough water. She was last seen in 8/17 by Waqas NP for s/s of UTI - dysuria, urgency, hematuria. She was given Macrobid for this. Unfortunately, no UCx was sent at that time.     She noted gross hematuria significantly in the past. Hematuria with clots lasted for several hours. She notes hesitancy in AM occasionally. Denies UI. Denies RAHUL. She has been seeing Urgent Care for UTI so there is very limited data available for review. She reports getting Macrobid each time for varying lengths of time.     Denies h/o nephrolithiasis, constipation. Nocturia x 1-2.    UCx:  10/17 - negative  5/16 - 10-49k E coli    PVR (bladder scan) initial visit - 17    She underwent cystoscopy for recurrent UTI/gross hematuria workup that showed a small papillary lesion in L lateral bladder wall.    She is s/p cysto/BBx/RPG on 11/29/17. 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 Mitomycin was not given (as lesion appeared possibly benign (PUNLMP) and possible associated risks with raul). Re-cysto/BBx 1/10/18 was negative for malignancy.       The following portions of the patient's history were reviewed and updated as appropriate: allergies, current medications, past family history, past medical history, past social history, past surgical history and problem list.    Review of Systems  Constitutional: no fever or chills  ENT: no nasal congestion or sore throat  Respiratory: no cough or shortness of breath  Cardiovascular: no chest pain or palpitations  Gastrointestinal: no nausea or vomiting, tolerating diet  Genitourinary: as per HPI  Hematologic/Lymphatic: no easy bruising or " lymphadenopathy  Musculoskeletal: no arthralgias or myalgias  Skin: no rashes or lesions  Neurological: no seizures or tremors  Behavioral/Psych: no auditory or visual hallucinations        Objective:    Vitals: /70 (BP Location: Left arm, Patient Position: Sitting, BP Method: Large (Automatic))   Pulse 79   Ht 5' (1.524 m)   Wt 59.9 kg (132 lb)   BMI 25.78 kg/m²     Physical Exam   General: well developed, well nourished in no acute distress  Head: normocephalic, atraumatic  Neck: supple, trachea midline, no obvious enlargement of thyroid  HEENT: EOMI, mucus membranes moist, sclera anicteric, no hearing impairment  Lungs: symmetric expansion, non-labored breathing  Cardiovascular: regular rate and rhythm, normal pulses  Abdomen: soft, non tender, non distended, no palpable masses, no hepatosplenomegaly, no hernias, no CVA tenderness  Musculoskeletal: no peripheral edema, normal ROM in bilateral upper and lower extremities  Lymphatics: no cervical or inguinal lymphadenopathy  Skin: no rashes or lesions  Neuro: alert and oriented x 3, no gross deficits  Psych: normal judgment and insight, normal mood/affect and non-anxious  Genitourinary:   patient declined exam      Lab Review   Urine analysis today in clinic shows - 5-10 RBCs     Lab Results   Component Value Date    WBC 6.32 11/24/2017    HGB 14.4 11/24/2017    HCT 44.3 11/24/2017    MCV 94 11/24/2017     11/24/2017     Lab Results   Component Value Date    CREATININE 0.8 11/21/2017    BUN 19 11/21/2017       Imaging  NA       Assessment/Plan:      1. Recurrent UTI    - Discussed UTI prevention strategies.   - Adequate hydration.   - Double voiding. Consider timed voiding.    - Avoid constipation.   - MRI done (without contrast)   - Cystoscopy - bladder tumor   - Cranberry/probiotics    - Estrace cream 2x weekly. Will need to check with PCP regarding contraindication 2/2 liver hemangioma.    - PVR acceptable   - Call with UTI symptoms so UA/UCx  can be sent.    - UCx today     2. Gross hematuria   - Discussed etiology and workup of hematuria   - Likely related to UTI but due to severity would recommend workup   - UCx - UA clear, not indicated today   - Cytology - not indicated   - CT urogram - unable to do 2/2 iodine contrast allergy. Recommend MRI urogram. MR without contrast was done instead.   - Office cystoscopy - bladder tumor    3. 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 Will need to consider immune issues 2/2 adrenal insufficiency. She will be seeing new endo in 2/18.   - Discussed pros and cons of BCG. Will proceed with BCG starting in March.   - Repeat cystoscopy in 3 months.    - Annual upper tract surveillance with MR urogram (due 11/18)      Follow up in 3 months for cystoscopy

## 2018-01-26 ENCOUNTER — TELEPHONE (OUTPATIENT)
Dept: UROLOGY | Facility: HOSPITAL | Age: 63
End: 2018-01-26

## 2018-01-26 LAB — BACTERIA UR CULT: NO GROWTH

## 2018-01-29 ENCOUNTER — HOSPITAL ENCOUNTER (OUTPATIENT)
Dept: RADIOLOGY | Facility: HOSPITAL | Age: 63
Discharge: HOME | End: 2018-01-29
Attending: NEUROLOGICAL SURGERY
Payer: COMMERCIAL

## 2018-01-29 ENCOUNTER — OFFICE VISIT (OUTPATIENT)
Dept: NEUROSURGERY | Facility: CLINIC | Age: 63
End: 2018-01-29
Payer: COMMERCIAL

## 2018-01-29 VITALS
BODY MASS INDEX: 24.92 KG/M2 | DIASTOLIC BLOOD PRESSURE: 75 MMHG | HEART RATE: 103 BPM | TEMPERATURE: 100 F | WEIGHT: 132 LBS | HEIGHT: 61 IN | SYSTOLIC BLOOD PRESSURE: 112 MMHG

## 2018-01-29 DIAGNOSIS — E24.0 CUSHING'S DISEASE: ICD-10-CM

## 2018-01-29 DIAGNOSIS — I67.1 CEREBRAL ANEURYSM: Primary | ICD-10-CM

## 2018-01-29 DIAGNOSIS — I67.1 CEREBRAL ANEURYSM: ICD-10-CM

## 2018-01-29 LAB
CREAT SERPL-MCNC: 0.7 MG/DL (ref 0.5–1.4)
SAMPLE: NORMAL

## 2018-01-29 PROCEDURE — A9585 GADOBUTROL INJECTION: HCPCS | Performed by: NEUROLOGICAL SURGERY

## 2018-01-29 PROCEDURE — 25500020 PHARM REV CODE 255: Performed by: NEUROLOGICAL SURGERY

## 2018-01-29 PROCEDURE — 70553 MRI BRAIN STEM W/O & W/DYE: CPT | Mod: TC

## 2018-01-29 PROCEDURE — 99213 OFFICE O/P EST LOW 20 MIN: CPT | Mod: S$GLB,,, | Performed by: NEUROLOGICAL SURGERY

## 2018-01-29 PROCEDURE — 99999 PR PBB SHADOW E&M-EST. PATIENT-LVL III: CPT | Mod: PBBFAC,,, | Performed by: NEUROLOGICAL SURGERY

## 2018-01-29 PROCEDURE — 70553 MRI BRAIN STEM W/O & W/DYE: CPT | Mod: 26,,, | Performed by: RADIOLOGY

## 2018-01-29 RX ORDER — GADOBUTROL 604.72 MG/ML
6 INJECTION INTRAVENOUS
Status: COMPLETED | OUTPATIENT
Start: 2018-01-29 | End: 2018-01-29

## 2018-01-29 RX ADMIN — GADOBUTROL 6 ML: 604.72 INJECTION INTRAVENOUS at 12:01

## 2018-01-29 NOTE — PROGRESS NOTES
This office note has been dictated.  Tess Duncan was seen in neurosurgical followup at the office today.  She has   had no new neurological issues over the last few months, but was found to have a   bladder tumor.  Plans are to have her take BCG therapy to treat this tumor.    She has had no new headaches or other neurological symptoms.    On examination, she is alert and speaking clearly.  She shows no new   neurological deficit.  She had no difficulty arising from a chair and walking   and seems generally neurologically intact.    MRI of the brain with contrast was done at [facility] this morning.  It is   noted that the one elevated creatinine that was obtained seems to have been an   error as all creatinines and GFR including today are normal.  Again, is seen the   lesion adjacent to the left middle cerebral artery.  This shows a fair amount   of blooming artifact on SWI on 3D contrast studies.  This shows no definite   filling of contrast.  There is a branch that runs behind it.  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    I do not believe this requires any specific treatment.  She can follow up with   Endocrinology for management of her Cushing disease and hypopituitarism.  I will   of course be happy to see her back if any new problems arise.      DRISS/COOPER  dd: 01/29/2018 14:07:27 (CST)  td: 01/30/2018 10:34:25 (CST)  Doc ID   #[id number]  Job ID #[id number]    CC: Tess Duncan

## 2018-02-09 DIAGNOSIS — C67.9 MALIGNANT NEOPLASM OF URINARY BLADDER, UNSPECIFIED SITE: Primary | ICD-10-CM

## 2018-02-19 ENCOUNTER — OFFICE VISIT (OUTPATIENT)
Dept: ENDOCRINOLOGY | Facility: CLINIC | Age: 63
End: 2018-02-19
Payer: COMMERCIAL

## 2018-02-19 VITALS
BODY MASS INDEX: 26.55 KG/M2 | HEIGHT: 60 IN | DIASTOLIC BLOOD PRESSURE: 78 MMHG | WEIGHT: 135.25 LBS | SYSTOLIC BLOOD PRESSURE: 120 MMHG | HEART RATE: 84 BPM

## 2018-02-19 DIAGNOSIS — E27.1 ADRENAL INSUFFICIENCY (ADDISON'S DISEASE): Primary | ICD-10-CM

## 2018-02-19 DIAGNOSIS — M81.8 OSTEOPOROSIS, IDIOPATHIC: ICD-10-CM

## 2018-02-19 DIAGNOSIS — E89.0 HYPOTHYROIDISM, POSTSURGICAL: ICD-10-CM

## 2018-02-19 DIAGNOSIS — E23.0 PANHYPOPITUITARISM: ICD-10-CM

## 2018-02-19 PROCEDURE — 3008F BODY MASS INDEX DOCD: CPT | Mod: S$GLB,,, | Performed by: INTERNAL MEDICINE

## 2018-02-19 PROCEDURE — 99214 OFFICE O/P EST MOD 30 MIN: CPT | Mod: S$GLB,,, | Performed by: INTERNAL MEDICINE

## 2018-02-19 PROCEDURE — 99999 PR PBB SHADOW E&M-EST. PATIENT-LVL IV: CPT | Mod: PBBFAC,,, | Performed by: INTERNAL MEDICINE

## 2018-02-19 RX ORDER — LEVOTHYROXINE SODIUM 88 UG/1
88 TABLET ORAL DAILY
Qty: 30 TABLET | Refills: 11 | Status: SHIPPED | OUTPATIENT
Start: 2018-02-19 | End: 2019-01-24 | Stop reason: SDUPTHER

## 2018-02-19 RX ORDER — DEXAMETHASONE SODIUM PHOSPHATE 4 MG/ML
INJECTION, SOLUTION INTRA-ARTICULAR; INTRALESIONAL; INTRAMUSCULAR; INTRAVENOUS; SOFT TISSUE
Qty: 1 ML | Refills: 0 | Status: SHIPPED | OUTPATIENT
Start: 2018-02-19 | End: 2018-12-07

## 2018-02-19 RX ORDER — ALENDRONATE SODIUM 70 MG/1
TABLET ORAL
Qty: 4 TABLET | Refills: 12 | Status: SHIPPED | OUTPATIENT
Start: 2018-02-19 | End: 2019-01-24 | Stop reason: SDUPTHER

## 2018-02-19 RX ORDER — PREDNISONE 5 MG/1
TABLET ORAL
Qty: 48 TABLET | Refills: 6 | Status: SHIPPED | OUTPATIENT
Start: 2018-02-19 | End: 2019-01-24 | Stop reason: SDUPTHER

## 2018-02-19 RX ORDER — FLUDROCORTISONE ACETATE 0.1 MG/1
100 TABLET ORAL DAILY
Qty: 30 TABLET | Refills: 11 | Status: SHIPPED | OUTPATIENT
Start: 2018-02-19 | End: 2019-01-24 | Stop reason: SDUPTHER

## 2018-02-19 NOTE — PROGRESS NOTES
Subjective:      Patient ID: Tess Duncan is a 63 y.o. female.    Chief Complaint:  panhypopituitarism; Hypothyroidism; Osteopenia; and Adrenal Insufficiency      History of Present Illness    F/u of      New patient to me     On  DHEA-S 10-15 and now feeling much better      The patient has the following problems:   # Status post Cushing's disease with bilateral adrenalectomy.   # Radiation to the pituitary to avoid Dakota syndrome.   # Growth hormone deficiency.off of GH now  Last IGF1 normal  # Osteoporosis and scoliosis.   # Status post thyroidectomy for multinodular goiter and hypothyroidism    Father with thyroid cancer     In regards to the Cushing's and bilateral adrenalectomy, she is currently   taking prednisone 5- 7.5 mg per day mg per day.       Florinef 0.1 M/W/F/Ascencio  0.05 rest of the week     Osteoporosis   One fracture foot fracture  On fosamax since 2017       Review of Systems   Constitutional: Negative for fatigue and unexpected weight change.   HENT: Negative for hearing loss.    Eyes: Negative for visual disturbance.   Respiratory: Negative for cough and shortness of breath.    Cardiovascular: Negative for chest pain and palpitations.   Gastrointestinal: Negative for constipation and diarrhea.   Neurological: Negative for headaches.   Psychiatric/Behavioral: The patient is not nervous/anxious.        Objective:   Physical Exam   Constitutional: No distress.   Eyes: Right eye exhibits no discharge. Left eye exhibits no discharge.   Neck: Normal range of motion. No thyromegaly present.   Skin: She is not diaphoretic.   Psychiatric: She has a normal mood and affect. Her behavior is normal.   Vitals reviewed.      Lab Review:   Results for orders placed or performed during the hospital encounter of 01/29/18   ISTAT CREATININE   Result Value Ref Range    POC Creatinine 0.7 0.5 - 1.4 mg/dL    Sample ARTERIAL          Assessment:     # Cushing's disease s/p radiation and b/l adrenalectomy      # Adrenal insufficiency due to radiation to pituitary and b/l adrenalectomy   On prednisone   Knows sick day management   On florinef - continue current dose   Get CMP and renin level     # Hypothyroidism post thyroidectomy   - continue current dose of levothyroxine   - get FT4 levels     # taking DHEA   Get DHEA and DEHAS levels         Plan:     Follow up:  Labs today

## 2018-02-20 ENCOUNTER — LAB VISIT (OUTPATIENT)
Dept: LAB | Facility: HOSPITAL | Age: 63
End: 2018-02-20
Attending: INTERNAL MEDICINE
Payer: COMMERCIAL

## 2018-02-20 DIAGNOSIS — M81.8 OSTEOPOROSIS, IDIOPATHIC: ICD-10-CM

## 2018-02-20 DIAGNOSIS — E27.1 ADRENAL INSUFFICIENCY (ADDISON'S DISEASE): ICD-10-CM

## 2018-02-20 DIAGNOSIS — E89.0 HYPOTHYROIDISM, POSTSURGICAL: ICD-10-CM

## 2018-02-20 DIAGNOSIS — E23.0 PANHYPOPITUITARISM: ICD-10-CM

## 2018-02-20 LAB
25(OH)D3+25(OH)D2 SERPL-MCNC: 53 NG/ML
ALBUMIN SERPL BCP-MCNC: 3.9 G/DL
ALP SERPL-CCNC: 66 U/L
ALT SERPL W/O P-5'-P-CCNC: 11 U/L
ANION GAP SERPL CALC-SCNC: 9 MMOL/L
AST SERPL-CCNC: 17 U/L
BILIRUB SERPL-MCNC: 0.4 MG/DL
BUN SERPL-MCNC: 22 MG/DL
CALCIUM SERPL-MCNC: 9.2 MG/DL
CHLORIDE SERPL-SCNC: 106 MMOL/L
CO2 SERPL-SCNC: 25 MMOL/L
CREAT SERPL-MCNC: 0.8 MG/DL
DHEA-S SERPL-MCNC: 188 UG/DL
EST. GFR  (AFRICAN AMERICAN): >60 ML/MIN/1.73 M^2
EST. GFR  (NON AFRICAN AMERICAN): >60 ML/MIN/1.73 M^2
FSH SERPL-ACNC: 30.3 MIU/ML
GLUCOSE SERPL-MCNC: 79 MG/DL
LH SERPL-ACNC: 21.5 MIU/ML
POTASSIUM SERPL-SCNC: 4.1 MMOL/L
PROLACTIN SERPL IA-MCNC: 5.9 NG/ML
PROT SERPL-MCNC: 7 G/DL
SODIUM SERPL-SCNC: 140 MMOL/L
T4 FREE SERPL-MCNC: 1.5 NG/DL
TSH SERPL DL<=0.005 MIU/L-ACNC: 1.26 UIU/ML

## 2018-02-20 PROCEDURE — 83001 ASSAY OF GONADOTROPIN (FSH): CPT

## 2018-02-20 PROCEDURE — 80053 COMPREHEN METABOLIC PANEL: CPT

## 2018-02-20 PROCEDURE — 84443 ASSAY THYROID STIM HORMONE: CPT

## 2018-02-20 PROCEDURE — 84305 ASSAY OF SOMATOMEDIN: CPT

## 2018-02-20 PROCEDURE — 36415 COLL VENOUS BLD VENIPUNCTURE: CPT

## 2018-02-20 PROCEDURE — 82024 ASSAY OF ACTH: CPT

## 2018-02-20 PROCEDURE — 84244 ASSAY OF RENIN: CPT

## 2018-02-20 PROCEDURE — 82627 DEHYDROEPIANDROSTERONE: CPT

## 2018-02-20 PROCEDURE — 84439 ASSAY OF FREE THYROXINE: CPT

## 2018-02-20 PROCEDURE — 82306 VITAMIN D 25 HYDROXY: CPT

## 2018-02-20 PROCEDURE — 84146 ASSAY OF PROLACTIN: CPT

## 2018-02-20 PROCEDURE — 82626 DEHYDROEPIANDROSTERONE: CPT

## 2018-02-20 PROCEDURE — 83002 ASSAY OF GONADOTROPIN (LH): CPT

## 2018-02-22 LAB
IGF-I SERPL-MCNC: 56 NG/ML (ref 35–201)
IGF-I Z-SCORE SERPL: -1.28 SD
RENIN PLAS-CCNC: 12 NG/ML/H

## 2018-02-23 ENCOUNTER — PATIENT MESSAGE (OUTPATIENT)
Dept: ENDOCRINOLOGY | Facility: CLINIC | Age: 63
End: 2018-02-23

## 2018-02-23 DIAGNOSIS — E27.40 ADRENAL INSUFFICIENCY: Primary | ICD-10-CM

## 2018-02-23 LAB
ACTH PLAS-MCNC: <10 PG/ML
DHEA SERPL-MCNC: 1.54 NG/ML (ref 0.63–4.7)

## 2018-02-26 ENCOUNTER — PATIENT MESSAGE (OUTPATIENT)
Dept: ENDOCRINOLOGY | Facility: CLINIC | Age: 63
End: 2018-02-26

## 2018-02-27 ENCOUNTER — PATIENT MESSAGE (OUTPATIENT)
Dept: ENDOCRINOLOGY | Facility: CLINIC | Age: 63
End: 2018-02-27

## 2018-03-07 ENCOUNTER — LAB VISIT (OUTPATIENT)
Dept: LAB | Facility: HOSPITAL | Age: 63
End: 2018-03-07
Attending: INTERNAL MEDICINE
Payer: COMMERCIAL

## 2018-03-07 DIAGNOSIS — E27.40 ADRENAL INSUFFICIENCY: ICD-10-CM

## 2018-03-07 LAB
ALBUMIN SERPL BCP-MCNC: 3.5 G/DL
ALP SERPL-CCNC: 57 U/L
ALT SERPL W/O P-5'-P-CCNC: 11 U/L
ANION GAP SERPL CALC-SCNC: 8 MMOL/L
AST SERPL-CCNC: 16 U/L
BILIRUB SERPL-MCNC: 0.4 MG/DL
BUN SERPL-MCNC: 16 MG/DL
CALCIUM SERPL-MCNC: 8.9 MG/DL
CHLORIDE SERPL-SCNC: 108 MMOL/L
CO2 SERPL-SCNC: 27 MMOL/L
CREAT SERPL-MCNC: 0.8 MG/DL
EST. GFR  (AFRICAN AMERICAN): >60 ML/MIN/1.73 M^2
EST. GFR  (NON AFRICAN AMERICAN): >60 ML/MIN/1.73 M^2
GLUCOSE SERPL-MCNC: 145 MG/DL
POTASSIUM SERPL-SCNC: 4.9 MMOL/L
PROT SERPL-MCNC: 6.2 G/DL
SODIUM SERPL-SCNC: 143 MMOL/L

## 2018-03-07 PROCEDURE — 84244 ASSAY OF RENIN: CPT

## 2018-03-07 PROCEDURE — 36415 COLL VENOUS BLD VENIPUNCTURE: CPT

## 2018-03-07 PROCEDURE — 80053 COMPREHEN METABOLIC PANEL: CPT

## 2018-03-08 ENCOUNTER — OFFICE VISIT (OUTPATIENT)
Dept: UROLOGY | Facility: CLINIC | Age: 63
End: 2018-03-08
Payer: COMMERCIAL

## 2018-03-08 VITALS
WEIGHT: 135.38 LBS | HEIGHT: 60 IN | SYSTOLIC BLOOD PRESSURE: 111 MMHG | BODY MASS INDEX: 26.58 KG/M2 | HEART RATE: 88 BPM | DIASTOLIC BLOOD PRESSURE: 72 MMHG

## 2018-03-08 DIAGNOSIS — C67.2 MALIGNANT NEOPLASM OF LATERAL WALL OF URINARY BLADDER: Primary | ICD-10-CM

## 2018-03-08 LAB
BILIRUB SERPL-MCNC: ABNORMAL MG/DL
BLOOD URINE, POC: ABNORMAL
COLOR, POC UA: YELLOW
GLUCOSE UR QL STRIP: ABNORMAL
KETONES UR QL STRIP: ABNORMAL
LEUKOCYTE ESTERASE URINE, POC: ABNORMAL
NITRITE, POC UA: ABNORMAL
PH, POC UA: 6
PROTEIN, POC: ABNORMAL
SPECIFIC GRAVITY, POC UA: 1.01
UROBILINOGEN, POC UA: ABNORMAL

## 2018-03-08 PROCEDURE — 51720 TREATMENT OF BLADDER LESION: CPT | Mod: S$GLB,,, | Performed by: NURSE PRACTITIONER

## 2018-03-08 PROCEDURE — 81002 URINALYSIS NONAUTO W/O SCOPE: CPT | Mod: S$GLB,,, | Performed by: NURSE PRACTITIONER

## 2018-03-08 PROCEDURE — 99213 OFFICE O/P EST LOW 20 MIN: CPT | Mod: 25,S$GLB,, | Performed by: NURSE PRACTITIONER

## 2018-03-08 NOTE — PROGRESS NOTES
"Subjective:       Patient ID: Tess Duncan is a 63 y.o. female.    Chief Complaint: No chief complaint on file.      HPI: Tess Duncan is a 63 y.o. White female who presents today for intravesical BCG therapy for treatment of her non muscle-invasive bladder cancer.    The patient has a negative history of smoking.    The patient's bladder cancer history/chronology is: Small papillary tumor removed in OR (Path: HG Ta), ReTURBT: negative (January 2018)     This is the patient's 1/6 treatment. Denies urinary symptoms today.      Current Outpatient Prescriptions   Medication Sig Dispense Refill    alendronate (FOSAMAX) 70 MG tablet 1 tablet on Sunday with a full glass of water and wait 30 minutes for breakfast and pills. Take sitting or standing 4 tablet 12    calcium carbonate (CORAL CALCIUM) 390 mg  (1,000 mg) Tab Take by mouth.      cyanocobalamin (VITAMIN B-12) 500 MCG tablet Take 500 mcg by mouth once daily.      dexamethasone (DECADRON) 4 mg/mL injection Inject 4 mg as directed. 3 ml syringe with 25 G 1 1/2" needle use with dispensed needle and syring for adrenal insufficiency - Injection 1 mL 0    fludrocortisone (FLORINEF) 0.1 mg Tab Take 1 tablet (100 mcg total) by mouth once daily. 30 tablet 11    levothyroxine (SYNTHROID) 88 MCG tablet Take 1 tablet (88 mcg total) by mouth once daily. 30 tablet 11    phenazopyridine (PYRIDIUM) 100 MG tablet Take 2 tablets (200 mg total) by mouth 3 (three) times daily with meals. 18 tablet 0    prasterone, dhea, (DHEA) 25 mg Cap Take 20 mg by mouth.      predniSONE (DELTASONE) 5 MG tablet TAKE 1 TABLET BY MOUTH EVERY MORNING AND 1/2 TABLET EVERY EVENING 48 tablet 6    UNABLE TO FIND Take by mouth once daily.  MG       UNABLE TO FIND Take by mouth once daily. TUMERIC       vitamin D 1000 units Tab Take 185 mg by mouth once daily.       Current Facility-Administered Medications   Medication Dose Route Frequency Provider Last Rate Last Dose    BCG live " "(MOY) 50 mg in sodium chloride 0.9% 50 mL bladder instillation  50 mg Bladder Instillation 1 time in Clinic/HOD Whitney Gee MD        BCG live (MOY) 50 mg in sodium chloride 0.9% 50 mL bladder instillation  50 mg Bladder Instillation Weekly Whitney Gee MD        BCG live (MOY) 50 mg in sodium chloride 0.9% 50 mL bladder instillation  50 mg Bladder Instillation Weekly Whitney Gee MD           Past Medical History:   Diagnosis Date    Adrenal insufficiency (Switchback's disease)     Arthritis     Cancer     Hemangioma of liver     History of hematuria     Hyperlipidemia     Hypopituitarism     Hypothyroidism     Osteopenia     Osteoporosis     started alendronate - Feb 2017, was on alendronate "many" years ago x 1-2 years    Panhypopituitarism     Recurrent UTI     Renal cyst     Scoliosis        Past Surgical History:   Procedure Laterality Date    ADRENAL GLAND SURGERY      APPENDECTOMY      BLADDER SURGERY      CATARACT EXTRACTION      EYE SURGERY      cataract    HYSTERECTOMY      LASER LAPAROSCOPY      THYROID SURGERY       Review of Systems    Review of Systems   Constitutional: Negative for fever, chills, activity change, appetite change and unexpected weight change.   Respiratory: Negative for cough, choking, chest tightness and shortness of breath.    Cardiovascular: Negative for chest pain, palpitations and leg swelling.   Gastrointestinal: Negative for nausea, vomiting, abdominal pain, diarrhea, blood in stool, abdominal distention and anal bleeding.  Genitourinary: As documented per HPI   Musculoskeletal: Negative for myalgias, gait problem, neck pain and neck stiffness.   Skin: Negative for color change, pallor, rash and wound.   Psychiatric/Behavioral: Negative for hallucinations, behavioral problems, self-injury and agitation. The patient is not hyperactive.      All other systems were reviewed and were negative.    Objective:     Vitals:    03/08/18 " 1347   BP: 111/72   Pulse: 88     Physical Exam   Vitals reviewed.  Constitutional: She is oriented to person, place, and time. She appears well-developed and well-nourished. No distress.   Cardiovascular: Normal heart sounds and intact distal pulses.   Pulmonary/Chest: Effort normal and breath sounds normal.   Abdominal: Soft. Bowel sounds are normal. She exhibits no distension. There is no tenderness.  Musculoskeletal: Normal range of motion. She exhibits no edema.   Neurological: She is alert and oriented to person, place, and time. Coordination normal.   Skin: Skin is warm and dry. She is not diaphoretic.     Lab Results   Component Value Date    CREATININE 0.8 03/07/2018     Lab Results   Component Value Date    EGFRNONAA >60.0 03/07/2018     Lab Results   Component Value Date    ESTGFRAFRICA >60.0 03/07/2018     UA: leukocytes (trace), protein (trace), and RBCs (5-10 tre/mL)    Assessment:    Malignant neoplasm of lateral wall of urinary bladder     Plan:   Diagnoses and all orders for this visit:    Malignant neoplasm of lateral wall of urinary bladder    Plan:   --BCG today  --Follow up in 1 week for BCG

## 2018-03-08 NOTE — LETTER
March 8, 2018      Whitney Gee MD  [address] [place] [id number]           [facility]  [address] [place] [id number]  Phone: [phone]  Fax: [phone]          Patient: Tess Duncan   MR Number: [id number]   YOB: 1955   Date of Visit: 3/8/2018       Dear Dr. Whitney Gee:    Thank you for referring Tess Duncan to me for evaluation. Attached you will find relevant portions of my assessment and plan of care.    If you have questions, please do not hesitate to call me. I look forward to following Tess Duncan along with you.    Sincerely,    Ching Rico, NP    Enclosure  CC:  No Recipients    If you would like to receive this communication electronically, please contact [email or web address] or [phone] to request more information on [facility] access.    For providers and/or their staff who would like to refer a patient to [facility], please contact us through our one-stop-shop provider referral line, [facility], at [phone].    If you feel you have received this communication in error or would no longer like to receive these types of communications, please e-mail [email or web address]

## 2018-03-09 LAB — RENIN PLAS-CCNC: 1.2 NG/ML/H

## 2018-03-14 NOTE — PROGRESS NOTES
"Subjective:       Patient ID: Tess Duncan is a 63 y.o. female.    Chief Complaint: No chief complaint on file.    HPI: Tess Duncan is a 63 y.o. White female who presents today for intravesical BCG therapy for treatment of her non muscle-invasive bladder cancer.    The patient has a negative history of smoking.    The patient's bladder cancer history/chronology is: Small papillary tumor removed in OR (Path: HG Ta), ReTURBT: negative (January 2018)     This is the patient's 2/6 treatment. Denies urinary symptoms today.      Current Outpatient Prescriptions   Medication Sig Dispense Refill    alendronate (FOSAMAX) 70 MG tablet 1 tablet on Sunday with a full glass of water and wait 30 minutes for breakfast and pills. Take sitting or standing 4 tablet 12    calcium carbonate (CORAL CALCIUM) 390 mg  (1,000 mg) Tab Take by mouth.      cyanocobalamin (VITAMIN B-12) 500 MCG tablet Take 500 mcg by mouth once daily.      dexamethasone (DECADRON) 4 mg/mL injection Inject 4 mg as directed. 3 ml syringe with 25 G 1 1/2" needle use with dispensed needle and syring for adrenal insufficiency - Injection 1 mL 0    fludrocortisone (FLORINEF) 0.1 mg Tab Take 1 tablet (100 mcg total) by mouth once daily. 30 tablet 11    levothyroxine (SYNTHROID) 88 MCG tablet Take 1 tablet (88 mcg total) by mouth once daily. 30 tablet 11    phenazopyridine (PYRIDIUM) 100 MG tablet Take 2 tablets (200 mg total) by mouth 3 (three) times daily with meals. 18 tablet 0    prasterone, dhea, (DHEA) 25 mg Cap Take 20 mg by mouth.      predniSONE (DELTASONE) 5 MG tablet TAKE 1 TABLET BY MOUTH EVERY MORNING AND 1/2 TABLET EVERY EVENING 48 tablet 6    UNABLE TO FIND Take by mouth once daily.  MG       UNABLE TO FIND Take by mouth once daily. TUMERIC       vitamin D 1000 units Tab Take 185 mg by mouth once daily.       Current Facility-Administered Medications   Medication Dose Route Frequency Provider Last Rate Last Dose    BCG live " "(MOY) 50 mg in sodium chloride 0.9% 50 mL bladder instillation  50 mg Bladder Instillation 1 time in Clinic/HOD Whitney Gee MD        BCG live (MOY) 50 mg in sodium chloride 0.9% 50 mL bladder instillation  50 mg Bladder Instillation Weekly Whitney Gee MD        BCG live (MOY) 50 mg in sodium chloride 0.9% 50 mL bladder instillation  50 mg Bladder Instillation Weekly Whitney Gee MD   50 mg at 03/08/18 1457       Past Medical History:   Diagnosis Date    Adrenal insufficiency (Alto's disease)     Arthritis     Cancer     Hemangioma of liver     History of hematuria     Hyperlipidemia     Hypopituitarism     Hypothyroidism     Osteopenia     Osteoporosis     started alendronate - Feb 2017, was on alendronate "many" years ago x 1-2 years    Panhypopituitarism     Recurrent UTI     Renal cyst     Scoliosis        Past Surgical History:   Procedure Laterality Date    ADRENAL GLAND SURGERY      APPENDECTOMY      BLADDER SURGERY      CATARACT EXTRACTION      EYE SURGERY      cataract    HYSTERECTOMY      LASER LAPAROSCOPY      THYROID SURGERY       Review of Systems    Review of Systems   Constitutional: Negative for fever, chills, activity change, appetite change and unexpected weight change.   Respiratory: Negative for cough, choking, chest tightness and shortness of breath.    Cardiovascular: Negative for chest pain, palpitations and leg swelling.   Gastrointestinal: Negative for nausea, vomiting, abdominal pain, diarrhea, blood in stool, abdominal distention and anal bleeding.  Genitourinary: As documented per HPI   Musculoskeletal: Negative for myalgias, gait problem, neck pain and neck stiffness.   Skin: Negative for color change, pallor, rash and wound.   Psychiatric/Behavioral: Negative for hallucinations, behavioral problems, self-injury and agitation. The patient is not hyperactive.      All other systems were reviewed and were negative.    Objective: "     There were no vitals filed for this visit.  Physical Exam   Vitals reviewed.  Constitutional: She is oriented to person, place, and time. She appears well-developed and well-nourished. No distress.   Cardiovascular: Normal heart sounds and intact distal pulses.   Pulmonary/Chest: Effort normal and breath sounds normal.   Abdominal: Soft. Bowel sounds are normal. She exhibits no distension. There is no tenderness.  Musculoskeletal: Normal range of motion. She exhibits no edema.   Neurological: She is alert and oriented to person, place, and time. Coordination normal.   Skin: Skin is warm and dry. She is not diaphoretic.     Lab Results   Component Value Date    CREATININE 0.8 03/07/2018     Lab Results   Component Value Date    EGFRNONAA >60.0 03/07/2018     Lab Results   Component Value Date    ESTGFRAFRICA >60.0 03/07/2018     UA: leukocytes (trace), protein (trace), and RBCs (5-10 tre/mL)    Assessment:    Malignant neoplasm of lateral wall of urinary bladder     Plan:   Diagnoses and all orders for this visit:    Malignant neoplasm of lateral wall of urinary bladder    Plan:   --BCG today  --Follow up in 1 week for BCG

## 2018-03-15 ENCOUNTER — OFFICE VISIT (OUTPATIENT)
Dept: UROLOGY | Facility: CLINIC | Age: 63
End: 2018-03-15
Payer: COMMERCIAL

## 2018-03-15 VITALS
WEIGHT: 135 LBS | BODY MASS INDEX: 26.5 KG/M2 | HEART RATE: 76 BPM | HEIGHT: 60 IN | DIASTOLIC BLOOD PRESSURE: 76 MMHG | SYSTOLIC BLOOD PRESSURE: 112 MMHG

## 2018-03-15 DIAGNOSIS — C67.2 MALIGNANT NEOPLASM OF LATERAL WALL OF URINARY BLADDER: Primary | ICD-10-CM

## 2018-03-15 LAB
BILIRUB SERPL-MCNC: ABNORMAL MG/DL
BLOOD URINE, POC: ABNORMAL
COLOR, POC UA: YELLOW
GLUCOSE UR QL STRIP: ABNORMAL
KETONES UR QL STRIP: ABNORMAL
LEUKOCYTE ESTERASE URINE, POC: ABNORMAL
NITRITE, POC UA: ABNORMAL
PH, POC UA: 5
PROTEIN, POC: ABNORMAL
SPECIFIC GRAVITY, POC UA: 1.01
UROBILINOGEN, POC UA: ABNORMAL

## 2018-03-15 PROCEDURE — 99213 OFFICE O/P EST LOW 20 MIN: CPT | Mod: 25,S$GLB,, | Performed by: NURSE PRACTITIONER

## 2018-03-15 PROCEDURE — 81002 URINALYSIS NONAUTO W/O SCOPE: CPT | Mod: S$GLB,,, | Performed by: NURSE PRACTITIONER

## 2018-03-15 PROCEDURE — 51720 TREATMENT OF BLADDER LESION: CPT | Mod: S$GLB,,, | Performed by: NURSE PRACTITIONER

## 2018-03-15 NOTE — LETTER
March 15, 2018      Whitney Gee MD  [address] [place] [address]           [facility]  [address] [place] [id number]  Phone: [phone]  Fax: [phone]          Patient: Tess Duncan   MR Number: [id number]   YOB: 1955   Date of Visit: 3/15/2018       Dear Dr. Whitney Gee:    Thank you for referring Tess Duncan to me for evaluation. Attached you will find relevant portions of my assessment and plan of care.    If you have questions, please do not hesitate to call me. I look forward to following Tess Duncan along with you.    Sincerely,    Rhonda Burk, COLLIN    Enclosure  CC:  No Recipients    If you would like to receive this communication electronically, please contact [email or web address] or [phone] to request more information on [employer] Link access.    For providers and/or their staff who would like to refer a patient to [facility], please contact us through our one-stop-shop provider referral line, [facility], at [phone].    If you feel you have received this communication in error or would no longer like to receive these types of communications, please e-mail [email or web address]

## 2018-03-20 ENCOUNTER — TELEPHONE (OUTPATIENT)
Dept: UROLOGY | Facility: CLINIC | Age: 63
End: 2018-03-20

## 2018-03-20 NOTE — TELEPHONE ENCOUNTER
----- Message from Joel Reyna sent at 3/20/2018  4:17 PM CDT -----  Contact: patient  x_ 1st Request   _ 2nd Request   _ 3rd Request     Who: GAB ESPARZA [[id number]]    Why: patient called back stated she will come at 11:30 for BCG this Thursday per your request.    What Number to Call Back: [phone]    When to Expect a call back: (Before the end of the day)   -- if call after 3:00 call back will be tomorrow.

## 2018-03-22 ENCOUNTER — OFFICE VISIT (OUTPATIENT)
Dept: UROLOGY | Facility: CLINIC | Age: 63
End: 2018-03-22
Payer: COMMERCIAL

## 2018-03-22 VITALS
WEIGHT: 140.75 LBS | HEIGHT: 60 IN | BODY MASS INDEX: 27.63 KG/M2 | DIASTOLIC BLOOD PRESSURE: 69 MMHG | SYSTOLIC BLOOD PRESSURE: 111 MMHG | HEART RATE: 65 BPM

## 2018-03-22 DIAGNOSIS — C67.2 MALIGNANT NEOPLASM OF LATERAL WALL OF URINARY BLADDER: Primary | ICD-10-CM

## 2018-03-22 LAB
BILIRUB SERPL-MCNC: NORMAL MG/DL
BLOOD URINE, POC: NORMAL
COLOR, POC UA: YELLOW
GLUCOSE UR QL STRIP: NORMAL
KETONES UR QL STRIP: NORMAL
LEUKOCYTE ESTERASE URINE, POC: NORMAL
NITRITE, POC UA: NORMAL
PH, POC UA: 7
PROTEIN, POC: NORMAL
SPECIFIC GRAVITY, POC UA: 1
UROBILINOGEN, POC UA: NORMAL

## 2018-03-22 PROCEDURE — 51720 TREATMENT OF BLADDER LESION: CPT | Mod: S$GLB,,, | Performed by: NURSE PRACTITIONER

## 2018-03-22 PROCEDURE — 99213 OFFICE O/P EST LOW 20 MIN: CPT | Mod: 25,S$GLB,, | Performed by: NURSE PRACTITIONER

## 2018-03-22 PROCEDURE — 81002 URINALYSIS NONAUTO W/O SCOPE: CPT | Mod: S$GLB,,, | Performed by: NURSE PRACTITIONER

## 2018-03-22 NOTE — LETTER
March 22, 2018      Whitney Gee MD  [address] [place] [address]           [facility]  [address] [place] [id number]  Phone: [phone]  Fax: [phone]          Patient: Tess Duncan   MR Number: [id number]   YOB: 1955   Date of Visit: 3/22/2018       Dear Dr. Whitney Gee:    Thank you for referring Tess Duncan to me for evaluation. Attached you will find relevant portions of my assessment and plan of care.    If you have questions, please do not hesitate to call me. I look forward to following Tess Duncan along with you.    Sincerely,    Ching Rico, NP    Enclosure  CC:  No Recipients    If you would like to receive this communication electronically, please contact [email or web address] or [phone] to request more information on [employer] Link access.    For providers and/or their staff who would like to refer a patient to [facility], please contact us through our one-stop-shop provider referral line, [facility], at [phone].    If you feel you have received this communication in error or would no longer like to receive these types of communications, please e-mail [email or web address]

## 2018-03-22 NOTE — PROGRESS NOTES
"Subjective:       Patient ID: Tess Duncan is a 63 y.o. female.    Chief Complaint: Follow-up (BCG x 3)    HPI: Tess Duncan is a 63 y.o. White female who presents today for intravesical BCG therapy for treatment of her non muscle-invasive bladder cancer.    The patient has a negative history of smoking.    The patient's bladder cancer history/chronology is: Small papillary tumor removed in OR (Path: HG Ta), ReTURBT: negative (January 2018)     This is the patient's 3/6 treatment. Denies urinary symptoms today.      Current Outpatient Prescriptions   Medication Sig Dispense Refill    alendronate (FOSAMAX) 70 MG tablet 1 tablet on Sunday with a full glass of water and wait 30 minutes for breakfast and pills. Take sitting or standing 4 tablet 12    calcium carbonate (CORAL CALCIUM) 390 mg  (1,000 mg) Tab Take by mouth.      cyanocobalamin (VITAMIN B-12) 500 MCG tablet Take 500 mcg by mouth once daily.      dexamethasone (DECADRON) 4 mg/mL injection Inject 4 mg as directed. 3 ml syringe with 25 G 1 1/2" needle use with dispensed needle and syring for adrenal insufficiency - Injection 1 mL 0    fludrocortisone (FLORINEF) 0.1 mg Tab Take 1 tablet (100 mcg total) by mouth once daily. 30 tablet 11    levothyroxine (SYNTHROID) 88 MCG tablet Take 1 tablet (88 mcg total) by mouth once daily. 30 tablet 11    phenazopyridine (PYRIDIUM) 100 MG tablet Take 2 tablets (200 mg total) by mouth 3 (three) times daily with meals. 18 tablet 0    prasterone, dhea, (DHEA) 25 mg Cap Take 20 mg by mouth.      predniSONE (DELTASONE) 5 MG tablet TAKE 1 TABLET BY MOUTH EVERY MORNING AND 1/2 TABLET EVERY EVENING 48 tablet 6    UNABLE TO FIND Take by mouth once daily.  MG       UNABLE TO FIND Take by mouth once daily. TUMERIC       vitamin D 1000 units Tab Take 185 mg by mouth once daily.       Current Facility-Administered Medications   Medication Dose Route Frequency Provider Last Rate Last Dose    BCG live (MOY) 50 " "mg in sodium chloride 0.9% 50 mL bladder instillation  50 mg Bladder Instillation 1 time in Clinic/HOD Whitney Gee MD        BCG live (MOY) 50 mg in sodium chloride 0.9% 50 mL bladder instillation  50 mg Bladder Instillation Weekly Whitney Gee MD        BCG live (MOY) 50 mg in sodium chloride 0.9% 50 mL bladder instillation  50 mg Bladder Instillation Weekly Whitney Gee MD   50 mg at 03/15/18 1421       Past Medical History:   Diagnosis Date    Adrenal insufficiency (Kranthi's disease)     Arthritis     Cancer     Hemangioma of liver     History of hematuria     Hyperlipidemia     Hypopituitarism     Hypothyroidism     Osteopenia     Osteoporosis     started alendronate - Feb 2017, was on alendronate "many" years ago x 1-2 years    Panhypopituitarism     Recurrent UTI     Renal cyst     Scoliosis        Past Surgical History:   Procedure Laterality Date    ADRENAL GLAND SURGERY      APPENDECTOMY      BLADDER SURGERY      CATARACT EXTRACTION      EYE SURGERY      cataract    HYSTERECTOMY      LASER LAPAROSCOPY      THYROID SURGERY       Review of Systems    Review of Systems   Constitutional: Negative for fever, chills, activity change, appetite change and unexpected weight change.   Respiratory: Negative for cough, choking, chest tightness and shortness of breath.    Cardiovascular: Negative for chest pain, palpitations and leg swelling.   Gastrointestinal: Negative for nausea, vomiting, abdominal pain, diarrhea, blood in stool, abdominal distention and anal bleeding.  Genitourinary: As documented per HPI   Musculoskeletal: Negative for myalgias, gait problem, neck pain and neck stiffness.   Skin: Negative for color change, pallor, rash and wound.   Psychiatric/Behavioral: Negative for hallucinations, behavioral problems, self-injury and agitation. The patient is not hyperactive.      All other systems were reviewed and were negative.    Objective:     Vitals: "    03/22/18 1118   BP: 111/69   Pulse: 65     Physical Exam   Vitals reviewed.  Constitutional: She is oriented to person, place, and time. She appears well-developed and well-nourished. No distress.   Cardiovascular: Normal heart sounds and intact distal pulses.   Pulmonary/Chest: Effort normal and breath sounds normal.   Abdominal: Soft. Bowel sounds are normal. She exhibits no distension. There is no tenderness.  Musculoskeletal: Normal range of motion. She exhibits no edema.   Neurological: She is alert and oriented to person, place, and time. Coordination normal.   Skin: Skin is warm and dry. She is not diaphoretic.     Lab Results   Component Value Date    CREATININE 0.8 03/07/2018     Lab Results   Component Value Date    EGFRNONAA >60.0 03/07/2018     Lab Results   Component Value Date    ESTGFRAFRICA >60.0 03/07/2018     UA: all negative    Assessment:    Malignant neoplasm of lateral wall of urinary bladder     Plan:   Tess was seen today for follow-up.    Diagnoses and all orders for this visit:    Malignant neoplasm of lateral wall of urinary bladder    Other orders  -     POCT URINE DIPSTICK WITHOUT MICROSCOPE    Plan:   --BCG today  --Follow up in 1 week for BCG

## 2018-03-29 ENCOUNTER — OFFICE VISIT (OUTPATIENT)
Dept: UROLOGY | Facility: CLINIC | Age: 63
End: 2018-03-29
Payer: COMMERCIAL

## 2018-03-29 VITALS
HEIGHT: 60 IN | DIASTOLIC BLOOD PRESSURE: 78 MMHG | HEART RATE: 70 BPM | WEIGHT: 140.88 LBS | BODY MASS INDEX: 27.66 KG/M2 | SYSTOLIC BLOOD PRESSURE: 127 MMHG

## 2018-03-29 DIAGNOSIS — C67.2 MALIGNANT NEOPLASM OF LATERAL WALL OF URINARY BLADDER: Primary | ICD-10-CM

## 2018-03-29 LAB
BILIRUB SERPL-MCNC: ABNORMAL MG/DL
BLOOD URINE, POC: ABNORMAL
COLOR, POC UA: YELLOW
GLUCOSE UR QL STRIP: ABNORMAL
KETONES UR QL STRIP: ABNORMAL
LEUKOCYTE ESTERASE URINE, POC: ABNORMAL
NITRITE, POC UA: ABNORMAL
PH, POC UA: 7
PROTEIN, POC: ABNORMAL
SPECIFIC GRAVITY, POC UA: 1.01
UROBILINOGEN, POC UA: ABNORMAL

## 2018-03-29 PROCEDURE — 51720 TREATMENT OF BLADDER LESION: CPT | Mod: S$GLB,,, | Performed by: NURSE PRACTITIONER

## 2018-03-29 PROCEDURE — 81002 URINALYSIS NONAUTO W/O SCOPE: CPT | Mod: S$GLB,,, | Performed by: NURSE PRACTITIONER

## 2018-03-29 PROCEDURE — 99213 OFFICE O/P EST LOW 20 MIN: CPT | Mod: 25,S$GLB,, | Performed by: NURSE PRACTITIONER

## 2018-03-29 NOTE — LETTER
March 29, 2018      Whitney Gee MD  [address] [place] [address]           [facility]  [address] [place] [address]  Phone: [phone]  Fax: [phone]          Patient: Tess Duncan   MR Number: [id number]   YOB: 1955   Date of Visit: 3/29/2018       Dear Dr. Whitney Gee:    Thank you for referring Tess Duncan to me for evaluation. Attached you will find relevant portions of my assessment and plan of care.    If you have questions, please do not hesitate to call me. I look forward to following Tess Duncan along with you.    Sincerely,    Ching Rico, NP    Enclosure  CC:  No Recipients    If you would like to receive this communication electronically, please contact [email or web address] or [phone] to request more information on [employer] Link access.    For providers and/or their staff who would like to refer a patient to [facility], please contact us through our one-stop-shop provider referral line, [facility], at [phone].    If you feel you have received this communication in error or would no longer like to receive these types of communications, please e-mail [email or web address]

## 2018-03-29 NOTE — PROGRESS NOTES
"Subjective:       Patient ID: Tess Duncan is a 63 y.o. female.    Chief Complaint: Establish Care (BCG)    HPI: Tess Duncan is a 63 y.o. White female who presents today for intravesical BCG therapy for treatment of her non muscle-invasive bladder cancer.    The patient has a negative history of smoking.    The patient's bladder cancer history/chronology is: Small papillary tumor removed in OR (Path: HG Ta), ReTURBT: negative (January 2018)     This is the patient's 4/6 treatments. Denies urinary symptoms today.      Current Outpatient Prescriptions   Medication Sig Dispense Refill    alendronate (FOSAMAX) 70 MG tablet 1 tablet on Sunday with a full glass of water and wait 30 minutes for breakfast and pills. Take sitting or standing 4 tablet 12    calcium carbonate (CORAL CALCIUM) 390 mg  (1,000 mg) Tab Take by mouth.      cyanocobalamin (VITAMIN B-12) 500 MCG tablet Take 500 mcg by mouth once daily.      dexamethasone (DECADRON) 4 mg/mL injection Inject 4 mg as directed. 3 ml syringe with 25 G 1 1/2" needle use with dispensed needle and syring for adrenal insufficiency - Injection 1 mL 0    fludrocortisone (FLORINEF) 0.1 mg Tab Take 1 tablet (100 mcg total) by mouth once daily. 30 tablet 11    levothyroxine (SYNTHROID) 88 MCG tablet Take 1 tablet (88 mcg total) by mouth once daily. 30 tablet 11    phenazopyridine (PYRIDIUM) 100 MG tablet Take 2 tablets (200 mg total) by mouth 3 (three) times daily with meals. 18 tablet 0    prasterone, dhea, (DHEA) 25 mg Cap Take 20 mg by mouth.      predniSONE (DELTASONE) 5 MG tablet TAKE 1 TABLET BY MOUTH EVERY MORNING AND 1/2 TABLET EVERY EVENING 48 tablet 6    UNABLE TO FIND Take by mouth once daily.  MG       UNABLE TO FIND Take by mouth once daily. TUMERIC       vitamin D 1000 units Tab Take 185 mg by mouth once daily.       Current Facility-Administered Medications   Medication Dose Route Frequency Provider Last Rate Last Dose    BCG live (MOY) " "50 mg in sodium chloride 0.9% 50 mL bladder instillation  50 mg Bladder Instillation 1 time in Clinic/HOD Whitney Gee MD        BCG live (MOY) 50 mg in sodium chloride 0.9% 50 mL bladder instillation  50 mg Bladder Instillation Weekly Whitney Gee MD        BCG live (MOY) 50 mg in sodium chloride 0.9% 50 mL bladder instillation  50 mg Bladder Instillation Weekly Whitney eGe MD   50 mg at 03/29/18 1434       Past Medical History:   Diagnosis Date    Adrenal insufficiency (Kranthi's disease)     Arthritis     Cancer     Hemangioma of liver     History of hematuria     Hyperlipidemia     Hypopituitarism     Hypothyroidism     Osteopenia     Osteoporosis     started alendronate - Feb 2017, was on alendronate "many" years ago x 1-2 years    Panhypopituitarism     Recurrent UTI     Renal cyst     Scoliosis        Past Surgical History:   Procedure Laterality Date    ADRENAL GLAND SURGERY      APPENDECTOMY      BLADDER SURGERY      CATARACT EXTRACTION      EYE SURGERY      cataract    HYSTERECTOMY      LASER LAPAROSCOPY      THYROID SURGERY       Review of Systems    Review of Systems   Constitutional: Negative for fever, chills, activity change, appetite change and unexpected weight change.   Respiratory: Negative for cough, choking, chest tightness and shortness of breath.    Cardiovascular: Negative for chest pain, palpitations and leg swelling.   Gastrointestinal: Negative for nausea, vomiting, abdominal pain, diarrhea, blood in stool, abdominal distention and anal bleeding.  Genitourinary: As documented per HPI   Musculoskeletal: Negative for myalgias, gait problem, neck pain and neck stiffness.   Skin: Negative for color change, pallor, rash and wound.   Psychiatric/Behavioral: Negative for hallucinations, behavioral problems, self-injury and agitation. The patient is not hyperactive.      All other systems were reviewed and were negative.    Objective: "     Vitals:    03/29/18 1358   BP: 127/78   Pulse: 70     Physical Exam   Vitals reviewed.  Constitutional: She is oriented to person, place, and time. She appears well-developed and well-nourished. No distress.   Cardiovascular: Normal heart sounds and intact distal pulses.   Pulmonary/Chest: Effort normal and breath sounds normal.   Abdominal: Soft. Bowel sounds are normal. She exhibits no distension. There is no tenderness.  Musculoskeletal: Normal range of motion. She exhibits no edema.   Neurological: She is alert and oriented to person, place, and time. Coordination normal.   Skin: Skin is warm and dry. She is not diaphoretic.     Lab Results   Component Value Date    CREATININE 0.8 03/07/2018     Lab Results   Component Value Date    EGFRNONAA >60.0 03/07/2018     Lab Results   Component Value Date    ESTGFRAFRICA >60.0 03/07/2018     UA: leukocytes (trace)     BCG mixed per protocol, pt catheterized per sterile technique.  BCG instilled into bladder by COLLIN Ellis.    Assessment:    Malignant neoplasm of lateral wall of urinary bladder     Plan:   Tess was seen today for establish care.    Diagnoses and all orders for this visit:    Malignant neoplasm of lateral wall of urinary bladder    Other orders  -     POCT URINE DIPSTICK WITHOUT MICROSCOPE    Plan:   --BCG today  --Follow up in 1 week for BCG

## 2018-04-03 NOTE — PROGRESS NOTES
"Subjective:       Patient ID: Tess Duncan is a 63 y.o. female.    Chief Complaint: No chief complaint on file.    HPI: Tess Duncan is a 63 y.o. White female who presents today for intravesical BCG therapy for treatment of her non muscle-invasive bladder cancer.    The patient has a negative history of smoking.    The patient's bladder cancer history/chronology is: Small papillary tumor removed in OR (Path: HG Ta), ReTURBT: negative (January 2018)     This is the patient's 5/6 treatments. Has tolerated previous treatments well. Denies urinary symptoms today.      Current Outpatient Prescriptions   Medication Sig Dispense Refill    alendronate (FOSAMAX) 70 MG tablet 1 tablet on Sunday with a full glass of water and wait 30 minutes for breakfast and pills. Take sitting or standing 4 tablet 12    calcium carbonate (CORAL CALCIUM) 390 mg  (1,000 mg) Tab Take by mouth.      cyanocobalamin (VITAMIN B-12) 500 MCG tablet Take 500 mcg by mouth once daily.      dexamethasone (DECADRON) 4 mg/mL injection Inject 4 mg as directed. 3 ml syringe with 25 G 1 1/2" needle use with dispensed needle and syring for adrenal insufficiency - Injection 1 mL 0    fludrocortisone (FLORINEF) 0.1 mg Tab Take 1 tablet (100 mcg total) by mouth once daily. 30 tablet 11    levothyroxine (SYNTHROID) 88 MCG tablet Take 1 tablet (88 mcg total) by mouth once daily. 30 tablet 11    phenazopyridine (PYRIDIUM) 100 MG tablet Take 2 tablets (200 mg total) by mouth 3 (three) times daily with meals. 18 tablet 0    prasterone, dhea, (DHEA) 25 mg Cap Take 20 mg by mouth.      predniSONE (DELTASONE) 5 MG tablet TAKE 1 TABLET BY MOUTH EVERY MORNING AND 1/2 TABLET EVERY EVENING 48 tablet 6    UNABLE TO FIND Take by mouth once daily.  MG       UNABLE TO FIND Take by mouth once daily. TUMERIC       vitamin D 1000 units Tab Take 185 mg by mouth once daily.       Current Facility-Administered Medications   Medication Dose Route Frequency " "Provider Last Rate Last Dose    BCG live (MOY) 50 mg in sodium chloride 0.9% 50 mL bladder instillation  50 mg Bladder Instillation 1 time in Clinic/HOD Whitney Gee MD        BCG live (MOY) 50 mg in sodium chloride 0.9% 50 mL bladder instillation  50 mg Bladder Instillation Weekly Whitney Gee MD        BCG live (MOY) 50 mg in sodium chloride 0.9% 50 mL bladder instillation  50 mg Bladder Instillation Weekly Whitney Gee MD   50 mg at 03/29/18 1434       Past Medical History:   Diagnosis Date    Adrenal insufficiency ([place]'s disease)     Arthritis     Cancer     Hemangioma of liver     History of hematuria     Hyperlipidemia     Hypopituitarism     Hypothyroidism     Osteopenia     Osteoporosis     started alendronate - Feb 2017, was on alendronate "many" years ago x 1-2 years    Panhypopituitarism     Recurrent UTI     Renal cyst     Scoliosis        Past Surgical History:   Procedure Laterality Date    ADRENAL GLAND SURGERY      APPENDECTOMY      BLADDER SURGERY      CATARACT EXTRACTION      EYE SURGERY      cataract    HYSTERECTOMY      LASER LAPAROSCOPY      THYROID SURGERY       Review of Systems    Review of Systems   Constitutional: Negative for fever, chills, activity change, appetite change and unexpected weight change.   Respiratory: Negative for cough, choking, chest tightness and shortness of breath.    Cardiovascular: Negative for chest pain, palpitations and leg swelling.   Gastrointestinal: Negative for nausea, vomiting, abdominal pain, diarrhea, blood in stool, abdominal distention and anal bleeding.  Genitourinary: As documented per HPI   Musculoskeletal: Negative for myalgias, gait problem, neck pain and neck stiffness.   Skin: Negative for color change, pallor, rash and wound.   Psychiatric/Behavioral: Negative for hallucinations, behavioral problems, self-injury and agitation. The patient is not hyperactive.      All other systems were " reviewed and were negative.    Objective:     Vitals:    04/05/18 1343   BP: 118/75   Pulse: 75     Physical Exam   Vitals reviewed.  Constitutional: She is oriented to person, place, and time. She appears well-developed and well-nourished. No distress.   Cardiovascular: Normal heart sounds and intact distal pulses.   Pulmonary/Chest: Effort normal and breath sounds normal.   Abdominal: Soft. Bowel sounds are normal. She exhibits no distension. There is no tenderness.  Musculoskeletal: Normal range of motion. She exhibits no edema.   Neurological: She is alert and oriented to person, place, and time. Coordination normal.   Skin: Skin is warm and dry. She is not diaphoretic.     Lab Results   Component Value Date    CREATININE 0.8 03/07/2018     Lab Results   Component Value Date    EGFRNONAA >60.0 03/07/2018     Lab Results   Component Value Date    ESTGFRAFRICA >60.0 03/07/2018     UA: all negative      BCG mixed per protocol, pt catheterized per sterile technique.  BCG instilled into bladder by COLLIN Ellis.     Assessment:    Malignant neoplasm of lateral wall of urinary bladder     Plan:   Diagnoses and all orders for this visit:    Malignant neoplasm of lateral wall of urinary bladder    Plan:   --BCG today  --Follow up in 1 week for BCG

## 2018-04-05 ENCOUNTER — OFFICE VISIT (OUTPATIENT)
Dept: UROLOGY | Facility: CLINIC | Age: 63
End: 2018-04-05
Payer: COMMERCIAL

## 2018-04-05 VITALS
HEIGHT: 60 IN | BODY MASS INDEX: 27.48 KG/M2 | SYSTOLIC BLOOD PRESSURE: 118 MMHG | HEART RATE: 75 BPM | DIASTOLIC BLOOD PRESSURE: 75 MMHG | WEIGHT: 140 LBS

## 2018-04-05 DIAGNOSIS — C67.2 MALIGNANT NEOPLASM OF LATERAL WALL OF URINARY BLADDER: Primary | ICD-10-CM

## 2018-04-05 LAB
BILIRUB SERPL-MCNC: NORMAL MG/DL
BLOOD URINE, POC: NORMAL
COLOR, POC UA: NORMAL
GLUCOSE UR QL STRIP: NORMAL
KETONES UR QL STRIP: NORMAL
LEUKOCYTE ESTERASE URINE, POC: NORMAL
NITRITE, POC UA: NORMAL
PH, POC UA: 7
PROTEIN, POC: NORMAL
SPECIFIC GRAVITY, POC UA: 1
UROBILINOGEN, POC UA: NORMAL

## 2018-04-05 PROCEDURE — 81002 URINALYSIS NONAUTO W/O SCOPE: CPT | Mod: S$GLB,,, | Performed by: NURSE PRACTITIONER

## 2018-04-05 PROCEDURE — 51720 TREATMENT OF BLADDER LESION: CPT | Mod: S$GLB,,, | Performed by: NURSE PRACTITIONER

## 2018-04-05 PROCEDURE — 99213 OFFICE O/P EST LOW 20 MIN: CPT | Mod: 25,S$GLB,, | Performed by: NURSE PRACTITIONER

## 2018-04-05 NOTE — LETTER
April 5, 2018      Whitney Gee MD  [address] [place] [address]           [facility] - Urology  [address] [place] [address]  Phone: [phone]  Fax: [phone]          Patient: Tess Duncan   MR Number: [id number]   YOB: 1955   Date of Visit: 4/5/2018       Dear Dr. Whitney Gee:    Thank you for referring Tess Duncan to me for evaluation. Attached you will find relevant portions of my assessment and plan of care.    If you have questions, please do not hesitate to call me. I look forward to following Tess Duncan along with you.    Sincerely,    Ching Rico, NP    Enclosure  CC:  No Recipients    If you would like to receive this communication electronically, please contact [email or web address] or [phone] to request more information on [employer] Link access.    For providers and/or their staff who would like to refer a patient to [facility], please contact us through our one-stop-shop provider referral line, [facility], at [phone].    If you feel you have received this communication in error or would no longer like to receive these types of communications, please e-mail [email or web address]

## 2018-04-11 NOTE — PROGRESS NOTES
"Subjective:       Patient ID: Tess Duncan is a 63 y.o. female.    Chief Complaint: No chief complaint on file.    HPI: Tess Duncan is a 63 y.o. White female who presents today for intravesical BCG therapy for treatment of her non muscle-invasive bladder cancer.    The patient has a negative history of smoking.    The patient's bladder cancer history/chronology is: Small papillary tumor removed in OR (Path: HG Ta), ReTURBT: negative (January 2018)     This is the patient's 6/6 treatments. Has tolerated previous treatments well. Denies urinary symptoms today.      Current Outpatient Prescriptions   Medication Sig Dispense Refill    alendronate (FOSAMAX) 70 MG tablet 1 tablet on Sunday with a full glass of water and wait 30 minutes for breakfast and pills. Take sitting or standing 4 tablet 12    calcium carbonate (CORAL CALCIUM) 390 mg  (1,000 mg) Tab Take by mouth.      cyanocobalamin (VITAMIN B-12) 500 MCG tablet Take 500 mcg by mouth once daily.      dexamethasone (DECADRON) 4 mg/mL injection Inject 4 mg as directed. 3 ml syringe with 25 G 1 1/2" needle use with dispensed needle and syring for adrenal insufficiency - Injection 1 mL 0    fludrocortisone (FLORINEF) 0.1 mg Tab Take 1 tablet (100 mcg total) by mouth once daily. 30 tablet 11    levothyroxine (SYNTHROID) 88 MCG tablet Take 1 tablet (88 mcg total) by mouth once daily. 30 tablet 11    phenazopyridine (PYRIDIUM) 100 MG tablet Take 2 tablets (200 mg total) by mouth 3 (three) times daily with meals. 18 tablet 0    prasterone, dhea, (DHEA) 25 mg Cap Take 20 mg by mouth.      predniSONE (DELTASONE) 5 MG tablet TAKE 1 TABLET BY MOUTH EVERY MORNING AND 1/2 TABLET EVERY EVENING 48 tablet 6    UNABLE TO FIND Take by mouth once daily.  MG       UNABLE TO FIND Take by mouth once daily. TUMERIC       vitamin D 1000 units Tab Take 185 mg by mouth once daily.       Current Facility-Administered Medications   Medication Dose Route Frequency " "Provider Last Rate Last Dose    BCG live (MOY) 50 mg in sodium chloride 0.9% 50 mL bladder instillation  50 mg Bladder Instillation 1 time in Clinic/HOD Whitney Gee MD        BCG live (MOY) 50 mg in sodium chloride 0.9% 50 mL bladder instillation  50 mg Bladder Instillation Weekly Whitney Gee MD        BCG live (MOY) 50 mg in sodium chloride 0.9% 50 mL bladder instillation  50 mg Bladder Instillation Weekly Whitney Gee MD   50 mg at 04/05/18 1412       Past Medical History:   Diagnosis Date    Adrenal insufficiency ([place]'s disease)     Arthritis     Cancer     Hemangioma of liver     History of hematuria     Hyperlipidemia     Hypopituitarism     Hypothyroidism     Osteopenia     Osteoporosis     started alendronate - Feb 2017, was on alendronate "many" years ago x 1-2 years    Panhypopituitarism     Recurrent UTI     Renal cyst     Scoliosis        Past Surgical History:   Procedure Laterality Date    ADRENAL GLAND SURGERY      APPENDECTOMY      BLADDER SURGERY      CATARACT EXTRACTION      EYE SURGERY      cataract    HYSTERECTOMY      LASER LAPAROSCOPY      THYROID SURGERY       Review of Systems    Review of Systems   Constitutional: Negative for fever, chills, activity change, appetite change and unexpected weight change.   Respiratory: Negative for cough, choking, chest tightness and shortness of breath.    Cardiovascular: Negative for chest pain, palpitations and leg swelling.   Gastrointestinal: Negative for nausea, vomiting, abdominal pain, diarrhea, blood in stool, abdominal distention and anal bleeding.  Genitourinary: As documented per HPI   Musculoskeletal: Negative for myalgias, gait problem, neck pain and neck stiffness.   Skin: Negative for color change, pallor, rash and wound.   Psychiatric/Behavioral: Negative for hallucinations, behavioral problems, self-injury and agitation. The patient is not hyperactive.      All other systems were " reviewed and were negative.    Objective:     Vitals:    04/12/18 1355   BP: 107/65   Pulse: 83     Physical Exam   Vitals reviewed.  Constitutional: She is oriented to person, place, and time. She appears well-developed and well-nourished. No distress.   Cardiovascular: Normal heart sounds and intact distal pulses.   Pulmonary/Chest: Effort normal and breath sounds normal.   Abdominal: Soft. Bowel sounds are normal. She exhibits no distension. There is no tenderness.  Musculoskeletal: Normal range of motion. She exhibits no edema.   Neurological: She is alert and oriented to person, place, and time. Coordination normal.   Skin: Skin is warm and dry. She is not diaphoretic.     Lab Results   Component Value Date    CREATININE 0.8 03/07/2018     Lab Results   Component Value Date    EGFRNONAA >60.0 03/07/2018     Lab Results   Component Value Date    ESTGFRAFRICA >60.0 03/07/2018     UA: positive for leukocytes (trace), protein (trace), RBCs (5-10 rte/mL)     BCG mixed per protocol, pt catheterized per sterile technique.  BCG instilled into bladder by COLLIN Ellis.     Assessment:    Malignant neoplasm of lateral wall of urinary bladder     Plan:   Diagnoses and all orders for this visit:    Malignant neoplasm of lateral wall of urinary bladder    Plan:   --BCG today  --Follow up for cysto and cytology in June with Dr. Gee

## 2018-04-12 ENCOUNTER — OFFICE VISIT (OUTPATIENT)
Dept: UROLOGY | Facility: CLINIC | Age: 63
End: 2018-04-12
Payer: COMMERCIAL

## 2018-04-12 VITALS
WEIGHT: 140 LBS | HEIGHT: 60 IN | DIASTOLIC BLOOD PRESSURE: 65 MMHG | BODY MASS INDEX: 27.48 KG/M2 | SYSTOLIC BLOOD PRESSURE: 107 MMHG | HEART RATE: 83 BPM

## 2018-04-12 DIAGNOSIS — C67.2 MALIGNANT NEOPLASM OF LATERAL WALL OF URINARY BLADDER: Primary | ICD-10-CM

## 2018-04-12 PROCEDURE — 51720 TREATMENT OF BLADDER LESION: CPT | Mod: S$GLB,,, | Performed by: NURSE PRACTITIONER

## 2018-04-12 PROCEDURE — 99213 OFFICE O/P EST LOW 20 MIN: CPT | Mod: 25,S$GLB,, | Performed by: NURSE PRACTITIONER

## 2018-04-12 NOTE — LETTER
April 12, 2018      Whitney Gee MD  [address] [place] [address]           [facility] - Urology  [address] [place] [address]  Phone: [phone]  Fax: [phone]          Patient: Tess Duncan   MR Number: [id number]   YOB: 1955   Date of Visit: 4/12/2018       Dear Dr. Whitney Gee:    Thank you for referring Tess Duncan to me for evaluation. Attached you will find relevant portions of my assessment and plan of care.    If you have questions, please do not hesitate to call me. I look forward to following Tess Duncan along with you.    Sincerely,    Ching Rico, NP    Enclosure  CC:  No Recipients    If you would like to receive this communication electronically, please contact [email or web address] or [phone] to request more information on [employer] Link access.    For providers and/or their staff who would like to refer a patient to [facility], please contact us through our one-stop-shop provider referral line, [facility], at [phone].    If you feel you have received this communication in error or would no longer like to receive these types of communications, please e-mail [email or web address]

## 2018-05-07 ENCOUNTER — OFFICE VISIT (OUTPATIENT)
Dept: OPHTHALMOLOGY | Facility: CLINIC | Age: 63
End: 2018-05-07
Payer: COMMERCIAL

## 2018-05-07 DIAGNOSIS — Z98.41 STATUS POST CATARACT EXTRACTION AND INSERTION OF INTRAOCULAR LENS OF RIGHT EYE: ICD-10-CM

## 2018-05-07 DIAGNOSIS — Z86.69 HX OF DIPLOPIA: ICD-10-CM

## 2018-05-07 DIAGNOSIS — H25.12 NUCLEAR SCLEROTIC CATARACT OF LEFT EYE: Primary | ICD-10-CM

## 2018-05-07 DIAGNOSIS — E23.0 PANHYPOPITUITARISM: ICD-10-CM

## 2018-05-07 DIAGNOSIS — Z96.1 STATUS POST CATARACT EXTRACTION AND INSERTION OF INTRAOCULAR LENS OF RIGHT EYE: ICD-10-CM

## 2018-05-07 PROCEDURE — 99999 PR PBB SHADOW E&M-EST. PATIENT-LVL II: CPT | Mod: PBBFAC,,, | Performed by: OPHTHALMOLOGY

## 2018-05-07 PROCEDURE — 92004 COMPRE OPH EXAM NEW PT 1/>: CPT | Mod: S$GLB,,, | Performed by: OPHTHALMOLOGY

## 2018-05-07 NOTE — PROGRESS NOTES
HPI     Blurred Vision    Additional comments: Cataract Eval-Self           Comments   Patient states she has been experiencing blurriness and binocular diplopia   for the past several months. She has a h/o CE OD 4yrs ago(ophthalmologist   in [facility]). She states she is interested in CE OS.    No gtts    H/o Pituitary Radiation(16y/o)  H/o Bladder Cancer       Last edited by Julio Flores on 5/7/2018 10:10 AM. (History)            Assessment /Plan     For exam results, see Encounter Report.    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 Lighting per patient  Unable to reproduce in Clinic 5/7/2018       MRI 1/2018  Evolution of a subcentimeter hemorrhagic focus in the left subinsular region, now with chronic appearing blood products at this site. No corresponding mass effect or edema. Overall constellation of findings favors a small cavernous malformation. Thrombosed aneurysm (Charcot Benson type location) possible but thought somewhat less likely.    3 additional punctate foci of susceptibility artifact in the sofia consistent with prior hemorrhage.        Plan  Dr Carlson   RTRANDOLPH Ca 6 months for cataract check  RTC sooner prn with good understanding

## 2018-05-22 ENCOUNTER — INITIAL CONSULT (OUTPATIENT)
Dept: OPHTHALMOLOGY | Facility: CLINIC | Age: 63
End: 2018-05-22
Payer: COMMERCIAL

## 2018-05-22 DIAGNOSIS — H53.2 DIPLOPIA: Primary | ICD-10-CM

## 2018-05-22 PROCEDURE — 99999 PR PBB SHADOW E&M-EST. PATIENT-LVL II: CPT | Mod: PBBFAC,,, | Performed by: OPHTHALMOLOGY

## 2018-05-22 PROCEDURE — 92012 INTRM OPH EXAM EST PATIENT: CPT | Mod: S$GLB,,, | Performed by: OPHTHALMOLOGY

## 2018-05-22 PROCEDURE — 92060 SENSORIMOTOR EXAMINATION: CPT | Mod: S$GLB,,, | Performed by: OPHTHALMOLOGY

## 2018-05-22 NOTE — PROGRESS NOTES
HPI     Pt here today for a consult for diplopia referred by Dr. Ca. Pt   states that she noticed she was seeing double in December when at a play.   It does not happen often, it only happens when looking to the right in   dimmed lighting. She seems to only experience this in the left eye.    Diplopia is side by side. When one eye is closed the diplopia resolves.     Last edited by VALENTINA Carlson Jr., MD on 5/22/2018 11:45 AM. (History)              Assessment /Plan     For exam results, see Encounter Report.    Diplopia      Ortho in all gazes  Explained symptoms. Could be a minor misalignment that was not able to bring out in clinic today or distortion, blurred vision.   Advised if diplopia continues to RTC   Continue Ocular care with Dr. Ca.     This service was scribed by Antonia Andersen for, and in the presence of Dr Carlson who personally performed this service.    Antonia Andersen, COA    Gala Carlson MD

## 2018-05-22 NOTE — LETTER
May 22, 2018      Panfilo Ca MD  [address] [facility] [address]           [facility] - Ophthalmology  [address]  [place] [address]  Phone: [phone]  Fax: [phone]          Patient: Tess Duncan   MR Number: [id number]   YOB: 1955   Date of Visit: 5/22/2018       Dear Dr. Panfilo Ca:    Thank you for referring Tess Duncan to me for evaluation. Attached you will find relevant portions of my assessment and plan of care.    If you have questions, please do not hesitate to call me. I look forward to following Tess Duncan along with you.    Sincerely,    VALENTINA Carlson Jr., MD    Enclosure  CC:  No Recipients    If you would like to receive this communication electronically, please contact [email or web address] or [phone] to request more information on [employer] Link access.    For providers and/or their staff who would like to refer a patient to [facility], please contact us through our one-stop-shop provider referral line, [facility], at [phone].    If you feel you have received this communication in error or would no longer like to receive these types of communications, please e-mail [email or web address]

## 2018-06-06 ENCOUNTER — PROCEDURE VISIT (OUTPATIENT)
Dept: UROLOGY | Facility: CLINIC | Age: 63
End: 2018-06-06
Attending: UROLOGY
Payer: COMMERCIAL

## 2018-06-06 VITALS
BODY MASS INDEX: 27.48 KG/M2 | DIASTOLIC BLOOD PRESSURE: 65 MMHG | HEART RATE: 78 BPM | WEIGHT: 140 LBS | HEIGHT: 60 IN | SYSTOLIC BLOOD PRESSURE: 113 MMHG

## 2018-06-06 DIAGNOSIS — C67.2 MALIGNANT NEOPLASM OF LATERAL WALL OF URINARY BLADDER: Primary | ICD-10-CM

## 2018-06-06 LAB
BILIRUB SERPL-MCNC: ABNORMAL MG/DL
BLOOD URINE, POC: ABNORMAL
COLOR, POC UA: ABNORMAL
GLUCOSE UR QL STRIP: NORMAL
KETONES UR QL STRIP: ABNORMAL
LEUKOCYTE ESTERASE URINE, POC: ABNORMAL
NITRITE, POC UA: ABNORMAL
PH, POC UA: 7
PROTEIN, POC: ABNORMAL
SPECIFIC GRAVITY, POC UA: 1.01
UROBILINOGEN, POC UA: NORMAL

## 2018-06-06 PROCEDURE — 81002 URINALYSIS NONAUTO W/O SCOPE: CPT | Mod: S$GLB,,, | Performed by: UROLOGY

## 2018-06-06 PROCEDURE — 52000 CYSTOURETHROSCOPY: CPT | Mod: S$GLB,,, | Performed by: UROLOGY

## 2018-06-06 RX ORDER — LIDOCAINE HYDROCHLORIDE 20 MG/ML
JELLY TOPICAL
Status: COMPLETED | OUTPATIENT
Start: 2018-06-06 | End: 2018-06-06

## 2018-06-06 RX ORDER — CIPROFLOXACIN 500 MG/1
500 TABLET ORAL
Status: COMPLETED | OUTPATIENT
Start: 2018-06-06 | End: 2018-06-06

## 2018-06-06 RX ADMIN — LIDOCAINE HYDROCHLORIDE 5 ML: 20 JELLY TOPICAL at 08:06

## 2018-06-06 RX ADMIN — CIPROFLOXACIN 500 MG: 500 TABLET ORAL at 08:06

## 2018-06-11 ENCOUNTER — TELEPHONE (OUTPATIENT)
Dept: UROLOGY | Facility: CLINIC | Age: 63
End: 2018-06-11

## 2018-06-11 NOTE — TELEPHONE ENCOUNTER
----- Message from Emelia Phillips sent at 6/11/2018  4:13 PM CDT -----  Contact: pt            Name of Who is Calling: pt      What is the request in detail: pt called to say she can switch to Wednesday at 2:30 p.m. 6/13/18      Can the clinic reply by [facility]: yes      What Number to Call Back if not in [facility]: [phone]

## 2018-06-12 NOTE — PROGRESS NOTES
"Subjective:       Patient ID: Tess Duncan is a 63 y.o. female.    Chief Complaint: No chief complaint on file.    HPI: Tess Duncan is a 63 y.o. White female who presents today for intravesical BCG therapy for treatment of her non muscle-invasive bladder cancer.    The patient has a negative history of smoking.    The patient's bladder cancer history/chronology is: Small papillary tumor removed in OR (Path: HG Ta), ReTURBT: negative (January 2018)     This is the patient's 1/3 treatments at 3 months. Has tolerated previous treatments well. Denies urinary symptoms today.      Current Outpatient Prescriptions   Medication Sig Dispense Refill    alendronate (FOSAMAX) 70 MG tablet 1 tablet on Sunday with a full glass of water and wait 30 minutes for breakfast and pills. Take sitting or standing 4 tablet 12    calcium carbonate (CORAL CALCIUM) 390 mg  (1,000 mg) Tab Take by mouth.      cyanocobalamin (VITAMIN B-12) 500 MCG tablet Take 500 mcg by mouth once daily.      dexamethasone (DECADRON) 4 mg/mL injection Inject 4 mg as directed. 3 ml syringe with 25 G 1 1/2" needle use with dispensed needle and syring for adrenal insufficiency - Injection 1 mL 0    fludrocortisone (FLORINEF) 0.1 mg Tab Take 1 tablet (100 mcg total) by mouth once daily. 30 tablet 11    levothyroxine (SYNTHROID) 88 MCG tablet Take 1 tablet (88 mcg total) by mouth once daily. 30 tablet 11    phenazopyridine (PYRIDIUM) 100 MG tablet Take 2 tablets (200 mg total) by mouth 3 (three) times daily with meals. 18 tablet 0    prasterone, dhea, (DHEA) 25 mg Cap Take 20 mg by mouth.      predniSONE (DELTASONE) 5 MG tablet TAKE 1 TABLET BY MOUTH EVERY MORNING AND 1/2 TABLET EVERY EVENING 48 tablet 6    UNABLE TO FIND Take by mouth once daily.  MG       UNABLE TO FIND Take by mouth once daily. TUMERIC       vitamin D 1000 units Tab Take 185 mg by mouth once daily.       Current Facility-Administered Medications   Medication Dose Route " "Frequency Provider Last Rate Last Dose    BCG live (MOY) 50 mg in sodium chloride 0.9% 50 mL bladder instillation  50 mg Bladder Instillation 1 time in Clinic/HOD Whitney Gee MD           Past Medical History:   Diagnosis Date    Adrenal insufficiency ([place]'s disease)     Arthritis     Cancer     Hemangioma of liver     History of hematuria     Hyperlipidemia     Hypopituitarism     Hypothyroidism     Osteopenia     Osteoporosis     started alendronate - Feb 2017, was on alendronate "many" years ago x 1-2 years    Panhypopituitarism     Recurrent UTI     Renal cyst     Scoliosis        Past Surgical History:   Procedure Laterality Date    ADRENAL GLAND SURGERY      APPENDECTOMY      BLADDER SURGERY      CATARACT EXTRACTION      EYE SURGERY      cataract    HYSTERECTOMY      LASER LAPAROSCOPY      THYROID SURGERY       Review of Systems    Review of Systems   Constitutional: Negative for fever, chills, activity change, appetite change and unexpected weight change.   Respiratory: Negative for cough, choking, chest tightness and shortness of breath.    Cardiovascular: Negative for chest pain, palpitations and leg swelling.   Gastrointestinal: Negative for nausea, vomiting, abdominal pain, diarrhea, blood in stool, abdominal distention and anal bleeding.  Genitourinary: As documented per HPI   Musculoskeletal: Negative for myalgias, gait problem, neck pain and neck stiffness.   Skin: Negative for color change, pallor, rash and wound.   Psychiatric/Behavioral: Negative for hallucinations, behavioral problems, self-injury and agitation. The patient is not hyperactive.      All other systems were reviewed and were negative.    Objective:     Vitals:    06/13/18 1416   BP: 103/69   Pulse: 84     Physical Exam   Vitals reviewed.  Constitutional: She is oriented to person, place, and time. She appears well-developed and well-nourished. No distress.   Cardiovascular: Normal heart sounds and " intact distal pulses.   Pulmonary/Chest: Effort normal and breath sounds normal.   Abdominal: Soft. Bowel sounds are normal. She exhibits no distension. There is no tenderness.  Musculoskeletal: Normal range of motion. She exhibits no edema.   Neurological: She is alert and oriented to person, place, and time. Coordination normal.   Skin: Skin is warm and dry. She is not diaphoretic.     Lab Results   Component Value Date    CREATININE 0.8 03/07/2018     Lab Results   Component Value Date    EGFRNONAA >60.0 03/07/2018     Lab Results   Component Value Date    ESTGFRAFRICA >60.0 03/07/2018     UA: positive for leukocytes (trace), protein (trace), RBCs (5-10 tre/mL)     BCG mixed per protocol, pt catheterized per sterile technique.  BCG instilled into bladder by ISABEL Grey     Assessment:    Malignant neoplasm of lateral wall of urinary bladder     Plan:   Diagnoses and all orders for this visit:    Malignant neoplasm of lateral wall of urinary bladder    Plan:   --BCG today  --Follow up in 1 week for BCG

## 2018-06-13 ENCOUNTER — OFFICE VISIT (OUTPATIENT)
Dept: UROLOGY | Facility: CLINIC | Age: 63
End: 2018-06-13
Payer: COMMERCIAL

## 2018-06-13 VITALS
WEIGHT: 140 LBS | DIASTOLIC BLOOD PRESSURE: 69 MMHG | HEIGHT: 60 IN | HEART RATE: 84 BPM | BODY MASS INDEX: 27.48 KG/M2 | SYSTOLIC BLOOD PRESSURE: 103 MMHG

## 2018-06-13 DIAGNOSIS — C67.2 MALIGNANT NEOPLASM OF LATERAL WALL OF URINARY BLADDER: Primary | ICD-10-CM

## 2018-06-13 LAB
BILIRUB SERPL-MCNC: ABNORMAL MG/DL
BLOOD URINE, POC: ABNORMAL
COLOR, POC UA: YELLOW
GLUCOSE UR QL STRIP: ABNORMAL
KETONES UR QL STRIP: ABNORMAL
LEUKOCYTE ESTERASE URINE, POC: ABNORMAL
NITRITE, POC UA: ABNORMAL
PH, POC UA: 6
PROTEIN, POC: ABNORMAL
SPECIFIC GRAVITY, POC UA: 1
UROBILINOGEN, POC UA: ABNORMAL

## 2018-06-13 PROCEDURE — 96372 THER/PROPH/DIAG INJ SC/IM: CPT | Mod: S$GLB,,, | Performed by: NURSE PRACTITIONER

## 2018-06-13 PROCEDURE — 99213 OFFICE O/P EST LOW 20 MIN: CPT | Mod: 25,S$GLB,, | Performed by: NURSE PRACTITIONER

## 2018-06-13 PROCEDURE — 81002 URINALYSIS NONAUTO W/O SCOPE: CPT | Mod: S$GLB,,, | Performed by: NURSE PRACTITIONER

## 2018-06-13 NOTE — LETTER
June 13, 2018      Whitney Gee MD  [address] [place] [address]  [facility] [address]           [facility] - Urology  [address] [place] [address]  Phone: [phone]  Fax: [phone]          Patient: Tess Duncan   MR Number: [id number]   YOB: 1955   Date of Visit: 6/13/2018       Dear Dr. Whitney Gee:    Thank you for referring Tess Duncan to me for evaluation. Attached you will find relevant portions of my assessment and plan of care.    If you have questions, please do not hesitate to call me. I look forward to following Tess Duncan along with you.    Sincerely,    Ching Rico, NP    Enclosure  CC:  No Recipients    If you would like to receive this communication electronically, please contact [email or web address] or [phone] to request more information on [employer] Link access.    For providers and/or their staff who would like to refer a patient to [facility], please contact us through our one-stop-shop provider referral line, [facility], at [phone].    If you feel you have received this communication in error or would no longer like to receive these types of communications, please e-mail [email or web address]

## 2018-06-21 ENCOUNTER — OFFICE VISIT (OUTPATIENT)
Dept: UROLOGY | Facility: CLINIC | Age: 63
End: 2018-06-21
Payer: COMMERCIAL

## 2018-06-21 VITALS
DIASTOLIC BLOOD PRESSURE: 73 MMHG | HEIGHT: 60 IN | BODY MASS INDEX: 27.48 KG/M2 | HEART RATE: 91 BPM | SYSTOLIC BLOOD PRESSURE: 107 MMHG | WEIGHT: 140 LBS

## 2018-06-21 DIAGNOSIS — C67.2 MALIGNANT NEOPLASM OF LATERAL WALL OF URINARY BLADDER: Primary | ICD-10-CM

## 2018-06-21 LAB
BILIRUB SERPL-MCNC: ABNORMAL MG/DL
BLOOD URINE, POC: ABNORMAL
COLOR, POC UA: ABNORMAL
GLUCOSE UR QL STRIP: ABNORMAL
KETONES UR QL STRIP: ABNORMAL
LEUKOCYTE ESTERASE URINE, POC: ABNORMAL
NITRITE, POC UA: ABNORMAL
PH, POC UA: 5
PROTEIN, POC: ABNORMAL
SPECIFIC GRAVITY, POC UA: 1
UROBILINOGEN, POC UA: ABNORMAL

## 2018-06-21 PROCEDURE — 81002 URINALYSIS NONAUTO W/O SCOPE: CPT | Mod: S$GLB,,, | Performed by: NURSE PRACTITIONER

## 2018-06-21 PROCEDURE — 99213 OFFICE O/P EST LOW 20 MIN: CPT | Mod: 25,S$GLB,, | Performed by: NURSE PRACTITIONER

## 2018-06-21 PROCEDURE — 51720 TREATMENT OF BLADDER LESION: CPT | Mod: S$GLB,,, | Performed by: NURSE PRACTITIONER

## 2018-06-21 NOTE — PROGRESS NOTES
"Subjective:       Patient ID: Tess Duncan is a 63 y.o. female.    Chief Complaint: Establish Care (bcg x3)    HPI: Tess Duncan is a 63 y.o. White female who presents today for intravesical BCG therapy for treatment of her non muscle-invasive bladder cancer.    The patient has a negative history of smoking.    The patient's bladder cancer history/chronology is: Small papillary tumor removed in OR (Path: [facility]), ReTURBT: negative (January 2018)     This is the patient's 2/3 treatments at 3 months. Has tolerated previous treatments well. Denies urinary symptoms today.      Current Outpatient Prescriptions   Medication Sig Dispense Refill    alendronate (FOSAMAX) 70 MG tablet 1 tablet on Sunday with a full glass of water and wait 30 minutes for breakfast and pills. Take sitting or standing 4 tablet 12    calcium carbonate (CORAL CALCIUM) 390 mg  (1,000 mg) Tab Take by mouth.      cyanocobalamin (VITAMIN B-12) 500 MCG tablet Take 500 mcg by mouth once daily.      dexamethasone (DECADRON) 4 mg/mL injection Inject 4 mg as directed. 3 ml syringe with 25 G 1 1/2" needle use with dispensed needle and syring for adrenal insufficiency - Injection 1 mL 0    fludrocortisone (FLORINEF) 0.1 mg Tab Take 1 tablet (100 mcg total) by mouth once daily. 30 tablet 11    levothyroxine (SYNTHROID) 88 MCG tablet Take 1 tablet (88 mcg total) by mouth once daily. 30 tablet 11    phenazopyridine (PYRIDIUM) 100 MG tablet Take 2 tablets (200 mg total) by mouth 3 (three) times daily with meals. 18 tablet 0    prasterone, dhea, (DHEA) 25 mg Cap Take 20 mg by mouth.      predniSONE (DELTASONE) 5 MG tablet TAKE 1 TABLET BY MOUTH EVERY MORNING AND 1/2 TABLET EVERY EVENING 48 tablet 6    UNABLE TO FIND Take by mouth once daily.  MG       UNABLE TO FIND Take by mouth once daily. TUMERIC       vitamin D 1000 units Tab Take 185 mg by mouth once daily.       Current Facility-Administered Medications   Medication Dose Route " "Frequency Provider Last Rate Last Dose    BCG live (MOY) 50 mg in sodium chloride 0.9% 50 mL bladder instillation  50 mg Bladder Instillation 1 time in Clinic/HOD Whitney Gee MD           Past Medical History:   Diagnosis Date    Adrenal insufficiency (Kranthi's disease)     Arthritis     Cancer     Hemangioma of liver     History of hematuria     Hyperlipidemia     Hypopituitarism     Hypothyroidism     Osteopenia     Osteoporosis     started alendronate - Feb 2017, was on alendronate "many" years ago x 1-2 years    Panhypopituitarism     Recurrent UTI     Renal cyst     Scoliosis        Past Surgical History:   Procedure Laterality Date    ADRENAL GLAND SURGERY      APPENDECTOMY      BLADDER SURGERY      CATARACT EXTRACTION      EYE SURGERY      cataract    HYSTERECTOMY      LASER LAPAROSCOPY      THYROID SURGERY       Review of Systems    Review of Systems   Constitutional: Negative for fever, chills, activity change, appetite change and unexpected weight change.   Respiratory: Negative for cough, choking, chest tightness and shortness of breath.    Cardiovascular: Negative for chest pain, palpitations and leg swelling.   Gastrointestinal: Negative for nausea, vomiting, abdominal pain, diarrhea, blood in stool, abdominal distention and anal bleeding.  Genitourinary: As documented per HPI   Musculoskeletal: Negative for myalgias, gait problem, neck pain and neck stiffness.   Skin: Negative for color change, pallor, rash and wound.   Psychiatric/Behavioral: Negative for hallucinations, behavioral problems, self-injury and agitation. The patient is not hyperactive.      All other systems were reviewed and were negative.    Objective:     Vitals:    06/21/18 1424   BP: 107/73   Pulse: 91     Physical Exam   Vitals reviewed.  Constitutional: She is oriented to person, place, and time. She appears well-developed and well-nourished. No distress.   Cardiovascular: Normal heart sounds and " intact distal pulses.   Pulmonary/Chest: Effort normal and breath sounds normal.   Abdominal: Soft. Bowel sounds are normal. She exhibits no distension. There is no tenderness.  Musculoskeletal: Normal range of motion. She exhibits no edema.   Neurological: She is alert and oriented to person, place, and time. Coordination normal.   Skin: Skin is warm and dry. She is not diaphoretic.     Lab Results   Component Value Date    CREATININE 0.8 03/07/2018     Lab Results   Component Value Date    EGFRNONAA >60.0 03/07/2018     Lab Results   Component Value Date    ESTGFRAFRICA >60.0 03/07/2018     UA: positive for leukocytes (+)     BCG mixed per protocol, pt catheterized per sterile technique.  BCG instilled into bladder by ISABEL Grey     Assessment:    Malignant neoplasm of lateral wall of urinary bladder     Plan:   Tess was seen today for establish care.    Diagnoses and all orders for this visit:    Malignant neoplasm of lateral wall of urinary bladder    Other orders  -     POCT URINE DIPSTICK WITHOUT MICROSCOPE    Plan:   --BCG today   --Follow up in 1 week for BCG

## 2018-06-26 NOTE — PROGRESS NOTES
"Subjective:       Patient ID: Tess Duncan is a 63 y.o. female.    Chief Complaint: No chief complaint on file.    HPI: Tess Duncan is a 63 y.o. White female who presents today for intravesical BCG therapy for treatment of her non muscle-invasive bladder cancer.    The patient has a negative history of smoking.    The patient's bladder cancer history/chronology is: Small papillary tumor removed in OR (Path: [facility]), ReTURBT: negative (January 2018)     This is the patient's 3/3 treatments at 3 months. Has tolerated previous treatments well. Denies urinary symptoms today.      Current Outpatient Prescriptions   Medication Sig Dispense Refill    alendronate (FOSAMAX) 70 MG tablet 1 tablet on Sunday with a full glass of water and wait 30 minutes for breakfast and pills. Take sitting or standing 4 tablet 12    calcium carbonate (CORAL CALCIUM) 390 mg  (1,000 mg) Tab Take by mouth.      cyanocobalamin (VITAMIN B-12) 500 MCG tablet Take 500 mcg by mouth once daily.      dexamethasone (DECADRON) 4 mg/mL injection Inject 4 mg as directed. 3 ml syringe with 25 G 1 1/2" needle use with dispensed needle and syring for adrenal insufficiency - Injection 1 mL 0    fludrocortisone (FLORINEF) 0.1 mg Tab Take 1 tablet (100 mcg total) by mouth once daily. 30 tablet 11    levothyroxine (SYNTHROID) 88 MCG tablet Take 1 tablet (88 mcg total) by mouth once daily. 30 tablet 11    phenazopyridine (PYRIDIUM) 100 MG tablet Take 2 tablets (200 mg total) by mouth 3 (three) times daily with meals. 18 tablet 0    prasterone, dhea, (DHEA) 25 mg Cap Take 20 mg by mouth.      predniSONE (DELTASONE) 5 MG tablet TAKE 1 TABLET BY MOUTH EVERY MORNING AND 1/2 TABLET EVERY EVENING 48 tablet 6    UNABLE TO FIND Take by mouth once daily.  MG       UNABLE TO FIND Take by mouth once daily. TUMERIC       vitamin D 1000 units Tab Take 185 mg by mouth once daily.       Current Facility-Administered Medications   Medication Dose Route " "Frequency Provider Last Rate Last Dose    BCG live (MOY) 50 mg in sodium chloride 0.9% 50 mL bladder instillation  50 mg Bladder Instillation 1 time in Clinic/HOD Whitney Gee MD           Past Medical History:   Diagnosis Date    Adrenal insufficiency (Kranthi's disease)     Arthritis     Cancer     Hemangioma of liver     History of hematuria     Hyperlipidemia     Hypopituitarism     Hypothyroidism     Osteopenia     Osteoporosis     started alendronate - Feb 2017, was on alendronate "many" years ago x 1-2 years    Panhypopituitarism     Recurrent UTI     Renal cyst     Scoliosis        Past Surgical History:   Procedure Laterality Date    ADRENAL GLAND SURGERY      APPENDECTOMY      BLADDER SURGERY      CATARACT EXTRACTION      EYE SURGERY      cataract    HYSTERECTOMY      LASER LAPAROSCOPY      THYROID SURGERY       Review of Systems    Review of Systems   Constitutional: Negative for fever, chills, activity change, appetite change and unexpected weight change.   Respiratory: Negative for cough, choking, chest tightness and shortness of breath.    Cardiovascular: Negative for chest pain, palpitations and leg swelling.   Gastrointestinal: Negative for nausea, vomiting, abdominal pain, diarrhea, blood in stool, abdominal distention and anal bleeding.  Genitourinary: As documented per HPI   Musculoskeletal: Negative for myalgias, gait problem, neck pain and neck stiffness.   Skin: Negative for color change, pallor, rash and wound.   Psychiatric/Behavioral: Negative for hallucinations, behavioral problems, self-injury and agitation. The patient is not hyperactive.      All other systems were reviewed and were negative.    Objective:     Vitals:    06/27/18 1417   BP: 113/70   Pulse: 80     Physical Exam   Vitals reviewed.  Constitutional: She is oriented to person, place, and time. She appears well-developed and well-nourished. No distress.   Cardiovascular: Normal heart sounds and " intact distal pulses.   Pulmonary/Chest: Effort normal and breath sounds normal.   Abdominal: Soft. Bowel sounds are normal. She exhibits no distension. There is no tenderness.  Musculoskeletal: Normal range of motion. She exhibits no edema.   Neurological: She is alert and oriented to person, place, and time. Coordination normal.   Skin: Skin is warm and dry. She is not diaphoretic.     Lab Results   Component Value Date    CREATININE 0.8 03/07/2018     Lab Results   Component Value Date    EGFRNONAA >60.0 03/07/2018     Lab Results   Component Value Date    ESTGFRAFRICA >60.0 03/07/2018     UA: positive for leukocytes (+)     BCG mixed per protocol, pt catheterized per sterile technique.  BCG instilled into bladder by COLLIN Ellis     Assessment:    Malignant neoplasm of lateral wall of urinary bladder     Plan:   Diagnoses and all orders for this visit:    Malignant neoplasm of lateral wall of urinary bladder    Plan:   --BCG today   --Follow up with Dr. Gee in 3 months for cysto and cytology

## 2018-06-27 ENCOUNTER — OFFICE VISIT (OUTPATIENT)
Dept: UROLOGY | Facility: CLINIC | Age: 63
End: 2018-06-27
Payer: COMMERCIAL

## 2018-06-27 VITALS
BODY MASS INDEX: 27.29 KG/M2 | DIASTOLIC BLOOD PRESSURE: 70 MMHG | WEIGHT: 139 LBS | HEIGHT: 60 IN | SYSTOLIC BLOOD PRESSURE: 113 MMHG | HEART RATE: 80 BPM

## 2018-06-27 DIAGNOSIS — C67.2 MALIGNANT NEOPLASM OF LATERAL WALL OF URINARY BLADDER: Primary | ICD-10-CM

## 2018-06-27 PROCEDURE — 99213 OFFICE O/P EST LOW 20 MIN: CPT | Mod: 25,S$GLB,, | Performed by: NURSE PRACTITIONER

## 2018-06-27 PROCEDURE — 81002 URINALYSIS NONAUTO W/O SCOPE: CPT | Mod: S$GLB,,, | Performed by: NURSE PRACTITIONER

## 2018-06-27 PROCEDURE — 51720 TREATMENT OF BLADDER LESION: CPT | Mod: S$GLB,,, | Performed by: NURSE PRACTITIONER

## 2018-06-27 NOTE — LETTER
June 27, 2018      Whitney Gee MD  [address] [place] [address] [address]  [facility] [address]           [facility]  [address] [place] [id number]  Phone: [phone]  Fax: [phone]          Patient: Tess Duncan   MR Number: [id number]   YOB: 1955   Date of Visit: 6/27/2018       Dear Dr. Whitney Gee:    Thank you for referring Tess Duncan to me for evaluation. Attached you will find relevant portions of my assessment and plan of care.    If you have questions, please do not hesitate to call me. I look forward to following Tess Duncan along with you.    Sincerely,    Ching Rico, NP    Enclosure  CC:  No Recipients    If you would like to receive this communication electronically, please contact [email or web address] or [phone] to request more information on [employer] access.    For providers and/or their staff who would like to refer a patient to [facility], please contact us through our one-stop-shop provider referral line, [facility], at [phone].    If you feel you have received this communication in error or would no longer like to receive these types of communications, please e-mail [email or web address]

## 2018-06-28 LAB
BILIRUB SERPL-MCNC: ABNORMAL MG/DL
BLOOD URINE, POC: ABNORMAL
COLOR, POC UA: ABNORMAL
GLUCOSE UR QL STRIP: NORMAL
KETONES UR QL STRIP: ABNORMAL
LEUKOCYTE ESTERASE URINE, POC: ABNORMAL
NITRITE, POC UA: ABNORMAL
PH, POC UA: 7
PROTEIN, POC: ABNORMAL
SPECIFIC GRAVITY, POC UA: 1
UROBILINOGEN, POC UA: NORMAL

## 2018-09-12 ENCOUNTER — PROCEDURE VISIT (OUTPATIENT)
Dept: UROLOGY | Facility: CLINIC | Age: 63
End: 2018-09-12
Attending: UROLOGY
Payer: COMMERCIAL

## 2018-09-12 VITALS
WEIGHT: 139 LBS | SYSTOLIC BLOOD PRESSURE: 116 MMHG | DIASTOLIC BLOOD PRESSURE: 73 MMHG | HEIGHT: 60 IN | BODY MASS INDEX: 27.29 KG/M2 | HEART RATE: 81 BPM

## 2018-09-12 DIAGNOSIS — C67.2 MALIGNANT NEOPLASM OF LATERAL WALL OF URINARY BLADDER: Primary | ICD-10-CM

## 2018-09-12 PROCEDURE — 52000 CYSTOURETHROSCOPY: CPT | Mod: S$GLB,,, | Performed by: UROLOGY

## 2018-09-12 PROCEDURE — 81002 URINALYSIS NONAUTO W/O SCOPE: CPT | Mod: S$GLB,,, | Performed by: UROLOGY

## 2018-09-12 RX ORDER — SULFAMETHOXAZOLE AND TRIMETHOPRIM 800; 160 MG/1; MG/1
1 TABLET ORAL
Status: COMPLETED | OUTPATIENT
Start: 2018-09-12 | End: 2018-09-12

## 2018-09-12 RX ORDER — LIDOCAINE HYDROCHLORIDE 20 MG/ML
JELLY TOPICAL
Status: COMPLETED | OUTPATIENT
Start: 2018-09-12 | End: 2018-09-12

## 2018-09-12 RX ADMIN — SULFAMETHOXAZOLE AND TRIMETHOPRIM 1 TABLET: 800; 160 TABLET ORAL at 08:09

## 2018-09-12 RX ADMIN — LIDOCAINE HYDROCHLORIDE 5 ML: 20 JELLY TOPICAL at 08:09

## 2018-09-12 NOTE — PROCEDURES
"Cystoscopy  Date/Time: 9/12/2018 8:28 AM  Performed by: Whitney Gee MD  Authorized by: Whitney Gee MD     Consent Done?:  Yes (Written)  Time out: Immediately prior to procedure a "time out" was called to verify the correct patient, procedure, equipment, support staff and site/side marked as required.    Indications: history bladder cancer    Position:  Dorsal lithotomy  Anesthesia:  Lidocaine jelly  Patient sedated?: No    Preparation: Patient was prepped and draped in usual sterile fashion      Scope type:  Flexible cystoscope  Stent inserted: No    Stent removed: No    External exam normal: Yes    Digital exam performed: No    Urethra normal: Yes  Bladder neck normal: Bladder neck normal   Bladder normal: Yes      Patient tolerance:  Patient tolerated the procedure well with no immediate complications     Normal cysto. Month 6 BCG x 3 weeks to start soon.  Cysto q3mths      "

## 2018-09-20 ENCOUNTER — CLINICAL SUPPORT (OUTPATIENT)
Dept: UROLOGY | Facility: CLINIC | Age: 63
End: 2018-09-20
Payer: COMMERCIAL

## 2018-09-20 VITALS
BODY MASS INDEX: 27.09 KG/M2 | HEIGHT: 60 IN | DIASTOLIC BLOOD PRESSURE: 68 MMHG | WEIGHT: 138 LBS | HEART RATE: 77 BPM | SYSTOLIC BLOOD PRESSURE: 118 MMHG

## 2018-09-20 DIAGNOSIS — C67.2 MALIGNANT NEOPLASM OF LATERAL WALL OF URINARY BLADDER: ICD-10-CM

## 2018-09-20 LAB
BILIRUB SERPL-MCNC: NORMAL MG/DL
BLOOD URINE, POC: NORMAL
COLOR, POC UA: NORMAL
GLUCOSE UR QL STRIP: NORMAL
KETONES UR QL STRIP: NORMAL
LEUKOCYTE ESTERASE URINE, POC: NORMAL
NITRITE, POC UA: NORMAL
PH, POC UA: 8
PROTEIN, POC: NORMAL
SPECIFIC GRAVITY, POC UA: 1
UROBILINOGEN, POC UA: NORMAL

## 2018-09-20 PROCEDURE — 51720 TREATMENT OF BLADDER LESION: CPT | Mod: S$GLB,,, | Performed by: UROLOGY

## 2018-09-20 PROCEDURE — 81002 URINALYSIS NONAUTO W/O SCOPE: CPT | Mod: S$GLB,,, | Performed by: UROLOGY

## 2018-09-20 NOTE — PROGRESS NOTES
Pt here today for BCG #1 of 3.  Urine negative for nitrites, pt denies gross hematuria.  No urinary symptoms.  BCG mixed per protocol, pt catheterized per sterile technique.  BCG instilled into bladder.

## 2018-09-25 NOTE — PROGRESS NOTES
"Subjective:       Patient ID: Tess Duncan is a 63 y.o. female.    Chief Complaint: No chief complaint on file.    HPI: Tess Duncan is a 63 y.o. White female who presents today for intravesical BCG therapy for treatment of her non muscle-invasive bladder cancer.    The patient has a negative history of smoking.    The patient's bladder cancer history/chronology is: Small papillary tumor removed in OR (Path: [facility] Ta), ReTURBT: negative (January 2018)     This is the patient's 1/3 treatments at 6 months. Has tolerated previous treatments well. Denies urinary symptoms today.      Current Outpatient Medications   Medication Sig Dispense Refill    alendronate (FOSAMAX) 70 MG tablet 1 tablet on Sunday with a full glass of water and wait 30 minutes for breakfast and pills. Take sitting or standing 4 tablet 12    calcium carbonate (CORAL CALCIUM) 390 mg  (1,000 mg) Tab Take by mouth.      cyanocobalamin (VITAMIN B-12) 500 MCG tablet Take 500 mcg by mouth once daily.      dexamethasone (DECADRON) 4 mg/mL injection Inject 4 mg as directed. 3 ml syringe with 25 G 1 1/2" needle use with dispensed needle and syring for adrenal insufficiency - Injection 1 mL 0    fludrocortisone (FLORINEF) 0.1 mg Tab Take 1 tablet (100 mcg total) by mouth once daily. 30 tablet 11    levothyroxine (SYNTHROID) 88 MCG tablet Take 1 tablet (88 mcg total) by mouth once daily. 30 tablet 11    phenazopyridine (PYRIDIUM) 100 MG tablet Take 2 tablets (200 mg total) by mouth 3 (three) times daily with meals. 18 tablet 0    prasterone, dhea, (DHEA) 25 mg Cap Take 20 mg by mouth.      predniSONE (DELTASONE) 5 MG tablet TAKE 1 TABLET BY MOUTH EVERY MORNING AND 1/2 TABLET EVERY EVENING 48 tablet 6    UNABLE TO FIND Take by mouth once daily.  MG       UNABLE TO FIND Take by mouth once daily. TUMERIC       vitamin D 1000 units Tab Take 185 mg by mouth once daily.       Current Facility-Administered Medications   Medication Dose Route " "Frequency Provider Last Rate Last Dose    BCG live (MOY) 50 mg in sodium chloride 0.9% 50 mL bladder instillation  50 mg Bladder Instillation Q7 Days Ching Tavareseau, NP   50 mg at 09/20/18 1432       Past Medical History:   Diagnosis Date    Adrenal insufficiency (Kranthi's disease)     Arthritis     Cancer     Hemangioma of liver     History of hematuria     Hyperlipidemia     Hypopituitarism     Hypothyroidism     Osteopenia     Osteoporosis     started alendronate - Feb 2017, was on alendronate "many" years ago x 1-2 years    Panhypopituitarism     Recurrent UTI     Renal cyst     Scoliosis        Past Surgical History:   Procedure Laterality Date    ADRENAL GLAND SURGERY      APPENDECTOMY      BIOPSY-BLADDER, cystoscopy, retrograde pyelogram Bilateral 11/29/2017    [facility] at [facility] OR    BLADDER SURGERY      CATARACT EXTRACTION      CYSTOSCOPY W/BLADDER BIOPSY FULGURATION-BLADDER N/A 1/10/2018    Performed by [facility] MD at [facility] OR    EYE SURGERY      cataract    HYSTERECTOMY      LASER LAPAROSCOPY      THYROID SURGERY       Review of Systems    Review of Systems   Constitutional: Negative for fever, chills, activity change, appetite change and unexpected weight change.   Respiratory: Negative for cough, choking, chest tightness and shortness of breath.    Cardiovascular: Negative for chest pain, palpitations and leg swelling.   Gastrointestinal: Negative for nausea, vomiting, abdominal pain, diarrhea, blood in stool, abdominal distention and anal bleeding.  Genitourinary: As documented per HPI   Musculoskeletal: Negative for myalgias, gait problem, neck pain and neck stiffness.   Skin: Negative for color change, pallor, rash and wound.   Psychiatric/Behavioral: Negative for hallucinations, behavioral problems, self-injury and agitation. The patient is not hyperactive.      All other systems were reviewed and were negative.    Objective: "     Vitals:    09/27/18 1422   BP: 114/73   Pulse: 71     Physical Exam   Vitals reviewed.  Constitutional: She is oriented to person, place, and time. She appears well-developed and well-nourished. No distress.   Cardiovascular: Normal heart sounds and intact distal pulses.   Pulmonary/Chest: Effort normal and breath sounds normal.   Abdominal: Soft. Bowel sounds are normal. She exhibits no distension. There is no tenderness.  Musculoskeletal: Normal range of motion. She exhibits no edema.   Neurological: She is alert and oriented to person, place, and time. Coordination normal.   Skin: Skin is warm and dry. She is not diaphoretic.     Lab Results   Component Value Date    CREATININE 0.8 03/07/2018     Lab Results   Component Value Date    EGFRNONAA >60.0 03/07/2018     Lab Results   Component Value Date    ESTGFRAFRICA >60.0 03/07/2018     UA: positive for leukocytes (trace), RBcs (5-10 tre/mL)     BCG mixed per protocol, pt catheterized per sterile technique.  BCG instilled into bladder by COLLIN Ellis     Assessment:    Malignant neoplasm of lateral wall of urinary bladder     Plan:   Diagnoses and all orders for this visit:    Malignant neoplasm of lateral wall of urinary bladder    Plan:   --BCG today   --Follow up in 1 week for BCG

## 2018-09-27 ENCOUNTER — OFFICE VISIT (OUTPATIENT)
Dept: UROLOGY | Facility: CLINIC | Age: 63
End: 2018-09-27
Payer: COMMERCIAL

## 2018-09-27 VITALS
BODY MASS INDEX: 27.09 KG/M2 | HEART RATE: 71 BPM | HEIGHT: 60 IN | SYSTOLIC BLOOD PRESSURE: 114 MMHG | DIASTOLIC BLOOD PRESSURE: 73 MMHG | WEIGHT: 138 LBS

## 2018-09-27 DIAGNOSIS — C67.2 MALIGNANT NEOPLASM OF LATERAL WALL OF URINARY BLADDER: Primary | ICD-10-CM

## 2018-09-27 PROCEDURE — 81002 URINALYSIS NONAUTO W/O SCOPE: CPT | Mod: S$GLB,,, | Performed by: NURSE PRACTITIONER

## 2018-09-27 PROCEDURE — 51720 TREATMENT OF BLADDER LESION: CPT | Mod: S$GLB,,, | Performed by: NURSE PRACTITIONER

## 2018-09-27 PROCEDURE — 99213 OFFICE O/P EST LOW 20 MIN: CPT | Mod: 25,S$GLB,, | Performed by: NURSE PRACTITIONER

## 2018-10-04 ENCOUNTER — CLINICAL SUPPORT (OUTPATIENT)
Dept: UROLOGY | Facility: CLINIC | Age: 63
End: 2018-10-04
Payer: COMMERCIAL

## 2018-10-04 VITALS
HEIGHT: 60 IN | HEART RATE: 69 BPM | DIASTOLIC BLOOD PRESSURE: 55 MMHG | WEIGHT: 138 LBS | BODY MASS INDEX: 27.09 KG/M2 | SYSTOLIC BLOOD PRESSURE: 114 MMHG

## 2018-10-04 DIAGNOSIS — C67.9 MALIGNANT NEOPLASM OF URINARY BLADDER, UNSPECIFIED SITE: Primary | ICD-10-CM

## 2018-10-04 PROCEDURE — 81002 URINALYSIS NONAUTO W/O SCOPE: CPT | Mod: S$GLB,,, | Performed by: UROLOGY

## 2018-10-04 PROCEDURE — 51720 TREATMENT OF BLADDER LESION: CPT | Mod: S$GLB,,, | Performed by: UROLOGY

## 2018-10-04 NOTE — PROGRESS NOTES
Pt here today for BCG #3 of 3.  Urine negative for nitrites, pt denies gross hematuria.  No urinary symptoms.  BCG mixed per protocol, pt catheterized per sterile technique.  BCG instilled into bladder.

## 2018-11-12 ENCOUNTER — TELEPHONE (OUTPATIENT)
Dept: OPHTHALMOLOGY | Facility: CLINIC | Age: 63
End: 2018-11-12

## 2018-11-12 NOTE — TELEPHONE ENCOUNTER
11/12/18  Suki returned pt call and after response from Dr. Carlson, I have scheduled pt with Dr. Arnulfo MD. 2:29p st         ----- Message from VALENTINA Carlson Jr., MD sent at 11/12/2018  1:31 PM CST -----  Contact: Tess  Lulu, refer to Arnulfo  ----- Message -----  From: Suki Young MA  Sent: 11/12/2018  11:20 AM  To: VALENTINA Carlson Jr., MD    Would you like me to schedule this pt. Please review your last note from May. Thanks Suki  ----- Message -----  From: Marisabel Mckeon  Sent: 11/12/2018  10:47 AM  To: Aldo Cedeño Staff    Ms. Duncan would like to schedule a follow-up appointment. She can be reached at [phone]. She is having double vision and weird focus issues.

## 2018-11-12 NOTE — TELEPHONE ENCOUNTER
11/12/18  Suki has forwarded message to Dr. Carlson for review. I am awaiting response for okay to schedule. st 11:23a      ----- Message from Marisabel Mckeon sent at 11/12/2018 10:47 AM CST -----  Contact: Tess  Ms. Duncan would like to schedule a follow-up appointment. She can be reached at [phone]. She is having double vision and weird focus issues.

## 2018-11-13 ENCOUNTER — PATIENT MESSAGE (OUTPATIENT)
Dept: INTERNAL MEDICINE | Facility: CLINIC | Age: 63
End: 2018-11-13

## 2018-11-13 DIAGNOSIS — E03.8 OTHER SPECIFIED HYPOTHYROIDISM: ICD-10-CM

## 2018-11-13 DIAGNOSIS — R74.01 TRANSAMINITIS: ICD-10-CM

## 2018-11-13 DIAGNOSIS — Z00.00 ANNUAL PHYSICAL EXAM: Primary | ICD-10-CM

## 2018-11-13 NOTE — TELEPHONE ENCOUNTER
Please arrange labs on Monday per pt preference - tsh, free t4, iron, tibc, hep function panel

## 2018-11-14 ENCOUNTER — PATIENT MESSAGE (OUTPATIENT)
Dept: INTERNAL MEDICINE | Facility: CLINIC | Age: 63
End: 2018-11-14

## 2018-11-14 NOTE — TELEPHONE ENCOUNTER
Sent message to pt to find out where she has her lab work done. The labs that Dr. Crawley ordered are still in the computer not scheduled

## 2018-11-14 NOTE — TELEPHONE ENCOUNTER
Reviewed labs provided by pt - please arrange tsh, free t4, ferritin, tibc, hepatic function panel prior to upcoming appt per pt request - thanks!

## 2018-11-15 ENCOUNTER — PATIENT MESSAGE (OUTPATIENT)
Dept: INTERNAL MEDICINE | Facility: CLINIC | Age: 63
End: 2018-11-15

## 2018-11-19 ENCOUNTER — OFFICE VISIT (OUTPATIENT)
Dept: OPTOMETRY | Facility: CLINIC | Age: 63
End: 2018-11-19
Payer: COMMERCIAL

## 2018-11-19 ENCOUNTER — LAB VISIT (OUTPATIENT)
Dept: LAB | Facility: HOSPITAL | Age: 63
End: 2018-11-19
Attending: INTERNAL MEDICINE
Payer: COMMERCIAL

## 2018-11-19 DIAGNOSIS — H51.9 IMBALANCE OF MUSCLE OF EYE: ICD-10-CM

## 2018-11-19 DIAGNOSIS — Z98.41 STATUS POST CATARACT SURGERY, RIGHT: ICD-10-CM

## 2018-11-19 DIAGNOSIS — Z96.1 PSEUDOPHAKIA OF RIGHT EYE: ICD-10-CM

## 2018-11-19 DIAGNOSIS — H25.12 NUCLEAR SCLEROTIC CATARACT OF LEFT EYE: Primary | ICD-10-CM

## 2018-11-19 DIAGNOSIS — R74.01 TRANSAMINITIS: ICD-10-CM

## 2018-11-19 DIAGNOSIS — E27.1 ADRENAL INSUFFICIENCY (ADDISON'S DISEASE): ICD-10-CM

## 2018-11-19 DIAGNOSIS — H53.2 DIPLOPIA: ICD-10-CM

## 2018-11-19 DIAGNOSIS — M81.8 OSTEOPOROSIS, IDIOPATHIC: ICD-10-CM

## 2018-11-19 DIAGNOSIS — E23.0 PANHYPOPITUITARISM: ICD-10-CM

## 2018-11-19 DIAGNOSIS — H52.4 PRESBYOPIA: ICD-10-CM

## 2018-11-19 DIAGNOSIS — H52.7 REFRACTIVE ERRORS: ICD-10-CM

## 2018-11-19 DIAGNOSIS — E03.8 OTHER SPECIFIED HYPOTHYROIDISM: ICD-10-CM

## 2018-11-19 DIAGNOSIS — E78.5 HYPERLIPIDEMIA, UNSPECIFIED HYPERLIPIDEMIA TYPE: ICD-10-CM

## 2018-11-19 DIAGNOSIS — E89.0 HYPOTHYROIDISM, POSTSURGICAL: ICD-10-CM

## 2018-11-19 DIAGNOSIS — Z00.00 ANNUAL PHYSICAL EXAM: ICD-10-CM

## 2018-11-19 LAB
ALBUMIN SERPL BCP-MCNC: 3.6 G/DL
ALBUMIN SERPL BCP-MCNC: 3.6 G/DL
ALP SERPL-CCNC: 58 U/L
ALP SERPL-CCNC: 58 U/L
ALT SERPL W/O P-5'-P-CCNC: 14 U/L
ALT SERPL W/O P-5'-P-CCNC: 14 U/L
ANION GAP SERPL CALC-SCNC: 7 MMOL/L
AST SERPL-CCNC: 16 U/L
AST SERPL-CCNC: 16 U/L
BILIRUB DIRECT SERPL-MCNC: 0.2 MG/DL
BILIRUB SERPL-MCNC: 0.4 MG/DL
BILIRUB SERPL-MCNC: 0.4 MG/DL
BUN SERPL-MCNC: 15 MG/DL
CALCIUM SERPL-MCNC: 9.3 MG/DL
CHLORIDE SERPL-SCNC: 109 MMOL/L
CHOLEST SERPL-MCNC: 227 MG/DL
CHOLEST/HDLC SERPL: 3.9 {RATIO}
CO2 SERPL-SCNC: 27 MMOL/L
CREAT SERPL-MCNC: 0.8 MG/DL
EST. GFR  (AFRICAN AMERICAN): >60 ML/MIN/1.73 M^2
EST. GFR  (NON AFRICAN AMERICAN): >60 ML/MIN/1.73 M^2
GLUCOSE SERPL-MCNC: 73 MG/DL
HDLC SERPL-MCNC: 58 MG/DL
HDLC SERPL: 25.6 %
IRON SERPL-MCNC: 119 UG/DL
IRON SERPL-MCNC: 119 UG/DL
LDLC SERPL CALC-MCNC: 139.4 MG/DL
NONHDLC SERPL-MCNC: 169 MG/DL
POTASSIUM SERPL-SCNC: 5 MMOL/L
PROT SERPL-MCNC: 6.3 G/DL
PROT SERPL-MCNC: 6.3 G/DL
SATURATED IRON: 41 %
SODIUM SERPL-SCNC: 143 MMOL/L
T4 FREE SERPL-MCNC: 1.24 NG/DL
TOTAL IRON BINDING CAPACITY: 287 UG/DL
TRANSFERRIN SERPL-MCNC: 194 MG/DL
TRIGL SERPL-MCNC: 148 MG/DL
TSH SERPL DL<=0.005 MIU/L-ACNC: 2.25 UIU/ML

## 2018-11-19 PROCEDURE — 92014 COMPRE OPH EXAM EST PT 1/>: CPT | Mod: S$GLB,,, | Performed by: OPTOMETRIST

## 2018-11-19 PROCEDURE — 36415 COLL VENOUS BLD VENIPUNCTURE: CPT

## 2018-11-19 PROCEDURE — 84443 ASSAY THYROID STIM HORMONE: CPT

## 2018-11-19 PROCEDURE — 80053 COMPREHEN METABOLIC PANEL: CPT

## 2018-11-19 PROCEDURE — 99999 PR PBB SHADOW E&M-EST. PATIENT-LVL III: CPT | Mod: PBBFAC,,, | Performed by: OPTOMETRIST

## 2018-11-19 PROCEDURE — 80076 HEPATIC FUNCTION PANEL: CPT

## 2018-11-19 PROCEDURE — 80061 LIPID PANEL: CPT

## 2018-11-19 PROCEDURE — 83540 ASSAY OF IRON: CPT

## 2018-11-19 PROCEDURE — 84439 ASSAY OF FREE THYROXINE: CPT

## 2018-11-19 PROCEDURE — 92015 DETERMINE REFRACTIVE STATE: CPT | Mod: S$GLB,,, | Performed by: OPTOMETRIST

## 2018-11-19 NOTE — PATIENT INSTRUCTIONS
S/P cataract surgery in the right eye, with posterior chamber intraocular lens.  Nuclear sclerosis of lens of the eye, consistent with age.  Residual myopic refractive error in the right eye, with good best-corrected VA.  Myopia with astigmatism in the left eye, with satisfactory best-corrected VA in that eye.  No compelling need for cataract surgery in the left eye at this time.     Otherwise, ocular health appears good in each eye.     Ms. Duncan notes intermittent diplopia on far right gaze and on far left gaze, likely secondary to decompensated esophoria.  No muscle imbalance noted on cover test in primary position of gaze.  Discussed with Ms. Duncan.  No need for prismatic correction in spectacle lenses, but advised to avoid extreme gaze to the right and to the left, and to turn head in desired direction of gaze to minimize diplopic symptoms.     Recheck in one year.

## 2018-11-19 NOTE — PROGRESS NOTES
"HPI     Intermittent double vision.  Last saw Dr. Carlson approximately four months ago.  At first VA with glasses seemed "fine", but recently notes intermittent   double vision - not diagonal, but not sure whether its horizontal or   vertical.   Seems to notice only on far right gaze and far left gaze.    Also noted recent blurry VA when looking at blue light (on a sign), but   red light/letters appeared okay.     Patient's age: 63 y.o.  WF  Occupation: [employer]  Approximate date of last eye examination:  Four montns  Name of last eye doctor seen: Dr. Carlson  Wears glasses? yes     If yes, wears  Full-time or part-time?  Full-time  Present glasses are: Bifocal, SV Distance, SV Reading?  Progressive add   lenses  Approximate age of present glasses:  Four months   Got new glasses following last exam, or subsequently?:  yes   Any problem with VA with glasses?  See notes above  Wears CLs?:  no  Headaches?  no  Eye pain/discomfort?  Dry eye symptoms - normally does not use lubricating   drops                                                                                     Flashes?  no  Floaters?  no  Diplopia/Double vision?   Yes - see notes above  Patient's Ocular History:         Any eye surgeries? S/P cataract surgery in [place] only several years   ago         Any eye injury?  Scratch to cornea to right eye.  Recurring   symptoms of discomfort in OD since then         Any treatment for eye disease?  no  Family history of eye disease?  Sister: detached retina - also uncle(s) on   mother's side  Significant patient medical history:         1. Diabetes?  no       If yes, IDDM or NIDDM? n/a   2. HBP?  no              3. Other (describe):  History of Cushing's Syndrome -  S/P   removal of adrenal glands.    S/P irradiation of pituitary gland.   S/P thyroid excision (nodules) -   father had thyroid cancer.  Bladder cancer.  Currently elevated TSH levels.  Followed in   endocrinology.    ! OTC " "eyedrops currently using:  Artificial tears OU, but does not feel   need very often   ! Prescription eye meds currently using:  no   ! Any history of allergy/adverse reaction to any eye meds used   previously?  no    ! Any history of allergy/adverse reaction to eyedrops used during prior   eye exam(s)? no    ! Any history of allergy/adverse reaction to Novacaine or similar meds?   no   ! Any history of allergy/adverse reaction to Epinephrine or similar meds?   "Gives the me shakes - bad"    ! Patient okay with use of anesthetic eyedrops to check eye pressure?    yes        ! Patient okay with use of eyedrops to dilate pupils today?  yes   !  Allergies/Medications/Medical History/Family History reviewed today?    Yes       PD =     Desired reading distance =    Approx computer distance =                                                           \    Last edited by Declan Arredondo, OD on 11/19/2018  8:29 AM. (History)            Assessment /Plan     For exam results, see Encounter Report.    1. Nuclear sclerotic cataract of left eye     2. Diplopia     3. Status post cataract surgery, right     4. Pseudophakia of right eye     5. Imbalance of muscle of eye     6. Refractive errors     7. Presbyopia                    S/P cataract surgery in the right eye, with posterior chamber intraocular lens.  Nuclear sclerosis of lens of the eye, consistent with age.  Residual myopic refractive error in the right eye, with good best-corrected VA.  Myopia with astigmatism in the left eye, with satisfactory best-corrected VA in that eye.  No compelling need for cataract surgery in the left eye at this time.     Otherwise, ocular health appears good in each eye.     Ms. Duncan notes intermittent diplopia on far right gaze and on far left gaze, likely secondary to decompensated esophoria.  No muscle imbalance noted on cover test in primary position of gaze.  Discussed with MsTika Dakota.  No need for prismatic correction in spectacle " lenses, but advised to avoid extreme gaze to the right and to the left, and to turn head in desired direction of gaze to minimize diplopic  symptoms.     Recheck in one year.

## 2018-12-07 ENCOUNTER — OFFICE VISIT (OUTPATIENT)
Dept: INTERNAL MEDICINE | Facility: CLINIC | Age: 63
End: 2018-12-07
Attending: INTERNAL MEDICINE
Payer: COMMERCIAL

## 2018-12-07 ENCOUNTER — HOSPITAL ENCOUNTER (OUTPATIENT)
Dept: RADIOLOGY | Facility: OTHER | Age: 63
Discharge: HOME | End: 2018-12-07
Attending: INTERNAL MEDICINE
Payer: COMMERCIAL

## 2018-12-07 VITALS
HEART RATE: 77 BPM | BODY MASS INDEX: 28.65 KG/M2 | SYSTOLIC BLOOD PRESSURE: 100 MMHG | HEIGHT: 60 IN | DIASTOLIC BLOOD PRESSURE: 68 MMHG | OXYGEN SATURATION: 95 % | WEIGHT: 145.94 LBS

## 2018-12-07 DIAGNOSIS — E89.0 HYPOTHYROIDISM, POSTSURGICAL: Primary | ICD-10-CM

## 2018-12-07 DIAGNOSIS — R07.89 CHEST WALL PAIN: ICD-10-CM

## 2018-12-07 DIAGNOSIS — D18.03 HEMANGIOMA OF LIVER: ICD-10-CM

## 2018-12-07 DIAGNOSIS — M81.0 OSTEOPOROSIS WITHOUT CURRENT PATHOLOGICAL FRACTURE, UNSPECIFIED OSTEOPOROSIS TYPE: ICD-10-CM

## 2018-12-07 DIAGNOSIS — Z12.11 SCREENING FOR COLON CANCER: ICD-10-CM

## 2018-12-07 DIAGNOSIS — M81.8 OSTEOPOROSIS, IDIOPATHIC: ICD-10-CM

## 2018-12-07 DIAGNOSIS — E78.5 HYPERLIPIDEMIA, UNSPECIFIED HYPERLIPIDEMIA TYPE: ICD-10-CM

## 2018-12-07 DIAGNOSIS — Z12.12 SCREENING FOR COLORECTAL CANCER: ICD-10-CM

## 2018-12-07 DIAGNOSIS — C67.2 MALIGNANT NEOPLASM OF LATERAL WALL OF URINARY BLADDER: ICD-10-CM

## 2018-12-07 DIAGNOSIS — M41.9 SCOLIOSIS, UNSPECIFIED SCOLIOSIS TYPE, UNSPECIFIED SPINAL REGION: ICD-10-CM

## 2018-12-07 DIAGNOSIS — Z12.39 SCREENING FOR BREAST CANCER: ICD-10-CM

## 2018-12-07 DIAGNOSIS — E23.0 PANHYPOPITUITARISM: ICD-10-CM

## 2018-12-07 DIAGNOSIS — D18.00 HEMANGIOMA: ICD-10-CM

## 2018-12-07 DIAGNOSIS — E27.1 ADRENAL INSUFFICIENCY (ADDISON'S DISEASE): ICD-10-CM

## 2018-12-07 DIAGNOSIS — Z12.11 SCREENING FOR COLORECTAL CANCER: ICD-10-CM

## 2018-12-07 PROCEDURE — 71100 X-RAY EXAM RIBS UNI 2 VIEWS: CPT | Mod: 26,,, | Performed by: RADIOLOGY

## 2018-12-07 PROCEDURE — 99396 PREV VISIT EST AGE 40-64: CPT | Mod: S$GLB,,, | Performed by: INTERNAL MEDICINE

## 2018-12-07 PROCEDURE — 71100 X-RAY EXAM RIBS UNI 2 VIEWS: CPT | Mod: TC,FY

## 2018-12-07 PROCEDURE — 99999 PR PBB SHADOW E&M-EST. PATIENT-LVL V: CPT | Mod: PBBFAC,,, | Performed by: INTERNAL MEDICINE

## 2018-12-07 NOTE — PROGRESS NOTES
Subjective:   Patient ID: Tess Duncan is a 63 y.o. female  Chief complaint:   Chief Complaint   Patient presents with    Annual Exam       HPI  Pt is here for annual exam     Hypothyroidism : TFTs wnl - damari current dose of levothyroxine    Fatigue   No snoring   Will awaken by 5 or 4 am no matter when goes to sleep   Going to sleep on average around 11am - sleeping on average 5-6 h per night    Will fall asleep in bed watching TV and awaken 1-2 hours later and turn off lights and go back to sleep    Bladder CA: Followed by urology and undergoing BCG therapy x 3 and scope arranged for this month     Followed by endocrine for:   Status post Cushing's disease with bilateral adrenalectomy -Radiation to the pituitary to avoid Dakota syndrome.   -Growth hormone deficiency. Last IGF1 56  -Osteoporosis and scoliosis: taking alendronate - skipping dose but working to correct this   -Status post thyroidectomy for multinodular goiter and hypothyroidism and exogenous hyperthyroidism.   - TFTs wnl on levothyroxine    - has appt with endocrine Dr. Schilling 1/24/19    Large hemangioma in the liver. Checked 2016 - no change     Restless legs: stable    Due for:   mmg   cscope - difficulty with obtaining ride     Was walking this weekend and tripped on uneven sidewalk   Fell on left knee and right elbow pushed into chest wall and to brace fall but cheek scraped ground - no loc or dizziness or weakness, vision changes   Having chest wall pain at right chest wall   No sob or cp     Review of Systems    Objective:  Vitals:    12/07/18 1205   BP: 100/68   BP Location: Left arm   Patient Position: Sitting   BP Method: Medium (Manual)   Pulse: 77   SpO2: 95%   Weight: 66.2 kg (145 lb 15.1 oz)   Height: 5' (1.524 m)     Body mass index is 28.5 kg/m².    Physical Exam   Constitutional: She is oriented to person, place, and time. She appears well-developed and well-nourished.   HENT:   Head: Normocephalic and atraumatic.   Right Ear:  External ear normal.   Left Ear: External ear normal.   Nose: Nose normal.   Mouth/Throat: Oropharynx is clear and moist. No oropharyngeal exudate.   No carotid bruits   Eyes: Conjunctivae and EOM are normal.   Neck: Neck supple. No thyromegaly present.   Cardiovascular: Normal rate, regular rhythm, normal heart sounds and intact distal pulses.   Pulmonary/Chest: Effort normal and breath sounds normal. She exhibits tenderness.       Abdominal: Soft. Bowel sounds are normal.   Musculoskeletal: She exhibits no edema or tenderness.   Lymphadenopathy:     She has no cervical adenopathy.   Neurological: She is alert and oriented to person, place, and time.   Skin: Skin is warm and dry.   Psychiatric: Her behavior is normal. Thought content normal.   Vitals reviewed.      Assessment:  1. Hypothyroidism, postsurgical    2. Screening for colorectal cancer    3. Adrenal insufficiency ([place]'s disease)    4. Osteoporosis without current pathological fracture, unspecified osteoporosis type    5. Screening for breast cancer    6. Screening for colon cancer    7. Scoliosis, unspecified scoliosis type, unspecified spinal region    8. Hemangioma    9. Chest wall pain    10. Panhypopituitarism    11. Osteoporosis, idiopathic    12. Malignant neoplasm of lateral wall of urinary bladder    13. Hemangioma of liver    14. Hyperlipidemia, unspecified hyperlipidemia type        Plan:  Tess was seen today for annual exam.    Diagnoses and all orders for this visit:    Hypothyroidism, postsurgical    Screening for colorectal cancer    Adrenal insufficiency ([place]'s disease)    Osteoporosis without current pathological fracture, unspecified osteoporosis type  -     DXA Bone Density Spine And Hip; Future    Screening for breast cancer  -     Mammo Digital Screening Bilat w/ Sean; Future    Screening for colon cancer  -     Case request GI: COLONOSCOPY    Scoliosis, unspecified scoliosis type, unspecified spinal region  -      Ambulatory Referral to [facility]    Hemangioma  -     US Abdomen Limited; Future    Chest wall pain  -     X-Ray Ribs 2 View Right; Future    Panhypopituitarism    Osteoporosis, idiopathic    Malignant neoplasm of lateral wall of urinary bladder    Hemangioma of liver    Hyperlipidemia, unspecified hyperlipidemia type    Cont meds   F/u with specialists   Reviewed rec vaccines   Order for cscope     Health Maintenance   Topic Date Due    Pneumococcal Vaccine (Highest Risk) (1 of 3 - PCV13) 01/14/1976    Colonoscopy  07/30/2014    Zoster Vaccine  01/14/2017    Mammogram  12/01/2019    Lipid Panel  11/19/2023    TETANUS VACCINE  03/05/2025    Hepatitis C Screening  Completed    Influenza Vaccine  Completed

## 2018-12-10 ENCOUNTER — TELEPHONE (OUTPATIENT)
Dept: INTERNAL MEDICINE | Facility: CLINIC | Age: 63
End: 2018-12-10

## 2018-12-10 ENCOUNTER — PATIENT MESSAGE (OUTPATIENT)
Dept: INFECTIOUS DISEASES | Facility: CLINIC | Age: 63
End: 2018-12-10

## 2018-12-10 NOTE — TELEPHONE ENCOUNTER
Left voice message for patient to schedule appointment from referral to [facility].  Joel GRAFF  [phone]

## 2018-12-12 ENCOUNTER — HOSPITAL ENCOUNTER (OUTPATIENT)
Dept: RADIOLOGY | Facility: OTHER | Age: 63
Discharge: HOME | End: 2018-12-12
Attending: INTERNAL MEDICINE
Payer: COMMERCIAL

## 2018-12-12 DIAGNOSIS — Z12.39 SCREENING FOR BREAST CANCER: ICD-10-CM

## 2018-12-12 DIAGNOSIS — M81.0 OSTEOPOROSIS WITHOUT CURRENT PATHOLOGICAL FRACTURE, UNSPECIFIED OSTEOPOROSIS TYPE: ICD-10-CM

## 2018-12-12 PROCEDURE — 77063 BREAST TOMOSYNTHESIS BI: CPT | Mod: TC

## 2018-12-12 PROCEDURE — 77080 DXA BONE DENSITY AXIAL: CPT | Mod: 26,,, | Performed by: RADIOLOGY

## 2018-12-12 PROCEDURE — 77067 SCR MAMMO BI INCL CAD: CPT | Mod: TC

## 2018-12-12 PROCEDURE — 77067 SCR MAMMO BI INCL CAD: CPT | Mod: 26,,, | Performed by: RADIOLOGY

## 2018-12-12 PROCEDURE — 77080 DXA BONE DENSITY AXIAL: CPT | Mod: TC

## 2018-12-12 PROCEDURE — 77063 BREAST TOMOSYNTHESIS BI: CPT | Mod: 26,,, | Performed by: RADIOLOGY

## 2018-12-13 ENCOUNTER — PATIENT MESSAGE (OUTPATIENT)
Dept: INTERNAL MEDICINE | Facility: CLINIC | Age: 63
End: 2018-12-13

## 2018-12-19 ENCOUNTER — PROCEDURE VISIT (OUTPATIENT)
Dept: UROLOGY | Facility: CLINIC | Age: 63
End: 2018-12-19
Attending: UROLOGY
Payer: COMMERCIAL

## 2018-12-19 VITALS
WEIGHT: 145 LBS | DIASTOLIC BLOOD PRESSURE: 60 MMHG | SYSTOLIC BLOOD PRESSURE: 119 MMHG | HEART RATE: 76 BPM | BODY MASS INDEX: 28.47 KG/M2 | HEIGHT: 60 IN

## 2018-12-19 DIAGNOSIS — C67.2 MALIGNANT NEOPLASM OF LATERAL WALL OF URINARY BLADDER: ICD-10-CM

## 2018-12-19 LAB
BILIRUB SERPL-MCNC: ABNORMAL MG/DL
BLOOD URINE, POC: ABNORMAL
COLOR, POC UA: ABNORMAL
GLUCOSE UR QL STRIP: ABNORMAL
KETONES UR QL STRIP: ABNORMAL
LEUKOCYTE ESTERASE URINE, POC: ABNORMAL
NITRITE, POC UA: ABNORMAL
PH, POC UA: 5
PROTEIN, POC: ABNORMAL
SPECIFIC GRAVITY, POC UA: 1.01
UROBILINOGEN, POC UA: NORMAL

## 2018-12-19 PROCEDURE — 81002 URINALYSIS NONAUTO W/O SCOPE: CPT | Mod: S$GLB,,, | Performed by: UROLOGY

## 2018-12-19 PROCEDURE — 52000 CYSTOURETHROSCOPY: CPT | Mod: S$GLB,,, | Performed by: UROLOGY

## 2018-12-19 RX ORDER — LIDOCAINE HYDROCHLORIDE 20 MG/ML
JELLY TOPICAL
Status: COMPLETED | OUTPATIENT
Start: 2018-12-19 | End: 2018-12-19

## 2018-12-19 RX ORDER — CIPROFLOXACIN 500 MG/1
500 TABLET ORAL
Status: COMPLETED | OUTPATIENT
Start: 2018-12-19 | End: 2018-12-19

## 2018-12-19 RX ADMIN — CIPROFLOXACIN 500 MG: 500 TABLET ORAL at 08:12

## 2018-12-19 RX ADMIN — LIDOCAINE HYDROCHLORIDE 5 ML: 20 JELLY TOPICAL at 08:12

## 2018-12-19 NOTE — PROCEDURES
"Cystoscopy  Date/Time: 12/19/2018 8:22 AM  Performed by: Whitney Gee MD  Authorized by: Whitney Gee MD     Consent Done?:  Yes (Written)  Time out: Immediately prior to procedure a "time out" was called to verify the correct patient, procedure, equipment, support staff and site/side marked as required.    Indications: history bladder cancer    Position:  Dorsal lithotomy  Anesthesia:  Lidocaine jelly  Patient sedated?: No    Preparation: Patient was prepped and draped in usual sterile fashion      Scope type:  Flexible cystoscope  Stent inserted: No    Stent removed: No    External exam normal: Yes    Digital exam performed: No    Urethra normal: Yes  Bladder neck normal: Bladder neck normal   Bladder normal: Yes      Patient tolerance:  Patient tolerated the procedure well with no immediate complications     Normal cystoscopy.  Repeat in 3 months, followed by BCG x 3.      "

## 2018-12-20 ENCOUNTER — HOSPITAL ENCOUNTER (OUTPATIENT)
Dept: RADIOLOGY | Facility: OTHER | Age: 63
Discharge: HOME | End: 2018-12-20
Attending: INTERNAL MEDICINE
Payer: COMMERCIAL

## 2018-12-20 DIAGNOSIS — D18.00 HEMANGIOMA: ICD-10-CM

## 2018-12-20 PROCEDURE — 76705 ECHO EXAM OF ABDOMEN: CPT | Mod: TC

## 2018-12-20 PROCEDURE — 76705 ECHO EXAM OF ABDOMEN: CPT | Mod: 26,,, | Performed by: RADIOLOGY

## 2018-12-26 ENCOUNTER — PATIENT MESSAGE (OUTPATIENT)
Dept: INTERNAL MEDICINE | Facility: CLINIC | Age: 63
End: 2018-12-26

## 2019-01-23 NOTE — PROGRESS NOTES
Subjective:      Patient ID: Gab Duncan is a 64 y.o. female.    Chief Complaint:  Osteoporosis and Hypopituitarism      History of Present Illness  Ms. Duncan presents for follow up of panhypopituitarism and osteoporosis.      New patient to me, previously seen by Beau Pompa and Constantine.    The patient has the following problems:   Cushing's disease 1972, bilateral adrenalectomy that year and pituitary radiation to prevent Dakota's later that year.     # Status post Cushing's disease with bilateral adrenalectomy.   # Radiation to the pituitary to avoid Dakota syndrome.    # Growth hormone deficiency.off of GH now  Last IGF1 normal, growth hormone was discontinued when levels were normal. Levels now fluctuate between normal and slightly low.  # Osteoporosis and scoliosis.   # Status post thyroidectomy in 1/2009 with benign goiter, was started on thyroid hormone at that time  Father with thyroid cancer   In regards to the Cushing's and bilateral adrenalectomy, she is currently   Taking prednisone 5-7.5 mg per day mg per day. Understands sick day precautions.    Now taking DHEA 25 mg daily and feels better on this.    No headaches or blurry vision.      Florinef 0.1 M/W/F/Ascencio  0.05 rest of the week   No hypotension. Normal appetite. No dizziness or lightheadedness.     Reports getting some early AM hypoglycemia before she takes prednisone or eats.     Osteoporosis    One fracture foot fracture  On fosamax since 2/2017.     Review of Systems   Constitutional: Negative for chills and fever.   Gastrointestinal: Negative for nausea.       Objective:   Physical Exam   Nursing note and vitals reviewed.  No thyroid tissue palpated  No tremor  DTR's 2 +  Lungs CTA b/l  Normal visual fields to confrontation bilaterally    Lab Review:   Results for GAB DUNCAN (MRN [id number]) as of 1/23/2019 17:48   Ref. Range 2/20/2018 07:20 3/7/2018 08:34 11/19/2018 06:59   Vit D, 25-Hydroxy Latest Ref Range: 30 - 96 ng/mL 53     ACTH  Latest Ref Range: 0 - 46 pg/mL <10     Somatomedin (IGF-I) Latest Ref Range: 35 - 201 ng/mL 56     TSH Latest Ref Range: 0.400 - 4.000 uIU/mL 1.259  2.248   Free T4 Latest Ref Range: 0.71 - 1.51 ng/dL 1.50  1.24   DHEA Latest Ref Range: 0.630 - 4.700 ng/mL 1.540     DHEA-SO4 Latest Ref Range: 29.7 - 182.2 ug/dL 188.0 (H)     FSH Latest Ref Range: See Text mIU/mL 30.30     LH Latest Ref Range: See Text mIU/mL 21.5     Prolactin Latest Ref Range: 5.2 - 26.5 ng/mL 5.9         Assessment:     1. Panhypopituitarism    2. Adrenal insufficiency ([place]'s disease)    3. Hypothyroidism, postsurgical    4. Osteoporosis, idiopathic        Plan:     # Cushing's disease s/p radiation and b/l adrenalectomy in 1972     # Adrenal insufficiency due to radiation to pituitary and b/l adrenalectomy   On prednisone 5 mg daily most days, occ takes 7.5 if having more stress on a given day  Knows sick day management   On florinef - continue current dose   Get CMP and renin level      # Hypothyroidism post thyroidectomy   - continue levothyroxine 88 mcg daily  - Will check labs today     # taking DHEA   Get DHEA and DEHAS levels today    #For osteoporosis  No hx of fragility fracture  Bone density showed stability but on different denistometer  Continue weekly alendronate  Continue calcium and Vit d supplements    RTC in one year, labs now     Jose Schilling M.D. Staff Endocrinology

## 2019-01-24 ENCOUNTER — OFFICE VISIT (OUTPATIENT)
Dept: ENDOCRINOLOGY | Facility: CLINIC | Age: 64
End: 2019-01-24
Payer: COMMERCIAL

## 2019-01-24 VITALS
WEIGHT: 144.81 LBS | DIASTOLIC BLOOD PRESSURE: 70 MMHG | HEIGHT: 61 IN | SYSTOLIC BLOOD PRESSURE: 110 MMHG | BODY MASS INDEX: 27.34 KG/M2

## 2019-01-24 DIAGNOSIS — M81.8 OSTEOPOROSIS, IDIOPATHIC: ICD-10-CM

## 2019-01-24 DIAGNOSIS — E23.0 PANHYPOPITUITARISM: Primary | ICD-10-CM

## 2019-01-24 DIAGNOSIS — E27.1 ADRENAL INSUFFICIENCY (ADDISON'S DISEASE): ICD-10-CM

## 2019-01-24 DIAGNOSIS — E89.0 HYPOTHYROIDISM, POSTSURGICAL: ICD-10-CM

## 2019-01-24 PROCEDURE — 99214 PR OFFICE/OUTPT VISIT, EST, LEVL IV, 30-39 MIN: ICD-10-PCS | Mod: S$GLB,,, | Performed by: INTERNAL MEDICINE

## 2019-01-24 PROCEDURE — 99214 OFFICE O/P EST MOD 30 MIN: CPT | Mod: S$GLB,,, | Performed by: INTERNAL MEDICINE

## 2019-01-24 PROCEDURE — 99999 PR PBB SHADOW E&M-EST. PATIENT-LVL III: ICD-10-PCS | Mod: PBBFAC,,, | Performed by: INTERNAL MEDICINE

## 2019-01-24 PROCEDURE — 99999 PR PBB SHADOW E&M-EST. PATIENT-LVL III: CPT | Mod: PBBFAC,,, | Performed by: INTERNAL MEDICINE

## 2019-01-24 RX ORDER — PREDNISONE 5 MG/1
TABLET ORAL
Qty: 45 TABLET | Refills: 11 | Status: SHIPPED | OUTPATIENT
Start: 2019-01-24 | End: 2020-02-07 | Stop reason: SDUPTHER

## 2019-01-24 RX ORDER — ALENDRONATE SODIUM 70 MG/1
TABLET ORAL
Qty: 4 TABLET | Refills: 12 | Status: SHIPPED | OUTPATIENT
Start: 2019-01-24 | End: 2020-02-07 | Stop reason: SDUPTHER

## 2019-01-24 RX ORDER — FLUDROCORTISONE ACETATE 0.1 MG/1
100 TABLET ORAL DAILY
Qty: 30 TABLET | Refills: 11 | Status: SHIPPED | OUTPATIENT
Start: 2019-01-24 | End: 2020-02-07 | Stop reason: SDUPTHER

## 2019-01-24 RX ORDER — LEVOTHYROXINE SODIUM 88 UG/1
88 TABLET ORAL DAILY
Qty: 30 TABLET | Refills: 11 | Status: SHIPPED | OUTPATIENT
Start: 2019-01-24 | End: 2020-02-07 | Stop reason: SDUPTHER

## 2019-01-28 ENCOUNTER — PATIENT MESSAGE (OUTPATIENT)
Dept: ENDOCRINOLOGY | Facility: CLINIC | Age: 64
End: 2019-01-28

## 2019-01-28 DIAGNOSIS — E23.0 PANHYPOPITUITARISM: Primary | ICD-10-CM

## 2019-01-29 ENCOUNTER — PATIENT MESSAGE (OUTPATIENT)
Dept: ENDOCRINOLOGY | Facility: CLINIC | Age: 64
End: 2019-01-29

## 2019-02-13 ENCOUNTER — PATIENT MESSAGE (OUTPATIENT)
Dept: ENDOCRINOLOGY | Facility: CLINIC | Age: 64
End: 2019-02-13

## 2019-03-06 ENCOUNTER — LAB VISIT (OUTPATIENT)
Dept: LAB | Facility: HOSPITAL | Age: 64
End: 2019-03-06
Attending: INTERNAL MEDICINE
Payer: COMMERCIAL

## 2019-03-06 DIAGNOSIS — E23.0 PANHYPOPITUITARISM: ICD-10-CM

## 2019-03-06 LAB — DHEA-S SERPL-MCNC: 146 UG/DL

## 2019-03-06 PROCEDURE — 82626 DEHYDROEPIANDROSTERONE: CPT

## 2019-03-06 PROCEDURE — 82627 DEHYDROEPIANDROSTERONE: CPT

## 2019-03-09 LAB — DHEA SERPL-MCNC: 1.89 NG/ML (ref 0.63–4.7)

## 2019-03-27 ENCOUNTER — PROCEDURE VISIT (OUTPATIENT)
Dept: UROLOGY | Facility: CLINIC | Age: 64
End: 2019-03-27
Attending: UROLOGY
Payer: COMMERCIAL

## 2019-03-27 VITALS
HEART RATE: 81 BPM | WEIGHT: 144.38 LBS | SYSTOLIC BLOOD PRESSURE: 132 MMHG | DIASTOLIC BLOOD PRESSURE: 81 MMHG | HEIGHT: 61 IN | BODY MASS INDEX: 27.26 KG/M2

## 2019-03-27 DIAGNOSIS — C67.2 MALIGNANT NEOPLASM OF LATERAL WALL OF URINARY BLADDER: Primary | ICD-10-CM

## 2019-03-27 LAB
BILIRUB SERPL-MCNC: ABNORMAL MG/DL
BLOOD URINE, POC: ABNORMAL
COLOR, POC UA: ABNORMAL
GLUCOSE UR QL STRIP: NORMAL
KETONES UR QL STRIP: ABNORMAL
LEUKOCYTE ESTERASE URINE, POC: ABNORMAL
NITRITE, POC UA: ABNORMAL
PH, POC UA: 5
PROTEIN, POC: ABNORMAL
SPECIFIC GRAVITY, POC UA: 1.01
UROBILINOGEN, POC UA: NORMAL

## 2019-03-27 PROCEDURE — 52000 CYSTOURETHROSCOPY: CPT | Mod: S$GLB,,, | Performed by: UROLOGY

## 2019-03-27 PROCEDURE — 81002 POCT URINE DIPSTICK WITHOUT MICROSCOPE: ICD-10-PCS | Mod: S$GLB,,, | Performed by: UROLOGY

## 2019-03-27 PROCEDURE — 81002 URINALYSIS NONAUTO W/O SCOPE: CPT | Mod: S$GLB,,, | Performed by: UROLOGY

## 2019-03-27 PROCEDURE — 52000 CYSTOSCOPY: ICD-10-PCS | Mod: S$GLB,,, | Performed by: UROLOGY

## 2019-03-27 RX ORDER — CIPROFLOXACIN 500 MG/1
500 TABLET ORAL
Status: COMPLETED | OUTPATIENT
Start: 2019-03-27 | End: 2019-03-27

## 2019-03-27 RX ORDER — LIDOCAINE HYDROCHLORIDE 20 MG/ML
JELLY TOPICAL
Status: COMPLETED | OUTPATIENT
Start: 2019-03-27 | End: 2019-03-27

## 2019-03-27 RX ADMIN — CIPROFLOXACIN 500 MG: 500 TABLET ORAL at 08:03

## 2019-03-27 RX ADMIN — LIDOCAINE HYDROCHLORIDE 5 ML: 20 JELLY TOPICAL at 08:03

## 2019-03-27 NOTE — PROCEDURES
"Cystoscopy  Date/Time: 3/27/2019 8:31 AM  Performed by: Whitney Gee MD  Authorized by: Ching Rico NP     Consent Done?:  Yes (Written)  Time out: Immediately prior to procedure a "time out" was called to verify the correct patient, procedure, equipment, support staff and site/side marked as required.    Indications: history bladder cancer    Position:  Dorsal lithotomy  Anesthesia:  Lidocaine jelly  Patient sedated?: No    Preparation: Patient was prepped and draped in usual sterile fashion      Scope type:  Flexible cystoscope  Stent inserted: No    Stent removed: No    External exam normal: Yes    Digital exam performed: No    Urethra normal: Yes  Bladder neck normal: Bladder neck normal   Bladder normal: Yes      Patient tolerance:  Patient tolerated the procedure well with no immediate complications     Will give BCG x 2-3, last dose of maintenance (will be full year of maintenance). Okay to do 1/3 dose.   Cysto 3 months.       "

## 2019-04-03 ENCOUNTER — PATIENT MESSAGE (OUTPATIENT)
Dept: UROLOGY | Facility: CLINIC | Age: 64
End: 2019-04-03

## 2019-04-10 ENCOUNTER — PATIENT MESSAGE (OUTPATIENT)
Dept: ADMINISTRATIVE | Facility: HOSPITAL | Age: 64
End: 2019-04-10

## 2019-04-10 ENCOUNTER — PATIENT OUTREACH (OUTPATIENT)
Dept: ADMINISTRATIVE | Facility: HOSPITAL | Age: 64
End: 2019-04-10

## 2019-04-10 DIAGNOSIS — Z12.11 SCREENING FOR MALIGNANT NEOPLASM OF COLON: Primary | ICD-10-CM

## 2019-04-10 NOTE — PROGRESS NOTES
Subjective:       Patient ID: Tess Duncan is a 64 y.o. female.    Chief Complaint: No chief complaint on file.    HPI: Tess Duncan is a 64 y.o. White female who presents today for intravesical BCG therapy for treatment of her non muscle-invasive bladder cancer.    The patient has a negative history of smoking.    The patient's bladder cancer history/chronology is: Small papillary tumor removed in OR (Path: [facility]), ReTURBT: negative (January 2018)     This is the patient's 1/2 treatments at 12 months maintenance. Has tolerated previous treatments well. Denies urinary symptoms today.      Current Outpatient Medications   Medication Sig Dispense Refill    alendronate (FOSAMAX) 70 MG tablet 1 tablet on Sunday with a full glass of water and wait 30 minutes for breakfast and pills. Take sitting or standing 4 tablet 12    calcium carbonate (CORAL CALCIUM) 390 mg  (1,000 mg) Tab Take by mouth.      cyanocobalamin (VITAMIN B-12) 500 MCG tablet Take 500 mcg by mouth once daily.      fludrocortisone (FLORINEF) 0.1 mg Tab Take 1 tablet (100 mcg total) by mouth once daily. 30 tablet 11    levothyroxine (SYNTHROID) 88 MCG tablet Take 1 tablet (88 mcg total) by mouth once daily. 30 tablet 11    phenazopyridine (PYRIDIUM) 100 MG tablet Take 2 tablets (200 mg total) by mouth 3 (three) times daily with meals. 18 tablet 0    prasterone, dhea, (DHEA) 25 mg Cap Take 20 mg by mouth.      predniSONE (DELTASONE) 5 MG tablet TAKE 1 TABLET BY MOUTH EVERY MORNING AND 1/2 TABLET EVERY EVENING 45 tablet 11    UNABLE TO FIND Take by mouth once daily.  MG       UNABLE TO FIND Take by mouth once daily. TUMERIC       vitamin D 1000 units Tab Take 185 mg by mouth once daily.       Current Facility-Administered Medications   Medication Dose Route Frequency Provider Last Rate Last Dose    [START ON 4/11/2019] BCG live (MOY) 50 mg in sodium chloride 0.9% 50 mL bladder instillation  50 mg Bladder Instillation Weekly  Whitney Gee MD           Past Medical History:   Diagnosis Date    Adrenal insufficiency ([place]'s disease)     Arthritis     Cancer     Cataract     Hemangioma of liver     Hyperlipidemia     Osteopenia     Panhypopituitarism     Recurrent UTI     Renal cyst     Scoliosis        Past Surgical History:   Procedure Laterality Date    ADRENAL GLAND SURGERY      APPENDECTOMY      BIOPSY-BLADDER, cystoscopy, retrograde pyelogram Bilateral 11/29/2017    [facility] at [facility]    BLADDER SURGERY      CATARACT EXTRACTION      CYSTOSCOPY W/BLADDER BIOPSY FULGURATION-BLADDER N/A 1/10/2018    [facility] at [facility] OR    EYE SURGERY      cataract    LASER LAPAROSCOPY      THYROID SURGERY       Review of Systems    Review of Systems   Constitutional: Negative for fever, chills, activity change, appetite change and unexpected weight change.   Respiratory: Negative for cough, choking, chest tightness and shortness of breath.    Cardiovascular: Negative for chest pain, palpitations and leg swelling.   Gastrointestinal: Negative for nausea, vomiting, abdominal pain, diarrhea, blood in stool, abdominal distention and anal bleeding.  Genitourinary: As documented per HPI   Musculoskeletal: Negative for myalgias, gait problem, neck pain and neck stiffness.   Skin: Negative for color change, pallor, rash and wound.   Psychiatric/Behavioral: Negative for hallucinations, behavioral problems, self-injury and agitation. The patient is not hyperactive.      All other systems were reviewed and were negative.    Objective:     Vitals:    04/11/19 1406   BP: 118/76   Pulse: 81     Physical Exam   Vitals reviewed.  Constitutional: She is oriented to person, place, and time. She appears well-developed and well-nourished. No distress.   Cardiovascular: Normal heart sounds and intact distal pulses.   Pulmonary/Chest: Effort normal and breath sounds normal.   Abdominal:  Soft. Bowel sounds are normal. She exhibits no distension. There is no tenderness.  Musculoskeletal: Normal range of motion. She exhibits no edema.   Neurological: She is alert and oriented to person, place, and time. Coordination normal.   Skin: Skin is warm and dry. She is not diaphoretic.     Lab Results   Component Value Date    CREATININE 0.8 01/24/2019     Lab Results   Component Value Date    EGFRNONAA >60.0 01/24/2019     Lab Results   Component Value Date    ESTGFRAFRICA >60.0 01/24/2019     UA: positive for leukocytes (trace)     BCG mixed per protocol, pt catheterized per sterile technique.  BCG instilled into bladder by COLLIN Ellis     Assessment:    Malignant neoplasm of lateral wall of urinary bladder     Plan:   Diagnoses and all orders for this visit:    Malignant neoplasm of lateral wall of urinary bladder    Plan:   --BCG today (1/3 dose)  --Follow up in 1 week for BCG

## 2019-04-11 ENCOUNTER — OFFICE VISIT (OUTPATIENT)
Dept: UROLOGY | Facility: CLINIC | Age: 64
End: 2019-04-11
Payer: COMMERCIAL

## 2019-04-11 VITALS
HEIGHT: 61 IN | HEART RATE: 81 BPM | SYSTOLIC BLOOD PRESSURE: 118 MMHG | DIASTOLIC BLOOD PRESSURE: 76 MMHG | BODY MASS INDEX: 27.19 KG/M2 | WEIGHT: 144 LBS

## 2019-04-11 DIAGNOSIS — C67.2 MALIGNANT NEOPLASM OF LATERAL WALL OF URINARY BLADDER: Primary | ICD-10-CM

## 2019-04-11 PROCEDURE — 99213 OFFICE O/P EST LOW 20 MIN: CPT | Mod: 25,S$GLB,, | Performed by: NURSE PRACTITIONER

## 2019-04-11 PROCEDURE — 99213 PR OFFICE/OUTPT VISIT, EST, LEVL III, 20-29 MIN: ICD-10-PCS | Mod: 25,S$GLB,, | Performed by: NURSE PRACTITIONER

## 2019-04-11 PROCEDURE — 51720 TREATMENT OF BLADDER LESION: CPT | Mod: S$GLB,,, | Performed by: NURSE PRACTITIONER

## 2019-04-11 PROCEDURE — 51720 PR INSTILL, ANTICANCER AGENT, BLADDER: ICD-10-PCS | Mod: S$GLB,,, | Performed by: NURSE PRACTITIONER

## 2019-04-11 NOTE — LETTER
April 11, 2019      Whitney Gee MD  [address] [facility] [address]  [address]  [facility] [address]           [facility] [address]  [facility] [address]  Phone: [phone]  Fax: [phone]          Patient: Tess Ducnan   MR Number: [id number]   YOB: 1955   Date of Visit: 4/11/2019       Dear Dr. Whitney Gee:    Thank you for referring Tess Duncan to me for evaluation. Attached you will find relevant portions of my assessment and plan of care.    If you have questions, please do not hesitate to call me. I look forward to following Tess Duncan along with you.    Sincerely,    Ching Rico, NP    Enclosure  CC:  No Recipients    If you would like to receive this communication electronically, please contact [email or web address] or [phone] to request more information on [employer] Link access.    For providers and/or their staff who would like to refer a patient to [facility], please contact us through our one-stop-shop provider referral line, [facility], at [phone].    If you feel you have received this communication in error or would no longer like to receive these types of communications, please e-mail [email or web address]

## 2019-04-22 ENCOUNTER — OFFICE VISIT (OUTPATIENT)
Dept: UROLOGY | Facility: CLINIC | Age: 64
End: 2019-04-22
Payer: COMMERCIAL

## 2019-04-22 VITALS
WEIGHT: 144 LBS | HEART RATE: 93 BPM | BODY MASS INDEX: 27.19 KG/M2 | SYSTOLIC BLOOD PRESSURE: 113 MMHG | DIASTOLIC BLOOD PRESSURE: 70 MMHG | HEIGHT: 61 IN

## 2019-04-22 DIAGNOSIS — C67.2 MALIGNANT NEOPLASM OF LATERAL WALL OF URINARY BLADDER: Primary | ICD-10-CM

## 2019-04-22 PROCEDURE — 51720 TREATMENT OF BLADDER LESION: CPT | Mod: S$GLB,,, | Performed by: NURSE PRACTITIONER

## 2019-04-22 PROCEDURE — 99213 OFFICE O/P EST LOW 20 MIN: CPT | Mod: 25,S$GLB,, | Performed by: NURSE PRACTITIONER

## 2019-04-22 PROCEDURE — 51720 PR INSTILL, ANTICANCER AGENT, BLADDER: ICD-10-PCS | Mod: S$GLB,,, | Performed by: NURSE PRACTITIONER

## 2019-04-22 PROCEDURE — 99213 PR OFFICE/OUTPT VISIT, EST, LEVL III, 20-29 MIN: ICD-10-PCS | Mod: 25,S$GLB,, | Performed by: NURSE PRACTITIONER

## 2019-04-22 NOTE — LETTER
April 22, 2019      Whitney Gee MD  [address] [facility] [address]  [address]  [facility] [address]           [facility] [address]  [facility] [address]  Phone: [phone]  Fax: [phone]          Patient: Tess Duncan   MR Number: [id number]   YOB: 1955   Date of Visit: 4/22/2019       Dear Dr. Whitney Gee:    Thank you for referring Tess Duncan to me for evaluation. Attached you will find relevant portions of my assessment and plan of care.    If you have questions, please do not hesitate to call me. I look forward to following Tess Duncan along with you.    Sincerely,    Ching Rico, NP    Enclosure  CC:  No Recipients    If you would like to receive this communication electronically, please contact [email or web address] or [phone] to request more information on [employer] Link access.    For providers and/or their staff who would like to refer a patient to [facility], please contact us through our one-stop-shop provider referral line, [facility], at [phone].    If you feel you have received this communication in error or would no longer like to receive these types of communications, please e-mail [email or web address]

## 2019-04-22 NOTE — PROGRESS NOTES
Subjective:       Patient ID: Tess Duncan is a 64 y.o. female.    Chief Complaint: No chief complaint on file.    HPI: Tess Duncan is a 64 y.o. White female who presents today for intravesical BCG therapy for treatment of her non muscle-invasive bladder cancer.    The patient has a negative history of smoking.    The patient's bladder cancer history/chronology is: Small papillary tumor removed in OR (Path: [facility]), ReTURBT: negative (January 2018)     This is the patient's 2/2 treatments at 12 months maintenance. Has tolerated previous treatments well. Denies urinary symptoms today.      Current Outpatient Medications   Medication Sig Dispense Refill    alendronate (FOSAMAX) 70 MG tablet 1 tablet on Sunday with a full glass of water and wait 30 minutes for breakfast and pills. Take sitting or standing 4 tablet 12    calcium carbonate (CORAL CALCIUM) 390 mg  (1,000 mg) Tab Take by mouth.      cyanocobalamin (VITAMIN B-12) 500 MCG tablet Take 500 mcg by mouth once daily.      fludrocortisone (FLORINEF) 0.1 mg Tab Take 1 tablet (100 mcg total) by mouth once daily. 30 tablet 11    levothyroxine (SYNTHROID) 88 MCG tablet Take 1 tablet (88 mcg total) by mouth once daily. 30 tablet 11    phenazopyridine (PYRIDIUM) 100 MG tablet Take 2 tablets (200 mg total) by mouth 3 (three) times daily with meals. 18 tablet 0    prasterone, dhea, (DHEA) 25 mg Cap Take 20 mg by mouth.      predniSONE (DELTASONE) 5 MG tablet TAKE 1 TABLET BY MOUTH EVERY MORNING AND 1/2 TABLET EVERY EVENING 45 tablet 11    UNABLE TO FIND Take by mouth once daily.  MG       UNABLE TO FIND Take by mouth once daily. TUMERIC       vitamin D 1000 units Tab Take 185 mg by mouth once daily.       Current Facility-Administered Medications   Medication Dose Route Frequency Provider Last Rate Last Dose    BCG live (MOY) 50 mg in sodium chloride 0.9% 50 mL bladder instillation  50 mg Bladder Instillation Weekly Whitney Gee MD    17 mg at 04/11/19 1512       Past Medical History:   Diagnosis Date    Adrenal insufficiency ([place]'s disease)     Arthritis     Cancer     Cataract     Hemangioma of liver     Hyperlipidemia     Osteopenia     Panhypopituitarism     Recurrent UTI     Renal cyst     Scoliosis        Past Surgical History:   Procedure Laterality Date    ADRENAL GLAND SURGERY      APPENDECTOMY      BIOPSY-BLADDER, cystoscopy, retrograde pyelogram Bilateral 11/29/2017    Performed by [facility] at [facility]    BLADDER SURGERY      CATARACT EXTRACTION      CYSTOSCOPY W/BLADDER BIOPSY FULGURATION-BLADDER N/A 1/10/2018    Performed by Whitney [facility]ee [facility] at [facility] OR    EYE SURGERY      cataract    LASER LAPAROSCOPY      THYROID SURGERY       Review of Systems    Review of Systems   Constitutional: Negative for fever, chills, activity change, appetite change and unexpected weight change.   Respiratory: Negative for cough, choking, chest tightness and shortness of breath.    Cardiovascular: Negative for chest pain, palpitations and leg swelling.   Gastrointestinal: Negative for nausea, vomiting, abdominal pain, diarrhea, blood in stool, abdominal distention and anal bleeding.  Genitourinary: As documented per HPI   Musculoskeletal: Negative for myalgias, gait problem, neck pain and neck stiffness.   Skin: Negative for color change, pallor, rash and wound.   Psychiatric/Behavioral: Negative for hallucinations, behavioral problems, self-injury and agitation. The patient is not hyperactive.      All other systems were reviewed and were negative.    Objective:     There were no vitals filed for this visit.  Physical Exam   Vitals reviewed.  Constitutional: She is oriented to person, place, and time. She appears well-developed and well-nourished. No distress.   Cardiovascular: Normal heart sounds and intact distal pulses.   Pulmonary/Chest: Effort normal and breath sounds normal.   Abdominal: Soft. Bowel  sounds are normal. She exhibits no distension. There is no tenderness.  Musculoskeletal: Normal range of motion. She exhibits no edema.   Neurological: She is alert and oriented to person, place, and time. Coordination normal.   Skin: Skin is warm and dry. She is not diaphoretic.     Lab Results   Component Value Date    CREATININE 0.8 01/24/2019     Lab Results   Component Value Date    EGFRNONAA >60.0 01/24/2019     Lab Results   Component Value Date    ESTGFRAFRICA >60.0 01/24/2019     UA: positive for leukocytes (trace)     BCG mixed per protocol, pt catheterized per sterile technique.  BCG instilled into bladder by COLLIN Ellis     Assessment:    Malignant neoplasm of lateral wall of urinary bladder     Plan:   Diagnoses and all orders for this visit:    Malignant neoplasm of lateral wall of urinary bladder    Plan:   --BCG today (1/3 dose)  --Follow up in 3 months for cysto with Dr. Gee

## 2019-06-19 ENCOUNTER — PROCEDURE VISIT (OUTPATIENT)
Dept: UROLOGY | Facility: CLINIC | Age: 64
End: 2019-06-19
Attending: UROLOGY
Payer: COMMERCIAL

## 2019-06-19 VITALS
DIASTOLIC BLOOD PRESSURE: 89 MMHG | BODY MASS INDEX: 26.22 KG/M2 | SYSTOLIC BLOOD PRESSURE: 128 MMHG | HEART RATE: 78 BPM | HEIGHT: 61 IN | WEIGHT: 138.88 LBS

## 2019-06-19 DIAGNOSIS — C67.2 MALIGNANT NEOPLASM OF LATERAL WALL OF URINARY BLADDER: ICD-10-CM

## 2019-06-19 PROCEDURE — 81002 URINALYSIS NONAUTO W/O SCOPE: CPT | Mod: S$GLB,,, | Performed by: UROLOGY

## 2019-06-19 PROCEDURE — 52000 CYSTOSCOPY: ICD-10-PCS | Mod: S$GLB,,, | Performed by: UROLOGY

## 2019-06-19 PROCEDURE — 81002 POCT URINE DIPSTICK WITHOUT MICROSCOPE: ICD-10-PCS | Mod: S$GLB,,, | Performed by: UROLOGY

## 2019-06-19 PROCEDURE — 52000 CYSTOURETHROSCOPY: CPT | Mod: S$GLB,,, | Performed by: UROLOGY

## 2019-06-19 RX ORDER — CIPROFLOXACIN 500 MG/1
500 TABLET ORAL
Status: COMPLETED | OUTPATIENT
Start: 2019-06-19 | End: 2019-06-19

## 2019-06-19 RX ORDER — LIDOCAINE HYDROCHLORIDE 20 MG/ML
JELLY TOPICAL
Status: COMPLETED | OUTPATIENT
Start: 2019-06-19 | End: 2019-06-19

## 2019-06-19 RX ORDER — MULTIVIT WITH MINERALS/HERBS
1 TABLET ORAL DAILY
COMMUNITY

## 2019-06-19 RX ADMIN — LIDOCAINE HYDROCHLORIDE 5 ML: 20 JELLY TOPICAL at 08:06

## 2019-06-19 RX ADMIN — CIPROFLOXACIN 500 MG: 500 TABLET ORAL at 08:06

## 2019-07-06 ENCOUNTER — TELEPHONE (OUTPATIENT)
Dept: ENDOSCOPY | Facility: HOSPITAL | Age: 64
End: 2019-07-06

## 2019-09-14 ENCOUNTER — OFFICE VISIT (OUTPATIENT)
Dept: URGENT CARE | Facility: CLINIC | Age: 64
End: 2019-09-14
Payer: COMMERCIAL

## 2019-09-14 VITALS
DIASTOLIC BLOOD PRESSURE: 86 MMHG | TEMPERATURE: 98 F | BODY MASS INDEX: 26.22 KG/M2 | RESPIRATION RATE: 20 BRPM | SYSTOLIC BLOOD PRESSURE: 126 MMHG | HEIGHT: 61 IN | HEART RATE: 85 BPM | OXYGEN SATURATION: 97 % | WEIGHT: 138.88 LBS

## 2019-09-14 DIAGNOSIS — S01.511A LIP LACERATION, INITIAL ENCOUNTER: Primary | ICD-10-CM

## 2019-09-14 PROCEDURE — 99214 OFFICE O/P EST MOD 30 MIN: CPT | Mod: S$GLB,,, | Performed by: NURSE PRACTITIONER

## 2019-09-14 PROCEDURE — 99214 PR OFFICE/OUTPT VISIT, EST, LEVL IV, 30-39 MIN: ICD-10-PCS | Mod: S$GLB,,, | Performed by: NURSE PRACTITIONER

## 2019-09-14 RX ORDER — SULFAMETHOXAZOLE AND TRIMETHOPRIM 800; 160 MG/1; MG/1
1 TABLET ORAL 2 TIMES DAILY
Qty: 20 TABLET | Refills: 0 | Status: SHIPPED | OUTPATIENT
Start: 2019-09-14 | End: 2019-09-24

## 2019-09-14 NOTE — PROGRESS NOTES
"Subjective:       Patient ID: Tess Duncan is a 64 y.o. female.    Vitals:  height is 5' 1" (1.549 m) and weight is 63 kg (138 lb 14.2 oz). Her temperature is 98.1 °F (36.7 °C). Her blood pressure is 126/86 and her pulse is 85. Her respiration is 20 and oxygen saturation is 97%.     Chief Complaint: Lip Laceration    Patient fell about 3 hours ago and hit her mouth and has a laceration inside her left upper lip that is still bleeding.     Laceration    The incident occurred 3 to 6 hours ago. Pain location: mouth. The laceration is 3 cm in size. The laceration mechanism was a blunt object. The pain is at a severity of 6/10. The pain is moderate. The pain has been constant since onset. She reports no foreign bodies present. Her tetanus status is UTD.       Constitution: Negative for chills, fatigue and fever.   HENT: Negative for congestion and sore throat.    Neck: Negative for painful lymph nodes.   Cardiovascular: Negative for chest pain and leg swelling.   Eyes: Negative for double vision and blurred vision.   Respiratory: Negative for cough and shortness of breath.    Gastrointestinal: Negative for nausea, vomiting and diarrhea.   Genitourinary: Negative for dysuria, frequency, urgency and history of kidney stones.   Musculoskeletal: Negative for joint pain, joint swelling, muscle cramps and muscle ache.   Skin: Positive for laceration. Negative for color change, pale, rash and bruising.   Allergic/Immunologic: Negative for seasonal allergies.   Neurological: Negative for dizziness, history of vertigo, light-headedness, passing out and headaches.   Hematologic/Lymphatic: Negative for swollen lymph nodes.   Psychiatric/Behavioral: Negative for nervous/anxious, sleep disturbance and depression. The patient is not nervous/anxious.        Objective:      Physical Exam   Constitutional: She is oriented to person, place, and time. She appears well-developed and well-nourished. She does not appear ill. No distress. "   HENT:   Mouth/Throat:       Cardiovascular: Normal rate.   Pulmonary/Chest: Effort normal.   Neurological: She is alert and oriented to person, place, and time.   Skin: Skin is warm and dry.   Psychiatric: She has a normal mood and affect. Her behavior is normal.   Nursing note and vitals reviewed.      Assessment:       1. Lip laceration, initial encounter        Plan:         Lip laceration, initial encounter  -     sulfamethoxazole-trimethoprim 800-160mg (BACTRIM DS) 800-160 mg Tab; Take 1 tablet by mouth 2 (two) times daily. for 10 days  Dispense: 20 tablet; Refill: 0    Tdap due next year.  Advised patient to keep area clean, will cover with bactrim, since she is allergic to PCN.  Follow up with primary care provider if not improved  Go to ER for new, worse or concerning symptoms

## 2019-09-23 ENCOUNTER — PATIENT OUTREACH (OUTPATIENT)
Dept: ADMINISTRATIVE | Facility: OTHER | Age: 64
End: 2019-09-23

## 2019-09-25 ENCOUNTER — PROCEDURE VISIT (OUTPATIENT)
Dept: UROLOGY | Facility: CLINIC | Age: 64
End: 2019-09-25
Attending: UROLOGY
Payer: COMMERCIAL

## 2019-09-25 VITALS
DIASTOLIC BLOOD PRESSURE: 86 MMHG | HEART RATE: 68 BPM | BODY MASS INDEX: 26.06 KG/M2 | HEIGHT: 61 IN | SYSTOLIC BLOOD PRESSURE: 110 MMHG | WEIGHT: 138 LBS

## 2019-09-25 DIAGNOSIS — C67.2 MALIGNANT NEOPLASM OF LATERAL WALL OF URINARY BLADDER: Primary | ICD-10-CM

## 2019-09-25 PROCEDURE — 52000 CYSTOURETHROSCOPY: CPT | Mod: S$GLB,,, | Performed by: UROLOGY

## 2019-09-25 PROCEDURE — 81002 URINALYSIS NONAUTO W/O SCOPE: CPT | Mod: S$GLB,,, | Performed by: UROLOGY

## 2019-09-25 PROCEDURE — 81002 POCT URINE DIPSTICK WITHOUT MICROSCOPE: ICD-10-PCS | Mod: S$GLB,,, | Performed by: UROLOGY

## 2019-09-25 PROCEDURE — 52000 CYSTOSCOPY: ICD-10-PCS | Mod: S$GLB,,, | Performed by: UROLOGY

## 2019-09-25 RX ORDER — LIDOCAINE HYDROCHLORIDE 20 MG/ML
JELLY TOPICAL
Status: COMPLETED | OUTPATIENT
Start: 2019-09-25 | End: 2019-09-25

## 2019-09-25 RX ORDER — CIPROFLOXACIN 500 MG/1
500 TABLET ORAL
Status: COMPLETED | OUTPATIENT
Start: 2019-09-25 | End: 2019-09-25

## 2019-09-25 RX ADMIN — CIPROFLOXACIN 500 MG: 500 TABLET ORAL at 09:09

## 2019-09-25 RX ADMIN — LIDOCAINE HYDROCHLORIDE 5 ML: 20 JELLY TOPICAL at 09:09

## 2019-09-25 NOTE — PROCEDURES
"Cystoscopy  Date/Time: 9/25/2019 8:00 AM  Performed by: Whitney Gee MD  Authorized by: Whitney Gee MD     Consent Done?:  Yes (Written)  Time out: Immediately prior to procedure a "time out" was called to verify the correct patient, procedure, equipment, support staff and site/side marked as required.    Indications: history bladder cancer    Position:  Dorsal lithotomy  Anesthesia:  Lidocaine jelly  Patient sedated?: No    Preparation: Patient was prepped and draped in usual sterile fashion      Scope type:  Flexible cystoscope  Stent inserted: No    Stent removed: No    External exam normal: Yes    Digital exam performed: No    Urethra normal: Yes  Bladder neck normal: Bladder neck normal   Bladder normal: Yes      Patient tolerance:  Patient tolerated the procedure well with no immediate complications     Normal cystoscopy. Cysto again in 3 months then change to q6mth.  Due for upper tract surveillance - MR urogram (contrast allergy)      "

## 2019-09-26 ENCOUNTER — TELEPHONE (OUTPATIENT)
Dept: UROLOGY | Facility: CLINIC | Age: 64
End: 2019-09-26

## 2019-09-26 DIAGNOSIS — C67.2 MALIGNANT NEOPLASM OF LATERAL WALL OF URINARY BLADDER: Primary | ICD-10-CM

## 2019-09-26 NOTE — TELEPHONE ENCOUNTER
----- Message from Mayte Looney MA sent at 9/26/2019  3:24 PM CDT -----  Contact: self       ----- Message -----  From: Pinky Baeza  Sent: 9/26/2019   3:11 PM CDT  To: Gerard Olson Staff    Type: Patient Call Back    What is the request in detail: Pt calling to speak to a nurse regarding scan not being apprved by ins.     Can the clinic reply by [facility]? No    Would the patient rather a call back or a response via [facility]? Call back     Best call back number: [phone]

## 2019-09-26 NOTE — TELEPHONE ENCOUNTER
Per the notes, the MRI has not yet been approved so needed to be rescheduled until approval was received. I have not heard any word that it was denied.

## 2019-09-30 ENCOUNTER — TELEPHONE (OUTPATIENT)
Dept: UROLOGY | Facility: CLINIC | Age: 64
End: 2019-09-30

## 2019-09-30 NOTE — TELEPHONE ENCOUNTER
I spoke to Miss Gonsalez (Pre-Services) who states she will call the insurance company and offer additional information to see if the denial will be overturned and Ms. Duncan can have her test done..She stated she will keep us posted on what the out come will be .. B.N.

## 2019-09-30 NOTE — TELEPHONE ENCOUNTER
----- Message from Genesis Bhat sent at 9/30/2019  1:42 PM CDT -----  Contact: Wallace [phone]  Type: Patient Call Back    Who called:Wallace     What is the request in detail: Wallace from [facility] called to speak to Dr. Gee, in regards to an MRI order for the patient.    Can the clinic reply by [facility]?no    Would the patient rather a call back or a response via [facility]? Call back    Best call back number:[phone]

## 2019-10-05 ENCOUNTER — PATIENT MESSAGE (OUTPATIENT)
Dept: UROLOGY | Facility: CLINIC | Age: 64
End: 2019-10-05

## 2019-10-08 ENCOUNTER — TELEPHONE (OUTPATIENT)
Dept: UROLOGY | Facility: CLINIC | Age: 64
End: 2019-10-08

## 2019-10-08 DIAGNOSIS — C67.9 MALIGNANT NEOPLASM OF URINARY BLADDER, UNSPECIFIED SITE: Primary | ICD-10-CM

## 2019-10-16 ENCOUNTER — PATIENT MESSAGE (OUTPATIENT)
Dept: UROLOGY | Facility: CLINIC | Age: 64
End: 2019-10-16

## 2019-10-18 ENCOUNTER — HOSPITAL ENCOUNTER (OUTPATIENT)
Dept: RADIOLOGY | Facility: OTHER | Age: 64
Discharge: HOME | End: 2019-10-18
Attending: UROLOGY
Payer: COMMERCIAL

## 2019-10-18 DIAGNOSIS — C67.2 MALIGNANT NEOPLASM OF LATERAL WALL OF URINARY BLADDER: ICD-10-CM

## 2019-10-18 PROCEDURE — 72197 MRI PELVIS W/O & W/DYE: CPT | Mod: 26,,, | Performed by: RADIOLOGY

## 2019-10-18 PROCEDURE — 25500020 PHARM REV CODE 255: Performed by: UROLOGY

## 2019-10-18 PROCEDURE — A9585 GADOBUTROL INJECTION: HCPCS | Performed by: UROLOGY

## 2019-10-18 PROCEDURE — 74183 MRI ABD W/O CNTR FLWD CNTR: CPT | Mod: 26,,, | Performed by: RADIOLOGY

## 2019-10-18 PROCEDURE — 72197 MRI UROGRAPHY W W/O CONTRAST: ICD-10-PCS | Mod: 26,,, | Performed by: RADIOLOGY

## 2019-10-18 PROCEDURE — 72197 MRI PELVIS W/O & W/DYE: CPT | Mod: TC

## 2019-10-18 PROCEDURE — 74183 MRI UROGRAPHY W W/O CONTRAST: ICD-10-PCS | Mod: 26,,, | Performed by: RADIOLOGY

## 2019-10-18 RX ORDER — GADOBUTROL 604.72 MG/ML
6 INJECTION INTRAVENOUS
Status: COMPLETED | OUTPATIENT
Start: 2019-10-18 | End: 2019-10-18

## 2019-10-18 RX ADMIN — GADOBUTROL 6 ML: 604.72 INJECTION INTRAVENOUS at 10:10

## 2019-12-18 ENCOUNTER — PROCEDURE VISIT (OUTPATIENT)
Dept: UROLOGY | Facility: CLINIC | Age: 64
End: 2019-12-18
Attending: UROLOGY
Payer: COMMERCIAL

## 2019-12-18 VITALS
DIASTOLIC BLOOD PRESSURE: 87 MMHG | HEIGHT: 61 IN | SYSTOLIC BLOOD PRESSURE: 124 MMHG | HEART RATE: 74 BPM | BODY MASS INDEX: 26.06 KG/M2 | WEIGHT: 138 LBS

## 2019-12-18 DIAGNOSIS — C67.2 MALIGNANT NEOPLASM OF LATERAL WALL OF URINARY BLADDER: ICD-10-CM

## 2019-12-18 LAB
BILIRUB SERPL-MCNC: ABNORMAL MG/DL
BLOOD URINE, POC: ABNORMAL
COLOR, POC UA: YELLOW
GLUCOSE UR QL STRIP: ABNORMAL
KETONES UR QL STRIP: ABNORMAL
LEUKOCYTE ESTERASE URINE, POC: ABNORMAL
NITRITE, POC UA: ABNORMAL
PH, POC UA: 5
PROTEIN, POC: ABNORMAL
SPECIFIC GRAVITY, POC UA: 1.02
UROBILINOGEN, POC UA: ABNORMAL

## 2019-12-18 PROCEDURE — 81002 URINALYSIS NONAUTO W/O SCOPE: CPT | Mod: S$GLB,,, | Performed by: UROLOGY

## 2019-12-18 PROCEDURE — 81002 POCT URINE DIPSTICK WITHOUT MICROSCOPE: ICD-10-PCS | Mod: S$GLB,,, | Performed by: UROLOGY

## 2019-12-18 PROCEDURE — 52000 CYSTOSCOPY: ICD-10-PCS | Mod: S$GLB,,, | Performed by: UROLOGY

## 2019-12-18 PROCEDURE — 52000 CYSTOURETHROSCOPY: CPT | Mod: S$GLB,,, | Performed by: UROLOGY

## 2019-12-18 RX ORDER — CIPROFLOXACIN 500 MG/1
500 TABLET ORAL
Status: COMPLETED | OUTPATIENT
Start: 2019-12-18 | End: 2019-12-18

## 2019-12-18 RX ORDER — LIDOCAINE HYDROCHLORIDE 20 MG/ML
JELLY TOPICAL
Status: COMPLETED | OUTPATIENT
Start: 2019-12-18 | End: 2019-12-18

## 2019-12-18 RX ADMIN — CIPROFLOXACIN 500 MG: 500 TABLET ORAL at 11:12

## 2019-12-18 RX ADMIN — LIDOCAINE HYDROCHLORIDE 5 ML: 20 JELLY TOPICAL at 11:12

## 2019-12-18 NOTE — PROCEDURES
"Cystoscopy  Date/Time: 12/18/2019 8:20 AM  Performed by: Whitney Gee MD  Authorized by: Whitney Gee MD     Consent Done?:  Yes (Written)  Time out: Immediately prior to procedure a "time out" was called to verify the correct patient, procedure, equipment, support staff and site/side marked as required.    Indications: history bladder cancer    Position:  Dorsal lithotomy  Anesthesia:  Lidocaine jelly  Patient sedated?: No    Preparation: Patient was prepped and draped in usual sterile fashion      Scope type:  Flexible cystoscope  Stent inserted: No    Stent removed: No    External exam normal: Yes    Digital exam performed: No    Urethra normal: Yes  Bladder neck normal: Bladder neck normal   Bladder normal: Yes      Patient tolerance:  Patient tolerated the procedure well with no immediate complications     Cystoscopy normal. Will change to q6mths cysto.  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

## 2020-02-07 DIAGNOSIS — E89.0 HYPOTHYROIDISM, POSTSURGICAL: Primary | ICD-10-CM

## 2020-02-07 DIAGNOSIS — M81.8 OSTEOPOROSIS, IDIOPATHIC: ICD-10-CM

## 2020-02-07 RX ORDER — ALENDRONATE SODIUM 70 MG/1
TABLET ORAL
Qty: 4 TABLET | Refills: 12 | Status: SHIPPED | OUTPATIENT
Start: 2020-02-07 | End: 2021-02-17

## 2020-02-07 RX ORDER — LEVOTHYROXINE SODIUM 88 UG/1
88 TABLET ORAL DAILY
Qty: 30 TABLET | Refills: 11 | Status: SHIPPED | OUTPATIENT
Start: 2020-02-07 | End: 2021-03-04 | Stop reason: SDUPTHER

## 2020-02-07 RX ORDER — PREDNISONE 5 MG/1
TABLET ORAL
Qty: 45 TABLET | Refills: 11 | Status: SHIPPED | OUTPATIENT
Start: 2020-02-07 | End: 2021-03-04 | Stop reason: SDUPTHER

## 2020-02-07 RX ORDER — FLUDROCORTISONE ACETATE 0.1 MG/1
100 TABLET ORAL DAILY
Qty: 30 TABLET | Refills: 11 | Status: SHIPPED | OUTPATIENT
Start: 2020-02-07 | End: 2021-03-04 | Stop reason: SDUPTHER

## 2020-02-12 NOTE — PATIENT INSTRUCTIONS
Follow up with primary care provider if not improved  Go to ER for new, worse or concerning symptoms    Lip or Mouth Laceration  A laceration is a cut through the skin. When the cut is on the outside of the lip, it may be closed with stitches, surgical tape, or skin glue. Cuts inside the mouth may be sutured or left open, depending on the size. When stitches are used in the mouth, they are usually the kind that dissolve.  A tetanus shot may be given if you are not current on this vaccination and the object that caused the cut may lead to tetanus.    Home care  · If you were given an antibiotic to prevent infection, do not stop taking this medication until you have finished the prescribed course or the healthcare provider tells you to stop.  · The healthcare provider may prescribe medications for pain. Follow instructions for taking these medications.   · Follow the healthcare providers instructions on how to care for the cut.  · Wash your hands with soap and warm water before and after caring for your cut. This helps prevent infection.  · If the cut is inside your mouth, clean the wound by rinsing your mouth after each meal and at bedtime with a mixture of equal parts water and hydrogen peroxide. (Do not swallow!) Or, you can use a cotton swab to apply hydrogen peroxide directly onto the cut.  · Mouth wounds can cause pain when chewing. Soft foods can help with this. If needed, use a local, over-the-counter numbing solution for pain relief, such as one for teething babies. Apply this directly to the wound with a cotton-tip swab or your finger.  · If the wound is bandaged, leave the original bandage in place for 24 hours. Replace it if it becomes wet or dirty. After 24 hours, change it once a day or as directed.  · Clean the wound daily. First, remove the bandage. Then wash the area gently with soap and warm water, or as directed by your childs provider. Use a wet cotton swab to loosen and remove any blood or crust  that forms. After cleaning, apply a thin layer of antibiotic ointment if advised. Then put on a new bandage.  · If the cut is on the outside of the lip and sutures were used, you may shower as usual after the first 24 hours, but do not put your head under water or swim until the sutures are removed. After removing the bandage, wash the area with soap and water. Use a wet cotton swab to loosen and remove any blood or crust that forms. After cleaning, keep the wound clean and dry. Talk with your doctor before applying any antibiotic ointment to the wound. You may apply an adhesive bandage or leave the wound open. Unless told otherwise, sutures on the inside of the mouth will likely dissolve on their own.  · If skin glue was used, do not scratch, rub, or pick at the adhesive film. Do not place tape directly over the film. Do not apply liquid, ointment, or creams to the wound while the film is in place. Do not clean the wound with peroxide and do not apply ointment. Avoid activities that cause heavy sweating until the film has fallen off. Protect the wound from prolonged exposure to sunlight or tanning lamps. You may shower as usual but do not soak the wound in water (no swimming).  Follow-up care  Follow up with your healthcare provider as directed. If you have sutures that won't dissolve on their own, return as directed to have them removed.  When to seek medical advice  Call your healthcare provider right away if any of these occur:  · Wound bleeding not controlled by direct pressure  · Signs of infection, including increasing pain in the wound, increasing wound redness or swelling, or pus or bad odor coming from the wound  · Fever of 100.4°F (38.ºC) or higher or as directed by your healthcare provider  · Stitches come apart or fall out or surgical tape falls off before 5 days  · Wound edges re-open  · Wound changes colors  · Numbness around the wound   Date Last Reviewed: 6/10/2015  © 5848-4494 [employer] [address] [place] [id number]. All rights reserved. This information is not intended as a substitute for professional medical care. Always follow your healthcare professional's instructions.         Yes

## 2020-04-01 ENCOUNTER — PATIENT MESSAGE (OUTPATIENT)
Dept: ENDOCRINOLOGY | Facility: CLINIC | Age: 65
End: 2020-04-01

## 2020-04-30 ENCOUNTER — TELEPHONE (OUTPATIENT)
Dept: ENDOCRINOLOGY | Facility: CLINIC | Age: 65
End: 2020-04-30

## 2020-05-04 ENCOUNTER — PATIENT MESSAGE (OUTPATIENT)
Dept: ENDOCRINOLOGY | Facility: CLINIC | Age: 65
End: 2020-05-04

## 2020-05-04 ENCOUNTER — TELEPHONE (OUTPATIENT)
Dept: ENDOCRINOLOGY | Facility: CLINIC | Age: 65
End: 2020-05-04

## 2020-05-04 NOTE — TELEPHONE ENCOUNTER
Left message for patient that we are only doing Virtual Video apt and I will send her a message threw the portal as well.

## 2020-05-07 ENCOUNTER — OFFICE VISIT (OUTPATIENT)
Dept: ENDOCRINOLOGY | Facility: CLINIC | Age: 65
End: 2020-05-07
Payer: COMMERCIAL

## 2020-05-07 DIAGNOSIS — E27.1 ADRENAL INSUFFICIENCY (ADDISON'S DISEASE): ICD-10-CM

## 2020-05-07 DIAGNOSIS — E23.0 PANHYPOPITUITARISM: Primary | ICD-10-CM

## 2020-05-07 DIAGNOSIS — E89.0 HYPOTHYROIDISM, POSTSURGICAL: ICD-10-CM

## 2020-05-07 DIAGNOSIS — M81.8 OSTEOPOROSIS, IDIOPATHIC: ICD-10-CM

## 2020-05-07 PROCEDURE — 99214 OFFICE O/P EST MOD 30 MIN: CPT | Mod: 95,,, | Performed by: INTERNAL MEDICINE

## 2020-05-07 PROCEDURE — 99214 PR OFFICE/OUTPT VISIT, EST, LEVL IV, 30-39 MIN: ICD-10-PCS | Mod: 95,,, | Performed by: INTERNAL MEDICINE

## 2020-05-07 NOTE — ASSESSMENT & PLAN NOTE
-hypothyroidism post thyroidectomy   -continue levothyroxine 88 mcg daily  -TSH and FT4 with next set of labs

## 2020-05-07 NOTE — ASSESSMENT & PLAN NOTE
Adrenal insufficiency due to radiation to pituitary and b/l adrenalectomy   On prednisone 5 mg daily most days, occ takes 7.5 if having more stress on a given day (rare)  Knows sick day management   Needs prescription for emergency dexamethasone (will send in now)  On florinef - continue current dose   Get CMP and renin level

## 2020-05-07 NOTE — PROGRESS NOTES
Subjective:      Patient ID: Tess Duncan is a 65 y.o. female.    Chief Complaint:  Hypopituitarism and Osteoporosis      History of Present Illness  Ms. Duncan presents for follow up of panhypopituitarism and osteoporosis. Last visit 2019.      New patient to me, previously seen by Beau Pompa and Constantine.     The patient has the following problems:   Cushing's disease , bilateral adrenalectomy that year and pituitary radiation to prevent Dakota's later that year.      # Status post Cushing's disease with bilateral adrenalectomy.   # Radiation to the pituitary to avoid Dakota syndrome.    # Growth hormone deficiency. Off of GH now  Last IGF1 normal, growth hormone was discontinued when levels were normal. Levels now fluctuate between normal and slightly low.  # Osteoporosis and scoliosis.   # Status post thyroidectomy in 2009 with benign goiter, was started on thyroid hormone at that time  Father with thyroid cancer   In regards to the Cushing's and bilateral adrenalectomy, she is currently taking prednisone 5-7.5 mg per day mg per day (takes 5 mg most days, rarely takes 7.5 mg dose). Understands sick day precautions. Emergency dexamethasone prescription has .      Taking DHEA 20 mg daily.     No headaches or blurry vision.      Florinef 0.1 M/W/F/Ascencio  0.05 rest of the week   No hypotension. Normal appetite. No dizziness or lightheadedness.      Reports getting some early AM hypoglycemia before she takes prednisone or eats.      Osteoporosis    One fracture foot fracture  On fosamax since 2017, she often forgets to take it    Review of Systems   Constitutional: Negative for chills and fever.   Gastrointestinal: Negative for nausea.       Objective:   Physical Exam   Constitutional: She appears well-developed and well-nourished. No distress.     BP Readings from Last 3 Encounters:   19 124/87   19 110/86   19 126/86     Wt Readings from Last 1 Encounters:   19 0816 62.6 kg  (138 lb)       There is no height or weight on file to calculate BMI.    Lab Review:   Lab Results   Component Value Date    HGBA1C 5.1 01/21/2015     Lab Results   Component Value Date    CHOL 227 (H) 11/19/2018    HDL 58 11/19/2018    LDLCALC 139.4 11/19/2018    TRIG 148 11/19/2018    CHOLHDL 25.6 11/19/2018     Lab Results   Component Value Date     10/18/2019    K 5.0 10/18/2019     10/18/2019    CO2 28 10/18/2019    GLU 86 10/18/2019    BUN 19 10/18/2019    CREATININE 0.8 10/18/2019    CALCIUM 9.2 10/18/2019    PROT 6.3 11/19/2018    PROT 6.3 11/19/2018    ALBUMIN 3.6 11/19/2018    ALBUMIN 3.6 11/19/2018    BILITOT 0.4 11/19/2018    BILITOT 0.4 11/19/2018    ALKPHOS 58 11/19/2018    ALKPHOS 58 11/19/2018    AST 16 11/19/2018    AST 16 11/19/2018    ALT 14 11/19/2018    ALT 14 11/19/2018    ANIONGAP 9 10/18/2019    ESTGFRAFRICA >60 10/18/2019    EGFRNONAA >60 10/18/2019    TSH 1.511 01/24/2019         Assessment and Plan     Panhypopituitarism  Cushing's disease s/p radiation and b/l adrenalectomy in 1972    Adrenal insufficiency ([place]'s disease)  Adrenal insufficiency due to radiation to pituitary and b/l adrenalectomy   On prednisone 5 mg daily most days, occ takes 7.5 if having more stress on a given day (rare)  Knows sick day management   Needs prescription for emergency dexamethasone (will send in now)  On florinef - continue current dose   Get CMP and renin level     Osteoporosis, idiopathic  No hx of fragility fracture  Bone density showed stability but on different denistometer  Continue weekly alendronate  Offered to change her to daily risedronate as she has trouble remembering to take it weekly, but she would like to stay on weekly alendronate for now and make a better effort to remember taking it  Continue calcium and Vit d supplements  Repeat BMD in 12/2020 at main campus    Hypothyroidism, postsurgical  -hypothyroidism post thyroidectomy   -continue levothyroxine 88 mcg daily  -TSH  and FT4 with next set of labs       The patient location is: [place]  The chief complaint leading to consultation is: osteoporosis, panyhypopit  Visit type: audiovisual  Total time spent with patient: 20 min  Each patient to whom he or she provides medical services by telemedicine is:  (1) informed of the relationship between the physician and patient and the respective role of any other health care provider with respect to management of the patient; and (2) notified that he or she may decline to receive medical services by telemedicine and may withdraw from such care at any time.    Follow up in 6 months    Jose Schilling M.D. Staff Endocrinology

## 2020-05-07 NOTE — ASSESSMENT & PLAN NOTE
No hx of fragility fracture  Bone density showed stability but on different denistometer  Continue weekly alendronate  Offered to change her to daily risedronate as she has trouble remembering to take it weekly, but she would like to stay on weekly alendronate for now and make a better effort to remember taking it  Continue calcium and Vit d supplements  Repeat BMD in 12/2020 at [facility]

## 2020-05-12 ENCOUNTER — PATIENT MESSAGE (OUTPATIENT)
Dept: ENDOCRINOLOGY | Facility: CLINIC | Age: 65
End: 2020-05-12

## 2020-05-12 RX ORDER — DEXAMETHASONE SODIUM PHOSPHATE 4 MG/ML
4 INJECTION, SOLUTION INTRA-ARTICULAR; INTRALESIONAL; INTRAMUSCULAR; INTRAVENOUS; SOFT TISSUE DAILY PRN
Qty: 1 ML | Refills: 1 | Status: SHIPPED | OUTPATIENT
Start: 2020-05-12 | End: 2021-02-17

## 2020-07-16 ENCOUNTER — OFFICE VISIT (OUTPATIENT)
Dept: UROLOGY | Facility: CLINIC | Age: 65
End: 2020-07-16
Payer: COMMERCIAL

## 2020-07-16 VITALS — BODY MASS INDEX: 27.16 KG/M2 | HEIGHT: 61 IN | WEIGHT: 143.88 LBS

## 2020-07-16 DIAGNOSIS — C67.2 MALIGNANT NEOPLASM OF LATERAL WALL OF URINARY BLADDER: ICD-10-CM

## 2020-07-16 DIAGNOSIS — R39.89 SUSPECTED UTI: Primary | ICD-10-CM

## 2020-07-16 PROCEDURE — 99214 OFFICE O/P EST MOD 30 MIN: CPT | Mod: S$GLB,,, | Performed by: UROLOGY

## 2020-07-16 PROCEDURE — 99999 PR PBB SHADOW E&M-EST. PATIENT-LVL III: ICD-10-PCS | Mod: PBBFAC,,, | Performed by: UROLOGY

## 2020-07-16 PROCEDURE — 99999 PR PBB SHADOW E&M-EST. PATIENT-LVL III: CPT | Mod: PBBFAC,,, | Performed by: UROLOGY

## 2020-07-16 PROCEDURE — 99214 PR OFFICE/OUTPT VISIT, EST, LEVL IV, 30-39 MIN: ICD-10-PCS | Mod: S$GLB,,, | Performed by: UROLOGY

## 2020-07-16 PROCEDURE — 87186 SC STD MICRODIL/AGAR DIL: CPT

## 2020-07-16 PROCEDURE — 87077 CULTURE AEROBIC IDENTIFY: CPT

## 2020-07-16 PROCEDURE — 87086 URINE CULTURE/COLONY COUNT: CPT

## 2020-07-16 PROCEDURE — 87088 URINE BACTERIA CULTURE: CPT

## 2020-07-16 RX ORDER — SULFAMETHOXAZOLE AND TRIMETHOPRIM 800; 160 MG/1; MG/1
1 TABLET ORAL 2 TIMES DAILY
Qty: 14 TABLET | Refills: 0 | Status: SHIPPED | OUTPATIENT
Start: 2020-07-16 | End: 2020-07-23

## 2020-07-16 NOTE — PROGRESS NOTES
"  Subjective:       Tess Duncan is a 65 y.o. female who is an established patient who was referred by Dr Crawley for evaluation of recurrent UTIs/bladder cancer.      She was referred by PCP for recurrent UTIs. She is reported to have 5 UTIs in the last year. She says this is "related to stress and caffeine." She thinks this is related to not drinking enough water. She was last seen in 8/17 by Waqas NP for s/s of UTI - dysuria, urgency, hematuria. She was given Macrobid for this. Unfortunately, no UCx was sent at that time.     She noted gross hematuria significantly in the past. Hematuria with clots lasted for several hours. She notes hesitancy in AM occasionally. Denies UI. Denies RAHUL. She has been seeing Urgent Care for UTI so there is very limited data available for review. She reports getting Macrobid each time for varying lengths of time.     Denies h/o nephrolithiasis, constipation. Nocturia x 1-2.    UCx:  10/17 - negative  5/16 - 10-49k E coli    PVR (bladder scan) initial visit - 17cc    She underwent cystoscopy for recurrent UTI/gross hematuria workup that showed a small papillary lesion in L lateral bladder wall.    She is s/p cysto/BBx/RPG on 11/29/17. 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 Mitomycin was not given (as lesion appeared possibly benign (PUNLMP) and possible associated risks with raul). Re-cysto/BBx 1/10/18 was negative for malignancy.     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 Took Pyridium. Dysuria, urgency, frequency. Denies fever, hematuria.     The following portions of the patient's history were reviewed and updated as appropriate: allergies, current medications, past family history, past medical history, past social history, past surgical history and problem list.    Review of Systems  Constitutional: no fever or chills  ENT: no nasal congestion or sore throat  Respiratory: no cough or shortness of " "breath  Cardiovascular: no chest pain or palpitations  Gastrointestinal: no nausea or vomiting, tolerating diet  Genitourinary: as per HPI  Hematologic/Lymphatic: no easy bruising or lymphadenopathy  Musculoskeletal: no arthralgias or myalgias  Skin: no rashes or lesions  Neurological: no seizures or tremors  Behavioral/Psych: no auditory or visual hallucinations        Objective:    Vitals: Ht 5' 1" (1.549 m)   Wt 65.2 kg (143 lb 13.6 oz)   BMI 27.18 kg/m²     Physical Exam   General: well developed, well nourished in no acute distress  Head: normocephalic, atraumatic  Neck: supple, trachea midline, no obvious enlargement of thyroid  HEENT: EOMI, mucus membranes moist, sclera anicteric, no hearing impairment  Lungs: symmetric expansion, non-labored breathing  Cardiovascular: regular rate and rhythm, normal pulses  Abdomen: soft, non tender, non distended, no palpable masses, no hepatosplenomegaly, no hernias, no CVA tenderness  Musculoskeletal: no peripheral edema, normal ROM in bilateral upper and lower extremities  Lymphatics: no cervical or inguinal lymphadenopathy  Skin: no rashes or lesions  Neuro: alert and oriented x 3, no gross deficits  Psych: normal judgment and insight, normal mood/affect and non-anxious  Genitourinary:   patient declined exam      Lab Review   Urine analysis today in clinic shows - pyridium stained     Lab Results   Component Value Date    WBC 6.32 11/24/2017    HGB 14.4 11/24/2017    HCT 44.3 11/24/2017    MCV 94 11/24/2017     11/24/2017     Lab Results   Component Value Date    CREATININE 0.8 10/18/2019    BUN 19 10/18/2019       Imaging  NA       Assessment/Plan:      1. Recurrent UTI    - Discussed UTI prevention strategies.   - Adequate hydration.   - Double voiding. Consider timed voiding.    - Avoid constipation.   - MRI done (without contrast)   - Cystoscopy - bladder tumor   - Cranberry/probiotics    - Estrace cream 2x weekly. Will need to check with PCP regarding " contraindication 2/2 liver hemangioma.    - PVR acceptable   - Call with UTI symptoms so UA/UCx can be sent.    - UCx today     2. Gross hematuria   - Discussed etiology and workup of hematuria   - Likely related to UTI but due to severity would recommend workup   - UCx - UA clear, not indicated today   - Cytology - not indicated   - CT urogram - unable to do 2/2 iodine contrast allergy. Recommend MRI urogram. MR without contrast was done instead.   - Office cystoscopy - bladder tumor    3. 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 Will need to consider immune issues 2/2 adrenal insufficiency. She will be seeing new endo in 2/18.   - Discussed pros and cons of BCG. s/p BCG (started 3/18)   - Annual upper tract surveillance with MR urogram     4. Suspected UTI    - UCx today    - Azo PRN   - Bactrim empiric abx now    Overdue for cysto (due last month), will plan for 2-3 weeks      Follow up in 2-3 weeks cystoscopy at [facility]

## 2020-07-18 LAB — BACTERIA UR CULT: ABNORMAL

## 2020-08-12 ENCOUNTER — PROCEDURE VISIT (OUTPATIENT)
Dept: UROLOGY | Facility: CLINIC | Age: 65
End: 2020-08-12
Attending: UROLOGY
Payer: COMMERCIAL

## 2020-08-12 VITALS
WEIGHT: 143.75 LBS | HEART RATE: 82 BPM | BODY MASS INDEX: 27.14 KG/M2 | DIASTOLIC BLOOD PRESSURE: 61 MMHG | SYSTOLIC BLOOD PRESSURE: 127 MMHG | HEIGHT: 61 IN

## 2020-08-12 DIAGNOSIS — R39.89 SUSPECTED UTI: ICD-10-CM

## 2020-08-12 DIAGNOSIS — C67.2 MALIGNANT NEOPLASM OF LATERAL WALL OF URINARY BLADDER: Primary | ICD-10-CM

## 2020-08-12 LAB
BILIRUB SERPL-MCNC: NEGATIVE MG/DL
BLOOD URINE, POC: NEGATIVE
CLARITY, POC UA: CLEAR
COLOR, POC UA: YELLOW
GLUCOSE UR QL STRIP: NORMAL
KETONES UR QL STRIP: NEGATIVE
LEUKOCYTE ESTERASE URINE, POC: ABNORMAL
NITRITE, POC UA: NEGATIVE
PH, POC UA: 5
PROTEIN, POC: ABNORMAL
SPECIFIC GRAVITY, POC UA: 1.02
UROBILINOGEN, POC UA: NORMAL

## 2020-08-12 PROCEDURE — 81002 POCT URINE DIPSTICK WITHOUT MICROSCOPE: ICD-10-PCS | Mod: S$GLB,,, | Performed by: UROLOGY

## 2020-08-12 PROCEDURE — 52000 CYSTOURETHROSCOPY: CPT | Mod: S$GLB,,, | Performed by: UROLOGY

## 2020-08-12 PROCEDURE — 81002 URINALYSIS NONAUTO W/O SCOPE: CPT | Mod: S$GLB,,, | Performed by: UROLOGY

## 2020-08-12 PROCEDURE — 52000 CYSTOSCOPY: ICD-10-PCS | Mod: S$GLB,,, | Performed by: UROLOGY

## 2020-08-12 RX ORDER — LIDOCAINE HYDROCHLORIDE 20 MG/ML
JELLY TOPICAL
Status: COMPLETED | OUTPATIENT
Start: 2020-08-12 | End: 2020-08-12

## 2020-08-12 RX ORDER — SYRINGE WITH NEEDLE, 1 ML 25GX5/8"
SYRINGE, EMPTY DISPOSABLE MISCELLANEOUS
COMMUNITY
Start: 2020-05-13

## 2020-08-12 RX ORDER — CIPROFLOXACIN 500 MG/1
500 TABLET ORAL
Status: COMPLETED | OUTPATIENT
Start: 2020-08-12 | End: 2020-08-12

## 2020-08-12 RX ADMIN — LIDOCAINE HYDROCHLORIDE: 20 JELLY TOPICAL at 10:08

## 2020-08-12 RX ADMIN — CIPROFLOXACIN 500 MG: 500 TABLET ORAL at 10:08

## 2020-08-12 NOTE — PROCEDURES
"Cystoscopy    Date/Time: 8/12/2020 10:40 AM  Performed by: Whitney Gee MD  Authorized by: Whitney Gee MD     Consent Done?:  Yes (Written)  Time out: Immediately prior to procedure a "time out" was called to verify the correct patient, procedure, equipment, support staff and site/side marked as required.    Indications: history bladder cancer    Position:  Dorsal lithotomy  Anesthesia:  Lidocaine jelly  Patient sedated?: No    Preparation: Patient was prepped and draped in usual sterile fashion      Scope type:  Flexible cystoscope  Stent inserted: No    Stent removed: No    External exam normal: Yes    Digital exam performed: No    Urethra normal: Yes  Bladder neck normal: Bladder neck normal   Bladder normal: Yes      Patient tolerance:  Patient tolerated the procedure well with no immediate complications     Cystoscopy was normal  Repeat 6 months  Recent UTI - no issues noted on cysto.      "

## 2020-09-04 DIAGNOSIS — Z12.39 BREAST CANCER SCREENING: ICD-10-CM

## 2020-10-05 ENCOUNTER — PATIENT MESSAGE (OUTPATIENT)
Dept: ADMINISTRATIVE | Facility: HOSPITAL | Age: 65
End: 2020-10-05

## 2020-10-27 ENCOUNTER — LAB VISIT (OUTPATIENT)
Dept: LAB | Facility: HOSPITAL | Age: 65
End: 2020-10-27
Attending: INTERNAL MEDICINE
Payer: COMMERCIAL

## 2020-10-27 DIAGNOSIS — E23.0 PANHYPOPITUITARISM: ICD-10-CM

## 2020-10-27 DIAGNOSIS — E27.1 ADRENAL INSUFFICIENCY (ADDISON'S DISEASE): ICD-10-CM

## 2020-10-27 LAB
ALBUMIN SERPL BCP-MCNC: 3.8 G/DL (ref 3.5–5.2)
ALP SERPL-CCNC: 71 U/L (ref 55–135)
ALT SERPL W/O P-5'-P-CCNC: 11 U/L (ref 10–44)
ANION GAP SERPL CALC-SCNC: 9 MMOL/L (ref 8–16)
AST SERPL-CCNC: 20 U/L (ref 10–40)
BILIRUB SERPL-MCNC: 0.5 MG/DL (ref 0.1–1)
BUN SERPL-MCNC: 17 MG/DL (ref 8–23)
CALCIUM SERPL-MCNC: 9.2 MG/DL (ref 8.7–10.5)
CHLORIDE SERPL-SCNC: 106 MMOL/L (ref 95–110)
CO2 SERPL-SCNC: 27 MMOL/L (ref 23–29)
CREAT SERPL-MCNC: 0.8 MG/DL (ref 0.5–1.4)
DHEA-S SERPL-MCNC: 263.5 UG/DL (ref 29.7–182.2)
EST. GFR  (AFRICAN AMERICAN): >60 ML/MIN/1.73 M^2
EST. GFR  (NON AFRICAN AMERICAN): >60 ML/MIN/1.73 M^2
GLUCOSE SERPL-MCNC: 81 MG/DL (ref 70–110)
POTASSIUM SERPL-SCNC: 4.3 MMOL/L (ref 3.5–5.1)
PROT SERPL-MCNC: 6.4 G/DL (ref 6–8.4)
SODIUM SERPL-SCNC: 142 MMOL/L (ref 136–145)
T4 FREE SERPL-MCNC: 1.55 NG/DL (ref 0.71–1.51)
TSH SERPL DL<=0.005 MIU/L-ACNC: 0.78 UIU/ML (ref 0.4–4)

## 2020-10-27 PROCEDURE — 84305 ASSAY OF SOMATOMEDIN: CPT

## 2020-10-27 PROCEDURE — 84443 ASSAY THYROID STIM HORMONE: CPT

## 2020-10-27 PROCEDURE — 82627 DEHYDROEPIANDROSTERONE: CPT

## 2020-10-27 PROCEDURE — 84244 ASSAY OF RENIN: CPT

## 2020-10-27 PROCEDURE — 84439 ASSAY OF FREE THYROXINE: CPT

## 2020-10-27 PROCEDURE — 80053 COMPREHEN METABOLIC PANEL: CPT

## 2020-10-27 PROCEDURE — 36415 COLL VENOUS BLD VENIPUNCTURE: CPT

## 2020-10-27 PROCEDURE — 82024 ASSAY OF ACTH: CPT

## 2020-10-30 LAB
ACTH PLAS-MCNC: 8 PG/ML (ref 0–46)
RENIN PLAS-CCNC: 8.8 NG/ML/H

## 2020-11-02 ENCOUNTER — PATIENT MESSAGE (OUTPATIENT)
Dept: ENDOCRINOLOGY | Facility: CLINIC | Age: 65
End: 2020-11-02

## 2020-11-02 LAB
IGF-I SERPL-MCNC: 77 NG/ML (ref 35–201)
IGF-I Z-SCORE SERPL: -0.52 SD

## 2020-11-03 ENCOUNTER — OFFICE VISIT (OUTPATIENT)
Dept: ENDOCRINOLOGY | Facility: CLINIC | Age: 65
End: 2020-11-03
Payer: COMMERCIAL

## 2020-11-03 VITALS
BODY MASS INDEX: 27.12 KG/M2 | DIASTOLIC BLOOD PRESSURE: 75 MMHG | SYSTOLIC BLOOD PRESSURE: 119 MMHG | WEIGHT: 143.63 LBS | HEIGHT: 61 IN

## 2020-11-03 DIAGNOSIS — E23.0 PANHYPOPITUITARISM: Primary | ICD-10-CM

## 2020-11-03 DIAGNOSIS — M81.8 OSTEOPOROSIS, IDIOPATHIC: ICD-10-CM

## 2020-11-03 DIAGNOSIS — E27.1 ADRENAL INSUFFICIENCY (ADDISON'S DISEASE): ICD-10-CM

## 2020-11-03 DIAGNOSIS — E89.0 HYPOTHYROIDISM, POSTSURGICAL: ICD-10-CM

## 2020-11-03 PROCEDURE — 99999 PR PBB SHADOW E&M-EST. PATIENT-LVL IV: ICD-10-PCS | Mod: PBBFAC,,, | Performed by: INTERNAL MEDICINE

## 2020-11-03 PROCEDURE — 99214 PR OFFICE/OUTPT VISIT, EST, LEVL IV, 30-39 MIN: ICD-10-PCS | Mod: S$GLB,,, | Performed by: INTERNAL MEDICINE

## 2020-11-03 PROCEDURE — 99999 PR PBB SHADOW E&M-EST. PATIENT-LVL IV: CPT | Mod: PBBFAC,,, | Performed by: INTERNAL MEDICINE

## 2020-11-03 PROCEDURE — 99214 OFFICE O/P EST MOD 30 MIN: CPT | Mod: S$GLB,,, | Performed by: INTERNAL MEDICINE

## 2020-11-03 NOTE — ASSESSMENT & PLAN NOTE
Adrenal insufficiency due to radiation to pituitary and b/l adrenalectomy   On prednisone 5 mg daily most days, occ takes 7.5 if having more stress on a given day (rare)  Knows sick day management   Has emergency dexamethasone at home and hasn't had to use for years  On florinef - continue current dose   Get CMP and renin level at next visit

## 2020-11-03 NOTE — ASSESSMENT & PLAN NOTE
No hx of fragility fracture  Bone density showed stability but on different denistometer  Continue weekly alendronate  She has been having difficulty remembering to take the weekly alendronate and she thinks changing to a daily med would only exacerbate this  She is due for a bone density now  Discussed possibly changing her to Prolia or Reclast once bone density is back

## 2020-11-03 NOTE — PROGRESS NOTES
"Subjective:      Patient ID: Tess Duncan is a 65 y.o. female.    Chief Complaint:  panhypopituitarism      History of Present Illness  Ms. Duncan presents for follow up of panhypopituitarism and osteoporosis. Last visit 5/2020.      The patient has the following problems:   Cushing's disease 1972, bilateral adrenalectomy that year and pituitary radiation to prevent Dakota's later that year.      # Status post Cushing's disease with bilateral adrenalectomy.   # Radiation to the pituitary to avoid Dakota syndrome.    # Growth hormone deficiency. Off of GH now. GH was discontinued when IGF1 levels were normal. Levels now fluctuate between normal and slightly low.  # Osteoporosis and scoliosis.   # Status post thyroidectomy in 1/2009 with benign goiter, was started on thyroid hormone at that time  Father with thyroid cancer   In regards to the Cushing's and bilateral adrenalectomy, she is currently taking prednisone 5-7.5 mg per day mg per day (takes 5 mg most days, rarely takes 7.5 mg dose). Understands sick day precautions. Has emergency dexamethasone script.      Taking DHEA 20 mg daily when labs were drawn and has since decreased to 10 mg daily.      No headaches or blurry vision.      Florinef 0.05 M/W/F/Ascencio  0.1 rest of the week   No hypotension. Normal appetite. No dizziness or lightheadedness.      Reports getting some early AM hypoglycemia before she takes prednisone or eats.      Osteoporosis    One fracture foot fracture  On fosamax since 2/2017, she often forgets to take it  Due for repeat bone density in 12/2020.      Her primary complaint since her last visit is overt fatigue. She feels as though she has been "dragging". She has no other signs or symptoms associated with this.     Denies snoring. [place] sleepiness scale score of 6.      Review of Systems   Constitutional: Negative for chills and fever.   Gastrointestinal: Negative for nausea.       Objective:   Physical Exam  Vitals signs and nursing " note reviewed.       BP Readings from Last 3 Encounters:   11/03/20 119/75   08/12/20 127/61   12/18/19 124/87     Wt Readings from Last 1 Encounters:   11/03/20 0942 65.2 kg (143 lb 10.1 oz)       Body mass index is 27.14 kg/m².    Lab Review:   Lab Results   Component Value Date    HGBA1C 5.1 01/21/2015     Lab Results   Component Value Date    CHOL 227 (H) 11/19/2018    HDL 58 11/19/2018    LDLCALC 139.4 11/19/2018    TRIG 148 11/19/2018    CHOLHDL 25.6 11/19/2018     Lab Results   Component Value Date     10/27/2020    K 4.3 10/27/2020     10/27/2020    CO2 27 10/27/2020    GLU 81 10/27/2020    BUN 17 10/27/2020    CREATININE 0.8 10/27/2020    CALCIUM 9.2 10/27/2020    PROT 6.4 10/27/2020    ALBUMIN 3.8 10/27/2020    BILITOT 0.5 10/27/2020    ALKPHOS 71 10/27/2020    AST 20 10/27/2020    ALT 11 10/27/2020    ANIONGAP 9 10/27/2020    ESTGFRAFRICA >60.0 10/27/2020    EGFRNONAA >60.0 10/27/2020    TSH 0.781 10/27/2020         Assessment and Plan     Panhypopituitarism  Cushing's disease s/p radiation and b/l adrenalectomy in 1972    Adrenal insufficiency ([place]'s disease)  Adrenal insufficiency due to radiation to pituitary and b/l adrenalectomy   On prednisone 5 mg daily most days, occ takes 7.5 if having more stress on a given day (rare)  Knows sick day management   Has emergency dexamethasone at home and hasn't had to use for years  On florinef - continue current dose   Get CMP and renin level at next visit    Osteoporosis, idiopathic  No hx of fragility fracture  Bone density showed stability but on different denistometer  Continue weekly alendronate  She has been having difficulty remembering to take the weekly alendronate and she thinks changing to a daily med would only exacerbate this  She is due for a bone density now  Discussed possibly changing her to Prolia or Reclast once bone density is back    Hypothyroidism, postsurgical  -hypothyroidism post thyroidectomy   -continue levothyroxine 88  mcg daily  -TSH and FT4 at next visit         Bone density in 12/2020, RTC in 6 months with labs prior to appt      Jose Schilling M.D. Staff Endocrinology

## 2020-11-03 NOTE — ASSESSMENT & PLAN NOTE
-hypothyroidism post thyroidectomy   -continue levothyroxine 88 mcg daily  -TSH and FT4 at next visit

## 2020-12-14 ENCOUNTER — HOSPITAL ENCOUNTER (OUTPATIENT)
Dept: RADIOLOGY | Facility: CLINIC | Age: 65
Discharge: HOME | End: 2020-12-14
Attending: INTERNAL MEDICINE
Payer: COMMERCIAL

## 2020-12-14 DIAGNOSIS — E27.1 ADRENAL INSUFFICIENCY (ADDISON'S DISEASE): ICD-10-CM

## 2020-12-14 DIAGNOSIS — E23.0 PANHYPOPITUITARISM: ICD-10-CM

## 2020-12-14 DIAGNOSIS — M81.8 OSTEOPOROSIS, IDIOPATHIC: ICD-10-CM

## 2020-12-14 PROCEDURE — 77080 DXA BONE DENSITY AXIAL: CPT | Mod: 26,,, | Performed by: INTERNAL MEDICINE

## 2020-12-14 PROCEDURE — 77080 DEXA BONE DENSITY SPINE HIP: ICD-10-PCS | Mod: 26,,, | Performed by: INTERNAL MEDICINE

## 2020-12-14 PROCEDURE — 77080 DXA BONE DENSITY AXIAL: CPT | Mod: TC

## 2020-12-18 ENCOUNTER — PATIENT MESSAGE (OUTPATIENT)
Dept: ENDOCRINOLOGY | Facility: CLINIC | Age: 65
End: 2020-12-18

## 2021-02-17 ENCOUNTER — OFFICE VISIT (OUTPATIENT)
Dept: ORTHOPEDICS | Facility: CLINIC | Age: 66
End: 2021-02-17
Payer: COMMERCIAL

## 2021-02-17 ENCOUNTER — PATIENT MESSAGE (OUTPATIENT)
Dept: ORTHOPEDICS | Facility: CLINIC | Age: 66
End: 2021-02-17

## 2021-02-17 ENCOUNTER — HOSPITAL ENCOUNTER (OUTPATIENT)
Dept: RADIOLOGY | Facility: HOSPITAL | Age: 66
Discharge: HOME | End: 2021-02-17
Attending: PHYSICIAN ASSISTANT
Payer: COMMERCIAL

## 2021-02-17 ENCOUNTER — PROCEDURE VISIT (OUTPATIENT)
Dept: UROLOGY | Facility: CLINIC | Age: 66
End: 2021-02-17
Attending: UROLOGY
Payer: COMMERCIAL

## 2021-02-17 VITALS
WEIGHT: 141.63 LBS | HEIGHT: 61 IN | BODY MASS INDEX: 26.74 KG/M2 | SYSTOLIC BLOOD PRESSURE: 124 MMHG | HEART RATE: 75 BPM | DIASTOLIC BLOOD PRESSURE: 69 MMHG

## 2021-02-17 VITALS
DIASTOLIC BLOOD PRESSURE: 72 MMHG | WEIGHT: 141 LBS | HEART RATE: 76 BPM | HEIGHT: 61 IN | SYSTOLIC BLOOD PRESSURE: 98 MMHG | BODY MASS INDEX: 26.62 KG/M2

## 2021-02-17 DIAGNOSIS — M25.312 INSTABILITY OF LEFT SHOULDER JOINT: ICD-10-CM

## 2021-02-17 DIAGNOSIS — C67.2 MALIGNANT NEOPLASM OF LATERAL WALL OF URINARY BLADDER: ICD-10-CM

## 2021-02-17 DIAGNOSIS — M25.512 LEFT SHOULDER PAIN, UNSPECIFIED CHRONICITY: ICD-10-CM

## 2021-02-17 DIAGNOSIS — M25.512 LEFT SHOULDER PAIN, UNSPECIFIED CHRONICITY: Primary | ICD-10-CM

## 2021-02-17 LAB
BILIRUB SERPL-MCNC: ABNORMAL MG/DL
BLOOD URINE, POC: ABNORMAL
CLARITY, POC UA: CLEAR
COLOR, POC UA: YELLOW
GLUCOSE UR QL STRIP: NORMAL
KETONES UR QL STRIP: ABNORMAL
LEUKOCYTE ESTERASE URINE, POC: ABNORMAL
NITRITE, POC UA: ABNORMAL
PH, POC UA: 6
PROTEIN, POC: ABNORMAL
SPECIFIC GRAVITY, POC UA: 1
UROBILINOGEN, POC UA: NORMAL

## 2021-02-17 PROCEDURE — 52000 CYSTOURETHROSCOPY: CPT | Mod: S$GLB,,, | Performed by: UROLOGY

## 2021-02-17 PROCEDURE — 52000 CYSTOSCOPY: ICD-10-PCS | Mod: S$GLB,,, | Performed by: UROLOGY

## 2021-02-17 PROCEDURE — 99999 PR PBB SHADOW E&M-EST. PATIENT-LVL V: ICD-10-PCS | Mod: PBBFAC,,, | Performed by: PHYSICIAN ASSISTANT

## 2021-02-17 PROCEDURE — 73030 X-RAY EXAM OF SHOULDER: CPT | Mod: TC,LT

## 2021-02-17 PROCEDURE — 99999 PR PBB SHADOW E&M-EST. PATIENT-LVL V: CPT | Mod: PBBFAC,,, | Performed by: PHYSICIAN ASSISTANT

## 2021-02-17 PROCEDURE — 73030 X-RAY EXAM OF SHOULDER: CPT | Mod: 26,LT,, | Performed by: RADIOLOGY

## 2021-02-17 PROCEDURE — 99203 OFFICE O/P NEW LOW 30 MIN: CPT | Mod: S$GLB,,, | Performed by: PHYSICIAN ASSISTANT

## 2021-02-17 PROCEDURE — 99203 PR OFFICE/OUTPT VISIT, NEW, LEVL III, 30-44 MIN: ICD-10-PCS | Mod: S$GLB,,, | Performed by: PHYSICIAN ASSISTANT

## 2021-02-17 PROCEDURE — 73030 XR SHOULDER COMPLETE 2 OR MORE VIEWS LEFT: ICD-10-PCS | Mod: 26,LT,, | Performed by: RADIOLOGY

## 2021-02-17 PROCEDURE — 81002 URINALYSIS NONAUTO W/O SCOPE: CPT | Mod: S$GLB,,, | Performed by: UROLOGY

## 2021-02-17 PROCEDURE — 81002 POCT URINE DIPSTICK WITHOUT MICROSCOPE: ICD-10-PCS | Mod: S$GLB,,, | Performed by: UROLOGY

## 2021-02-17 RX ORDER — CIPROFLOXACIN 500 MG/1
500 TABLET ORAL
Status: COMPLETED | OUTPATIENT
Start: 2021-02-17 | End: 2021-02-17

## 2021-02-17 RX ORDER — LIDOCAINE HYDROCHLORIDE 20 MG/ML
JELLY TOPICAL
Status: COMPLETED | OUTPATIENT
Start: 2021-02-17 | End: 2021-02-17

## 2021-02-17 RX ADMIN — CIPROFLOXACIN 500 MG: 500 TABLET ORAL at 02:02

## 2021-02-17 RX ADMIN — LIDOCAINE HYDROCHLORIDE 5 ML: 20 JELLY TOPICAL at 02:02

## 2021-02-18 DIAGNOSIS — M25.512 LEFT SHOULDER PAIN, UNSPECIFIED CHRONICITY: Primary | ICD-10-CM

## 2021-02-18 RX ORDER — IBUPROFEN 800 MG/1
800 TABLET ORAL
Qty: 90 TABLET | Refills: 0 | Status: SHIPPED | OUTPATIENT
Start: 2021-02-18 | End: 2021-02-28

## 2021-02-24 ENCOUNTER — PATIENT MESSAGE (OUTPATIENT)
Dept: INTERNAL MEDICINE | Facility: CLINIC | Age: 66
End: 2021-02-24

## 2021-02-24 DIAGNOSIS — Z00.00 ANNUAL PHYSICAL EXAM: ICD-10-CM

## 2021-02-24 DIAGNOSIS — D18.03 HEMANGIOMA OF LIVER: Primary | ICD-10-CM

## 2021-03-01 ENCOUNTER — HOSPITAL ENCOUNTER (OUTPATIENT)
Dept: RADIOLOGY | Facility: OTHER | Age: 66
Discharge: HOME | End: 2021-03-01
Attending: INTERNAL MEDICINE
Payer: COMMERCIAL

## 2021-03-01 DIAGNOSIS — D18.03 HEMANGIOMA OF LIVER: ICD-10-CM

## 2021-03-01 PROCEDURE — 76705 ECHO EXAM OF ABDOMEN: CPT | Mod: TC

## 2021-03-01 PROCEDURE — 76705 US ABDOMEN LIMITED: ICD-10-PCS | Mod: 26,,, | Performed by: INTERNAL MEDICINE

## 2021-03-01 PROCEDURE — 76705 ECHO EXAM OF ABDOMEN: CPT | Mod: 26,,, | Performed by: INTERNAL MEDICINE

## 2021-03-12 ENCOUNTER — IMMUNIZATION (OUTPATIENT)
Dept: PRIMARY CARE CLINIC | Facility: CLINIC | Age: 66
End: 2021-03-12
Payer: COMMERCIAL

## 2021-03-12 DIAGNOSIS — Z23 NEED FOR VACCINATION: Primary | ICD-10-CM

## 2021-03-12 PROCEDURE — 0001A PR IMMUNIZ ADMIN, SARS-COV-2 COVID-19 VACC, 30MCG/0.3ML, 1ST DOSE: ICD-10-PCS | Mod: CV19,S$GLB,, | Performed by: INTERNAL MEDICINE

## 2021-03-12 PROCEDURE — 91300 PR SARS-COV- 2 COVID-19 VACCINE, NO PRSV, 30MCG/0.3ML, IM: ICD-10-PCS | Mod: S$GLB,,, | Performed by: INTERNAL MEDICINE

## 2021-03-12 PROCEDURE — 91300 PR SARS-COV- 2 COVID-19 VACCINE, NO PRSV, 30MCG/0.3ML, IM: CPT | Mod: S$GLB,,, | Performed by: INTERNAL MEDICINE

## 2021-03-12 PROCEDURE — 0001A PR IMMUNIZ ADMIN, SARS-COV-2 COVID-19 VACC, 30MCG/0.3ML, 1ST DOSE: CPT | Mod: CV19,S$GLB,, | Performed by: INTERNAL MEDICINE

## 2021-03-12 RX ADMIN — Medication 0.3 ML: at 11:03

## 2021-04-02 ENCOUNTER — IMMUNIZATION (OUTPATIENT)
Dept: PRIMARY CARE CLINIC | Facility: CLINIC | Age: 66
End: 2021-04-02
Payer: COMMERCIAL

## 2021-04-02 DIAGNOSIS — Z23 NEED FOR VACCINATION: Primary | ICD-10-CM

## 2021-04-02 PROCEDURE — 0002A PR IMMUNIZ ADMIN, SARS-COV-2 COVID-19 VACC, 30MCG/0.3ML, 2ND DOSE: ICD-10-PCS | Mod: CV19,S$GLB,, | Performed by: INTERNAL MEDICINE

## 2021-04-02 PROCEDURE — 0002A PR IMMUNIZ ADMIN, SARS-COV-2 COVID-19 VACC, 30MCG/0.3ML, 2ND DOSE: CPT | Mod: CV19,S$GLB,, | Performed by: INTERNAL MEDICINE

## 2021-04-02 PROCEDURE — 91300 PR SARS-COV- 2 COVID-19 VACCINE, NO PRSV, 30MCG/0.3ML, IM: CPT | Mod: S$GLB,,, | Performed by: INTERNAL MEDICINE

## 2021-04-02 PROCEDURE — 91300 PR SARS-COV- 2 COVID-19 VACCINE, NO PRSV, 30MCG/0.3ML, IM: ICD-10-PCS | Mod: S$GLB,,, | Performed by: INTERNAL MEDICINE

## 2021-04-02 RX ADMIN — Medication 0.3 ML: at 12:04

## 2021-05-17 ENCOUNTER — OFFICE VISIT (OUTPATIENT)
Dept: ENDOCRINOLOGY | Facility: CLINIC | Age: 66
End: 2021-05-17
Attending: INTERNAL MEDICINE
Payer: COMMERCIAL

## 2021-05-17 ENCOUNTER — PATIENT MESSAGE (OUTPATIENT)
Dept: ENDOCRINOLOGY | Facility: CLINIC | Age: 66
End: 2021-05-17

## 2021-05-17 VITALS
HEART RATE: 77 BPM | SYSTOLIC BLOOD PRESSURE: 121 MMHG | WEIGHT: 134.13 LBS | DIASTOLIC BLOOD PRESSURE: 56 MMHG | BODY MASS INDEX: 25.32 KG/M2 | HEIGHT: 61 IN

## 2021-05-17 DIAGNOSIS — E27.1 ADRENAL INSUFFICIENCY (ADDISON'S DISEASE): ICD-10-CM

## 2021-05-17 DIAGNOSIS — M81.8 OSTEOPOROSIS, IDIOPATHIC: ICD-10-CM

## 2021-05-17 DIAGNOSIS — E89.0 HYPOTHYROIDISM, POSTSURGICAL: ICD-10-CM

## 2021-05-17 DIAGNOSIS — E23.0 PANHYPOPITUITARISM: Primary | ICD-10-CM

## 2021-05-17 PROCEDURE — 99214 PR OFFICE/OUTPT VISIT, EST, LEVL IV, 30-39 MIN: ICD-10-PCS | Mod: S$GLB,,, | Performed by: INTERNAL MEDICINE

## 2021-05-17 PROCEDURE — 99214 OFFICE O/P EST MOD 30 MIN: CPT | Mod: S$GLB,,, | Performed by: INTERNAL MEDICINE

## 2021-05-17 RX ORDER — ZOLEDRONIC ACID 5 MG/100ML
5 INJECTION, SOLUTION INTRAVENOUS
Status: CANCELLED | OUTPATIENT
Start: 2021-05-17

## 2021-05-17 RX ORDER — SODIUM CHLORIDE 0.9 % (FLUSH) 0.9 %
10 SYRINGE (ML) INJECTION
Status: CANCELLED | OUTPATIENT
Start: 2021-05-17

## 2021-05-17 RX ORDER — HEPARIN 100 UNIT/ML
500 SYRINGE INTRAVENOUS
Status: CANCELLED | OUTPATIENT
Start: 2021-05-17

## 2021-05-17 RX ORDER — ACETAMINOPHEN 500 MG
500 TABLET ORAL
Status: CANCELLED | OUTPATIENT
Start: 2021-05-17

## 2021-05-21 ENCOUNTER — LAB VISIT (OUTPATIENT)
Dept: LAB | Facility: OTHER | Age: 66
End: 2021-05-21
Payer: COMMERCIAL

## 2021-05-21 DIAGNOSIS — E89.0 HYPOTHYROIDISM, POSTSURGICAL: ICD-10-CM

## 2021-05-21 DIAGNOSIS — E27.1 ADRENAL INSUFFICIENCY (ADDISON'S DISEASE): ICD-10-CM

## 2021-05-21 DIAGNOSIS — E23.0 PANHYPOPITUITARISM: ICD-10-CM

## 2021-05-21 DIAGNOSIS — M81.8 OSTEOPOROSIS, IDIOPATHIC: ICD-10-CM

## 2021-05-21 LAB
ALBUMIN SERPL BCP-MCNC: 3.9 G/DL (ref 3.5–5.2)
ALP SERPL-CCNC: 73 U/L (ref 55–135)
ALT SERPL W/O P-5'-P-CCNC: 9 U/L (ref 10–44)
ANION GAP SERPL CALC-SCNC: 9 MMOL/L (ref 8–16)
AST SERPL-CCNC: 15 U/L (ref 10–40)
BILIRUB SERPL-MCNC: 0.5 MG/DL (ref 0.1–1)
BUN SERPL-MCNC: 18 MG/DL (ref 8–23)
CALCIUM SERPL-MCNC: 8.7 MG/DL (ref 8.7–10.5)
CHLORIDE SERPL-SCNC: 105 MMOL/L (ref 95–110)
CO2 SERPL-SCNC: 26 MMOL/L (ref 23–29)
CREAT SERPL-MCNC: 0.9 MG/DL (ref 0.5–1.4)
DHEA-S SERPL-MCNC: 291.6 UG/DL (ref 29.7–182.2)
EST. GFR  (AFRICAN AMERICAN): >60 ML/MIN/1.73 M^2
EST. GFR  (NON AFRICAN AMERICAN): >60 ML/MIN/1.73 M^2
GLUCOSE SERPL-MCNC: 88 MG/DL (ref 70–110)
POTASSIUM SERPL-SCNC: 4.5 MMOL/L (ref 3.5–5.1)
PROT SERPL-MCNC: 6.6 G/DL (ref 6–8.4)
SODIUM SERPL-SCNC: 140 MMOL/L (ref 136–145)
T4 FREE SERPL-MCNC: 1.42 NG/DL (ref 0.71–1.51)
TSH SERPL DL<=0.005 MIU/L-ACNC: 0.55 UIU/ML (ref 0.4–4)

## 2021-05-21 PROCEDURE — 82024 ASSAY OF ACTH: CPT | Performed by: GENERAL ACUTE CARE HOSPITAL

## 2021-05-21 PROCEDURE — 84439 ASSAY OF FREE THYROXINE: CPT | Performed by: GENERAL ACUTE CARE HOSPITAL

## 2021-05-21 PROCEDURE — 36415 COLL VENOUS BLD VENIPUNCTURE: CPT | Performed by: GENERAL ACUTE CARE HOSPITAL

## 2021-05-21 PROCEDURE — 80053 COMPREHEN METABOLIC PANEL: CPT | Performed by: GENERAL ACUTE CARE HOSPITAL

## 2021-05-21 PROCEDURE — 84244 ASSAY OF RENIN: CPT | Performed by: GENERAL ACUTE CARE HOSPITAL

## 2021-05-21 PROCEDURE — 84443 ASSAY THYROID STIM HORMONE: CPT | Performed by: GENERAL ACUTE CARE HOSPITAL

## 2021-05-21 PROCEDURE — 82627 DEHYDROEPIANDROSTERONE: CPT | Performed by: GENERAL ACUTE CARE HOSPITAL

## 2021-05-21 PROCEDURE — 84305 ASSAY OF SOMATOMEDIN: CPT | Performed by: GENERAL ACUTE CARE HOSPITAL

## 2021-05-24 ENCOUNTER — PATIENT MESSAGE (OUTPATIENT)
Dept: ENDOCRINOLOGY | Facility: CLINIC | Age: 66
End: 2021-05-24

## 2021-05-24 ENCOUNTER — TELEPHONE (OUTPATIENT)
Dept: INFECTIOUS DISEASES | Facility: HOSPITAL | Age: 66
End: 2021-05-24

## 2021-05-25 LAB
ACTH PLAS-MCNC: <5 PG/ML (ref 0–46)
IGF-I SERPL-MCNC: 93 NG/ML (ref 35–201)
IGF-I Z-SCORE SERPL: -0.13 SD
RENIN PLAS-CCNC: 4.1 NG/ML/H

## 2021-05-25 NOTE — PROCEDURES
"Cystoscopy  Date/Time: 6/19/2019 8:24 AM  Performed by: Whitney Gee MD  Authorized by: Ching Rico NP     Consent Done?:  Yes (Written)  Time out: Immediately prior to procedure a "time out" was called to verify the correct patient, procedure, equipment, support staff and site/side marked as required.    Indications: history bladder cancer    Position:  Dorsal lithotomy  Anesthesia:  Lidocaine jelly  Patient sedated?: No    Preparation: Patient was prepped and draped in usual sterile fashion      Scope type:  Flexible cystoscope  Stent inserted: No    Stent removed: No    External exam normal: Yes    Digital exam performed: No    Urethra normal: Yes  Bladder neck normal: Bladder neck normal   Bladder normal: Yes      Patient tolerance:  Patient tolerated the procedure well with no immediate complications     Normal cystoscopy today.  Continue q3mth cysto for now, change in q6mth in 12/19      " Detail Level: Simple Plan: Polysporin Ointment twice a day

## 2021-06-02 ENCOUNTER — INFUSION (OUTPATIENT)
Dept: INFECTIOUS DISEASES | Facility: HOSPITAL | Age: 66
End: 2021-06-02
Attending: INTERNAL MEDICINE
Payer: COMMERCIAL

## 2021-06-02 VITALS
TEMPERATURE: 98 F | OXYGEN SATURATION: 99 % | RESPIRATION RATE: 18 BRPM | HEART RATE: 87 BPM | BODY MASS INDEX: 27.26 KG/M2 | SYSTOLIC BLOOD PRESSURE: 130 MMHG | HEIGHT: 61 IN | WEIGHT: 144.38 LBS | DIASTOLIC BLOOD PRESSURE: 65 MMHG

## 2021-06-02 DIAGNOSIS — M81.8 OSTEOPOROSIS, IDIOPATHIC: Primary | ICD-10-CM

## 2021-06-02 PROCEDURE — 63600175 PHARM REV CODE 636 W HCPCS: Performed by: GENERAL ACUTE CARE HOSPITAL

## 2021-06-02 PROCEDURE — 96365 THER/PROPH/DIAG IV INF INIT: CPT

## 2021-06-02 PROCEDURE — 25000003 PHARM REV CODE 250: Performed by: GENERAL ACUTE CARE HOSPITAL

## 2021-06-02 RX ORDER — SODIUM CHLORIDE 0.9 % (FLUSH) 0.9 %
10 SYRINGE (ML) INJECTION
Status: CANCELLED | OUTPATIENT
Start: 2021-06-02

## 2021-06-02 RX ORDER — HEPARIN 100 UNIT/ML
500 SYRINGE INTRAVENOUS
Status: DISCONTINUED | OUTPATIENT
Start: 2021-06-02 | End: 2021-06-02 | Stop reason: HOSPADM

## 2021-06-02 RX ORDER — ZOLEDRONIC ACID 5 MG/100ML
5 INJECTION, SOLUTION INTRAVENOUS
Status: COMPLETED | OUTPATIENT
Start: 2021-06-02 | End: 2021-06-02

## 2021-06-02 RX ORDER — ACETAMINOPHEN 500 MG
500 TABLET ORAL
Status: CANCELLED | OUTPATIENT
Start: 2021-06-02

## 2021-06-02 RX ORDER — HEPARIN 100 UNIT/ML
500 SYRINGE INTRAVENOUS
Status: CANCELLED | OUTPATIENT
Start: 2021-06-02

## 2021-06-02 RX ORDER — SODIUM CHLORIDE 0.9 % (FLUSH) 0.9 %
10 SYRINGE (ML) INJECTION
Status: DISCONTINUED | OUTPATIENT
Start: 2021-06-02 | End: 2021-06-02 | Stop reason: HOSPADM

## 2021-06-02 RX ORDER — ACETAMINOPHEN 500 MG
500 TABLET ORAL
Status: DISCONTINUED | OUTPATIENT
Start: 2021-06-02 | End: 2021-06-02 | Stop reason: HOSPADM

## 2021-06-02 RX ORDER — ZOLEDRONIC ACID 5 MG/100ML
5 INJECTION, SOLUTION INTRAVENOUS
Status: CANCELLED | OUTPATIENT
Start: 2021-06-02

## 2021-06-02 RX ADMIN — ZOLEDRONIC ACID 5 MG: 5 INJECTION, SOLUTION INTRAVENOUS at 09:06

## 2021-06-02 RX ADMIN — ACETAMINOPHEN 500 MG: 500 TABLET ORAL at 08:06

## 2021-08-18 ENCOUNTER — PROCEDURE VISIT (OUTPATIENT)
Dept: UROLOGY | Facility: CLINIC | Age: 66
End: 2021-08-18
Attending: UROLOGY
Payer: COMMERCIAL

## 2021-08-18 VITALS
HEIGHT: 61 IN | BODY MASS INDEX: 27.26 KG/M2 | DIASTOLIC BLOOD PRESSURE: 86 MMHG | HEART RATE: 81 BPM | SYSTOLIC BLOOD PRESSURE: 137 MMHG | WEIGHT: 144.38 LBS

## 2021-08-18 DIAGNOSIS — C67.2 MALIGNANT NEOPLASM OF LATERAL WALL OF URINARY BLADDER: ICD-10-CM

## 2021-08-18 PROCEDURE — 52000 CYSTOURETHROSCOPY: CPT | Mod: S$GLB,,, | Performed by: UROLOGY

## 2021-08-18 PROCEDURE — 52000 CYSTOSCOPY: ICD-10-PCS | Mod: S$GLB,,, | Performed by: UROLOGY

## 2021-08-18 RX ORDER — CIPROFLOXACIN 500 MG/1
500 TABLET ORAL
Status: COMPLETED | OUTPATIENT
Start: 2021-08-18 | End: 2021-08-18

## 2021-08-18 RX ORDER — LIDOCAINE HYDROCHLORIDE 20 MG/ML
JELLY TOPICAL
Status: COMPLETED | OUTPATIENT
Start: 2021-08-18 | End: 2021-08-18

## 2021-08-18 RX ADMIN — CIPROFLOXACIN 500 MG: 500 TABLET ORAL at 08:08

## 2021-08-18 RX ADMIN — LIDOCAINE HYDROCHLORIDE: 20 JELLY TOPICAL at 08:08

## 2021-10-05 ENCOUNTER — PATIENT MESSAGE (OUTPATIENT)
Dept: ADMINISTRATIVE | Facility: HOSPITAL | Age: 66
End: 2021-10-05

## 2021-11-23 ENCOUNTER — OFFICE VISIT (OUTPATIENT)
Dept: UROLOGY | Facility: CLINIC | Age: 66
End: 2021-11-23
Payer: COMMERCIAL

## 2021-11-23 VITALS — HEIGHT: 60 IN | BODY MASS INDEX: 28.74 KG/M2 | WEIGHT: 146.38 LBS

## 2021-11-23 DIAGNOSIS — R39.89 SUSPECTED UTI: Primary | ICD-10-CM

## 2021-11-23 DIAGNOSIS — C67.2 MALIGNANT NEOPLASM OF LATERAL WALL OF URINARY BLADDER: ICD-10-CM

## 2021-11-23 LAB
BILIRUB SERPL-MCNC: NORMAL MG/DL
BLOOD URINE, POC: NORMAL
COLOR, POC UA: YELLOW
GLUCOSE UR QL STRIP: NORMAL
KETONES UR QL STRIP: NORMAL
LEUKOCYTE ESTERASE URINE, POC: NORMAL
NITRITE, POC UA: POSITIVE
PH, POC UA: 5
PROTEIN, POC: NORMAL
SPECIFIC GRAVITY, POC UA: 1000
UROBILINOGEN, POC UA: NORMAL

## 2021-11-23 PROCEDURE — 99999 PR PBB SHADOW E&M-EST. PATIENT-LVL III: CPT | Mod: PBBFAC,,, | Performed by: UROLOGY

## 2021-11-23 PROCEDURE — 87186 SC STD MICRODIL/AGAR DIL: CPT | Performed by: UROLOGY

## 2021-11-23 PROCEDURE — 87077 CULTURE AEROBIC IDENTIFY: CPT | Performed by: UROLOGY

## 2021-11-23 PROCEDURE — 87088 URINE BACTERIA CULTURE: CPT | Performed by: UROLOGY

## 2021-11-23 PROCEDURE — 99213 PR OFFICE/OUTPT VISIT, EST, LEVL III, 20-29 MIN: ICD-10-PCS | Mod: 25,S$GLB,, | Performed by: UROLOGY

## 2021-11-23 PROCEDURE — 99213 OFFICE O/P EST LOW 20 MIN: CPT | Mod: 25,S$GLB,, | Performed by: UROLOGY

## 2021-11-23 PROCEDURE — 99999 PR PBB SHADOW E&M-EST. PATIENT-LVL III: ICD-10-PCS | Mod: PBBFAC,,, | Performed by: UROLOGY

## 2021-11-23 PROCEDURE — 81001 URINALYSIS AUTO W/SCOPE: CPT | Mod: S$GLB,,, | Performed by: UROLOGY

## 2021-11-23 PROCEDURE — 87086 URINE CULTURE/COLONY COUNT: CPT | Performed by: UROLOGY

## 2021-11-23 PROCEDURE — 81001 POCT URINALYSIS, DIPSTICK OR TABLET REAGENT, AUTOMATED, WITH MICROSCOP: ICD-10-PCS | Mod: S$GLB,,, | Performed by: UROLOGY

## 2021-11-23 RX ORDER — SULFAMETHOXAZOLE AND TRIMETHOPRIM 800; 160 MG/1; MG/1
1 TABLET ORAL 2 TIMES DAILY
Qty: 14 TABLET | Refills: 0 | Status: SHIPPED | OUTPATIENT
Start: 2021-11-23 | End: 2021-11-30

## 2021-11-25 LAB — BACTERIA UR CULT: ABNORMAL

## 2021-11-26 ENCOUNTER — TELEPHONE (OUTPATIENT)
Dept: UROLOGY | Facility: CLINIC | Age: 66
End: 2021-11-26
Payer: COMMERCIAL

## 2021-12-11 ENCOUNTER — IMMUNIZATION (OUTPATIENT)
Dept: INTERNAL MEDICINE | Facility: CLINIC | Age: 66
End: 2021-12-11
Payer: COMMERCIAL

## 2021-12-11 PROCEDURE — 90471 FLU VACCINE (QUAD) GREATER THAN OR EQUAL TO 3YO PRESERVATIVE FREE IM: ICD-10-PCS | Mod: S$GLB,,, | Performed by: INTERNAL MEDICINE

## 2021-12-11 PROCEDURE — 90471 IMMUNIZATION ADMIN: CPT | Mod: S$GLB,,, | Performed by: INTERNAL MEDICINE

## 2021-12-11 PROCEDURE — 90686 FLU VACCINE (QUAD) GREATER THAN OR EQUAL TO 3YO PRESERVATIVE FREE IM: ICD-10-PCS | Mod: S$GLB,,, | Performed by: INTERNAL MEDICINE

## 2021-12-11 PROCEDURE — 90686 IIV4 VACC NO PRSV 0.5 ML IM: CPT | Mod: S$GLB,,, | Performed by: INTERNAL MEDICINE

## 2022-02-23 ENCOUNTER — PROCEDURE VISIT (OUTPATIENT)
Dept: UROLOGY | Facility: CLINIC | Age: 67
End: 2022-02-23
Attending: UROLOGY
Payer: COMMERCIAL

## 2022-02-23 VITALS
HEART RATE: 92 BPM | RESPIRATION RATE: 18 BRPM | TEMPERATURE: 98 F | DIASTOLIC BLOOD PRESSURE: 70 MMHG | SYSTOLIC BLOOD PRESSURE: 112 MMHG

## 2022-02-23 DIAGNOSIS — C67.9 MALIGNANT NEOPLASM OF URINARY BLADDER, UNSPECIFIED SITE: Primary | ICD-10-CM

## 2022-02-23 DIAGNOSIS — C67.2 MALIGNANT NEOPLASM OF LATERAL WALL OF URINARY BLADDER: ICD-10-CM

## 2022-02-23 LAB
BILIRUB SERPL-MCNC: NEGATIVE MG/DL
BLOOD URINE, POC: NEGATIVE
CLARITY, POC UA: CLEAR
COLOR, POC UA: ABNORMAL
GLUCOSE UR QL STRIP: NORMAL
KETONES UR QL STRIP: NEGATIVE
LEUKOCYTE ESTERASE URINE, POC: NEGATIVE
NITRITE, POC UA: NEGATIVE
PH, POC UA: 7
PROTEIN, POC: ABNORMAL
SPECIFIC GRAVITY, POC UA: 1
UROBILINOGEN, POC UA: NORMAL

## 2022-02-23 PROCEDURE — 52000 CYSTOURETHROSCOPY: CPT | Mod: S$GLB,,, | Performed by: UROLOGY

## 2022-02-23 PROCEDURE — 81002 URINALYSIS NONAUTO W/O SCOPE: CPT | Mod: S$GLB,,, | Performed by: UROLOGY

## 2022-02-23 PROCEDURE — 52000 CYSTOSCOPY: ICD-10-PCS | Mod: S$GLB,,, | Performed by: UROLOGY

## 2022-02-23 PROCEDURE — 81002 POCT URINE DIPSTICK WITHOUT MICROSCOPE: ICD-10-PCS | Mod: S$GLB,,, | Performed by: UROLOGY

## 2022-02-23 RX ORDER — LIDOCAINE HYDROCHLORIDE 20 MG/ML
JELLY TOPICAL
Status: COMPLETED | OUTPATIENT
Start: 2022-02-23 | End: 2022-02-23

## 2022-02-23 RX ORDER — CEPHALEXIN 500 MG/1
500 CAPSULE ORAL
Status: COMPLETED | OUTPATIENT
Start: 2022-02-23 | End: 2022-02-23

## 2022-02-23 RX ADMIN — LIDOCAINE HYDROCHLORIDE 1 ML: 20 JELLY TOPICAL at 02:02

## 2022-02-23 RX ADMIN — CEPHALEXIN 500 MG: 500 CAPSULE ORAL at 03:02

## 2022-02-23 NOTE — PROCEDURES
"Cystoscopy    Date/Time: 2/23/2022 1:00 PM  Performed by: Whitney Gee MD  Authorized by: Whitney Gee MD     Consent Done?:  Yes (Written)  Time out: Immediately prior to procedure a "time out" was called to verify the correct patient, procedure, equipment, support staff and site/side marked as required.    Indications: history bladder cancer    Position:  Dorsal lithotomy  Anesthesia:  Lidocaine jelly  Patient sedated?: No    Preparation: Patient was prepped and draped in usual sterile fashion      Scope type:  Flexible cystoscope  Stent inserted: No    Stent removed: No    External exam normal: Yes    Digital exam performed: No    Urethra normal: Yes  Bladder neck normal: Bladder neck normal   Bladder normal: Yes      Patient tolerance:  Patient tolerated the procedure well with no immediate complications     RTC 1 year for surveillance cystoscopy.      "

## 2022-05-04 ENCOUNTER — PATIENT MESSAGE (OUTPATIENT)
Dept: ENDOCRINOLOGY | Facility: CLINIC | Age: 67
End: 2022-05-04
Payer: COMMERCIAL

## 2022-06-14 ENCOUNTER — INFUSION (OUTPATIENT)
Dept: INFECTIOUS DISEASES | Facility: HOSPITAL | Age: 67
End: 2022-06-14
Attending: INTERNAL MEDICINE
Payer: COMMERCIAL

## 2022-06-14 VITALS
OXYGEN SATURATION: 97 % | TEMPERATURE: 97 F | RESPIRATION RATE: 18 BRPM | SYSTOLIC BLOOD PRESSURE: 122 MMHG | DIASTOLIC BLOOD PRESSURE: 59 MMHG | WEIGHT: 143.06 LBS | BODY MASS INDEX: 27.94 KG/M2 | HEART RATE: 90 BPM

## 2022-06-14 DIAGNOSIS — M81.8 OSTEOPOROSIS, IDIOPATHIC: Primary | ICD-10-CM

## 2022-06-14 PROCEDURE — 96365 THER/PROPH/DIAG IV INF INIT: CPT

## 2022-06-14 PROCEDURE — 25000003 PHARM REV CODE 250: Performed by: GENERAL ACUTE CARE HOSPITAL

## 2022-06-14 PROCEDURE — 63600175 PHARM REV CODE 636 W HCPCS: Performed by: GENERAL ACUTE CARE HOSPITAL

## 2022-06-14 RX ORDER — SODIUM CHLORIDE 0.9 % (FLUSH) 0.9 %
10 SYRINGE (ML) INJECTION
Status: CANCELLED | OUTPATIENT
Start: 2022-06-14

## 2022-06-14 RX ORDER — ACETAMINOPHEN 500 MG
500 TABLET ORAL
Status: CANCELLED | OUTPATIENT
Start: 2022-06-14

## 2022-06-14 RX ORDER — ZOLEDRONIC ACID 5 MG/100ML
5 INJECTION, SOLUTION INTRAVENOUS
Status: COMPLETED | OUTPATIENT
Start: 2022-06-14 | End: 2022-06-14

## 2022-06-14 RX ORDER — ZOLEDRONIC ACID 5 MG/100ML
5 INJECTION, SOLUTION INTRAVENOUS
Status: CANCELLED | OUTPATIENT
Start: 2022-06-14

## 2022-06-14 RX ORDER — ACETAMINOPHEN 500 MG
500 TABLET ORAL
Status: DISCONTINUED | OUTPATIENT
Start: 2022-06-14 | End: 2022-06-14 | Stop reason: HOSPADM

## 2022-06-14 RX ORDER — HEPARIN 100 UNIT/ML
500 SYRINGE INTRAVENOUS
Status: CANCELLED | OUTPATIENT
Start: 2022-06-14

## 2022-06-14 RX ADMIN — ACETAMINOPHEN 500 MG: 500 TABLET ORAL at 03:06

## 2022-06-14 RX ADMIN — ZOLEDRONIC ACID 5 MG: 5 INJECTION, SOLUTION INTRAVENOUS at 03:06

## 2022-06-14 NOTE — PROGRESS NOTES
Pt received tylenol 500mg po, reclast infusion started 30 min later, pt tolerated well & left in NAD

## 2022-06-29 ENCOUNTER — PATIENT MESSAGE (OUTPATIENT)
Dept: ENDOCRINOLOGY | Facility: CLINIC | Age: 67
End: 2022-06-29
Payer: COMMERCIAL

## 2022-07-06 ENCOUNTER — OFFICE VISIT (OUTPATIENT)
Dept: ENDOCRINOLOGY | Facility: CLINIC | Age: 67
End: 2022-07-06
Payer: COMMERCIAL

## 2022-07-06 DIAGNOSIS — E27.1 ADRENAL INSUFFICIENCY (ADDISON'S DISEASE): ICD-10-CM

## 2022-07-06 DIAGNOSIS — E23.0 PANHYPOPITUITARISM: Primary | ICD-10-CM

## 2022-07-06 DIAGNOSIS — E89.0 HYPOTHYROIDISM, POSTSURGICAL: ICD-10-CM

## 2022-07-06 DIAGNOSIS — M81.8 OSTEOPOROSIS, IDIOPATHIC: ICD-10-CM

## 2022-07-06 PROCEDURE — 99214 OFFICE O/P EST MOD 30 MIN: CPT | Mod: 95,,, | Performed by: INTERNAL MEDICINE

## 2022-07-06 PROCEDURE — 99214 PR OFFICE/OUTPT VISIT, EST, LEVL IV, 30-39 MIN: ICD-10-PCS | Mod: 95,,, | Performed by: INTERNAL MEDICINE

## 2022-07-06 RX ORDER — CYCLOBENZAPRINE HCL 5 MG
5 TABLET ORAL 3 TIMES DAILY PRN
Qty: 15 TABLET | Refills: 0 | Status: SHIPPED | OUTPATIENT
Start: 2022-07-06 | End: 2022-07-16

## 2022-07-06 NOTE — ASSESSMENT & PLAN NOTE
No hx of fragility fracture    Was on Fosamax from 2017 to 6/2021, started on Reclast in 6/2021 and received 2nd infusion in 6/2022    Repeat Reclast infusion in 6/2023    Due for BMD scan in 12/2022

## 2022-07-06 NOTE — ASSESSMENT & PLAN NOTE
Adrenal insufficiency due to radiation to pituitary and b/l adrenalectomy   On prednisone 5 mg daily most days, occ takes 1.25mg if having more stress on a given day   Knows sick day management   Has emergency dexamethasone at home and hasn't had to use for years    Recommend to continue taking prednisone 5 mg in am and in afternoon can take additional 1/4 of a tablet (1.25 mg). Continue with current florinef dose (goal renin >1).  Renin level with next labs

## 2022-07-06 NOTE — ASSESSMENT & PLAN NOTE
Cushing's disease s/p radiation and b/l adrenalectomy in 1972  On prednisone and florinef replacement  Was on GH in past but stopped once diagnosed with bladder cancer and IGF-1 levels have been low normal  Taking DHEAS at 10 mg daily

## 2022-07-06 NOTE — ASSESSMENT & PLAN NOTE
-hypothyroidism post thyroidectomy   -continue levothyroxine 88 mcg daily  -Repeat TSH and FT4 now

## 2022-07-06 NOTE — PROGRESS NOTES
Subjective:      Patient ID: Tess Duncan is a 67 y.o. female.    Chief Complaint:  No chief complaint on file.    The patient location is: Home  The chief complaint leading to consultation is: Panhypopituitarism    Visit type: audiovisual    Face to Face time with patient: 15 min  20 minutes of total time spent on the encounter, which includes face to face time and non-face to face time preparing to see the patient (eg, review of tests), Obtaining and/or reviewing separately obtained history, Documenting clinical information in the electronic or other health record, Independently interpreting results (not separately reported) and communicating results to the patient/family/caregiver, or Care coordination (not separately reported).     Each patient to whom he or she provides medical services by telemedicine is:  (1) informed of the relationship between the physician and patient and the respective role of any other health care provider with respect to management of the patient; and (2) notified that he or she may decline to receive medical services by telemedicine and may withdraw from such care at any time.    History of Present Illness  Ms. Duncan presents for follow up of panhypopituitarism and osteoporosis. Last visit 5/2021.      The patient has the following problems:   Cushing's disease 1972, bilateral adrenalectomy that year and pituitary radiation to prevent Dakota's later that year.      # Status post Cushing's disease with bilateral adrenalectomy.   # Radiation to the pituitary to avoid Dakota syndrome.    # Growth hormone deficiency. Off of GH now. GH was discontinued when IGF1 levels were normal. IGF-1 levels now fluctuate between normal and slightly low. Hx of bladder cancer and per pt been monitor for now.  # Osteoporosis and scoliosis.   # Status post thyroidectomy in 1/2009 with benign goiter, was started on thyroid hormone at that time  Father with thyroid cancer   In regards to the Cushing's  and bilateral adrenalectomy, she is currently taking prednisone 5-6.25 mg per day mg per day (rarely takes an additional quarter tablet if not feeling well). Understands sick day precautions. Has emergency dexamethasone script.      Taking DHEA 10 mg daily.     No headaches  Does report double vision seen by optometry in 2018, noted to have intermittent diplopia on far right gaze and on far left gaze, likely secondary to decompensated esophoria     Florinef 0.05 M/W/F/Ascencio  0.1 rest of the week   No hypotension. Normal appetite. No dizziness or lightheadedness.      Osteoporosis    One fracture foot fracture  Previously on fosamax since 2/2017, but was started on Reclast after last visit and received doses in 6/2021 and 6/2022.      Due for repeat bone density in 12/2022.      Review of Systems   Constitutional: Negative for chills and fever.   Gastrointestinal: Negative for nausea.       Objective:   Physical Exam  Constitutional:       General: She is not in acute distress.     Appearance: Normal appearance. She is not ill-appearing.   Neurological:      Mental Status: She is alert.       BP Readings from Last 3 Encounters:   06/14/22 (!) 122/59   02/23/22 112/70   08/18/21 137/86     Wt Readings from Last 1 Encounters:   06/14/22 1515 64.9 kg (143 lb 1.3 oz)       There is no height or weight on file to calculate BMI.    Lab Review:   Lab Results   Component Value Date    HGBA1C 5.1 01/21/2015     Lab Results   Component Value Date    CHOL 221 (H) 03/01/2021    HDL 62 03/01/2021    LDLCALC 137.8 03/01/2021    TRIG 106 03/01/2021    CHOLHDL 28.1 03/01/2021     Lab Results   Component Value Date     05/21/2021    K 4.5 05/21/2021     05/21/2021    CO2 26 05/21/2021    GLU 88 05/21/2021    BUN 18 05/21/2021    CREATININE 0.9 05/21/2021    CALCIUM 8.7 05/21/2021    PROT 6.6 05/21/2021    ALBUMIN 3.9 05/21/2021    BILITOT 0.5 05/21/2021    ALKPHOS 73 05/21/2021    AST 15 05/21/2021    ALT 9 (L) 05/21/2021     ANIONGAP 9 05/21/2021    ESTGFRAFRICA >60 05/21/2021    EGFRNONAA >60 05/21/2021    TSH 0.550 05/21/2021         Assessment and Plan     Panhypopituitarism  Cushing's disease s/p radiation and b/l adrenalectomy in 1972  On prednisone and florinef replacement  Was on GH in past but stopped once diagnosed with bladder cancer and IGF-1 levels have been low normal  Taking DHEAS at 10 mg daily    Adrenal insufficiency ([place]'s disease)  Adrenal insufficiency due to radiation to pituitary and b/l adrenalectomy   On prednisone 5 mg daily most days, occ takes 1.25mg if having more stress on a given day   Knows sick day management   Has emergency dexamethasone at home and hasn't had to use for years    Recommend to continue taking prednisone 5 mg in am and in afternoon can take additional 1/4 of a tablet (1.25 mg). Continue with current florinef dose (goal renin >1).  Renin level with next labs    Osteoporosis, idiopathic  No hx of fragility fracture    Was on Fosamax from 2017 to 6/2021, started on Reclast in 6/2021 and received 2nd infusion in 6/2022    Repeat Reclast infusion in 6/2023    Due for BMD scan in 12/2022      Hypothyroidism, postsurgical  -hypothyroidism post thyroidectomy   -continue levothyroxine 88 mcg daily  -Repeat TSH and FT4 now         Needs labs when able, [facility] with follow       Jose Schilling M.D. Staff Endocrinology

## 2022-07-08 ENCOUNTER — PATIENT MESSAGE (OUTPATIENT)
Dept: ENDOCRINOLOGY | Facility: CLINIC | Age: 67
End: 2022-07-08
Payer: COMMERCIAL

## 2022-07-08 DIAGNOSIS — E23.0 PANHYPOPITUITARISM: Primary | ICD-10-CM

## 2022-07-12 ENCOUNTER — PATIENT MESSAGE (OUTPATIENT)
Dept: ENDOCRINOLOGY | Facility: CLINIC | Age: 67
End: 2022-07-12
Payer: COMMERCIAL

## 2022-07-18 ENCOUNTER — LAB VISIT (OUTPATIENT)
Dept: LAB | Facility: OTHER | Age: 67
End: 2022-07-18
Attending: INTERNAL MEDICINE
Payer: COMMERCIAL

## 2022-07-18 DIAGNOSIS — E27.1 ADRENAL INSUFFICIENCY (ADDISON'S DISEASE): ICD-10-CM

## 2022-07-18 DIAGNOSIS — E23.0 PANHYPOPITUITARISM: ICD-10-CM

## 2022-07-18 LAB
DHEA-S SERPL-MCNC: 90.8 UG/DL (ref 33.6–78.9)
T4 FREE SERPL-MCNC: 1.41 NG/DL (ref 0.71–1.51)
TSH SERPL DL<=0.005 MIU/L-ACNC: 1 UIU/ML (ref 0.4–4)

## 2022-07-18 PROCEDURE — 36415 COLL VENOUS BLD VENIPUNCTURE: CPT | Performed by: INTERNAL MEDICINE

## 2022-07-18 PROCEDURE — 84443 ASSAY THYROID STIM HORMONE: CPT | Performed by: INTERNAL MEDICINE

## 2022-07-18 PROCEDURE — 82627 DEHYDROEPIANDROSTERONE: CPT | Performed by: INTERNAL MEDICINE

## 2022-07-18 PROCEDURE — 84439 ASSAY OF FREE THYROXINE: CPT | Performed by: INTERNAL MEDICINE

## 2022-07-18 PROCEDURE — 84244 ASSAY OF RENIN: CPT | Performed by: INTERNAL MEDICINE

## 2022-07-18 PROCEDURE — 84305 ASSAY OF SOMATOMEDIN: CPT | Performed by: INTERNAL MEDICINE

## 2022-07-21 LAB
IGF-I SERPL-MCNC: 85 NG/ML (ref 35–201)
IGF-I Z-SCORE SERPL: -0.25 SD
RENIN PLAS-CCNC: 11 NG/ML/H

## 2022-07-27 ENCOUNTER — PATIENT MESSAGE (OUTPATIENT)
Dept: ENDOCRINOLOGY | Facility: CLINIC | Age: 67
End: 2022-07-27
Payer: COMMERCIAL

## 2022-07-27 DIAGNOSIS — E89.0 HYPOTHYROIDISM, POSTSURGICAL: ICD-10-CM

## 2022-07-27 DIAGNOSIS — E23.0 PANHYPOPITUITARISM: Primary | ICD-10-CM

## 2022-07-27 DIAGNOSIS — E27.1 ADRENAL INSUFFICIENCY (ADDISON'S DISEASE): ICD-10-CM

## 2022-07-27 DIAGNOSIS — M81.8 OSTEOPOROSIS, IDIOPATHIC: ICD-10-CM

## 2022-12-16 ENCOUNTER — HOSPITAL ENCOUNTER (OUTPATIENT)
Dept: RADIOLOGY | Facility: CLINIC | Age: 67
Discharge: HOME | End: 2022-12-16
Attending: INTERNAL MEDICINE
Payer: COMMERCIAL

## 2022-12-16 DIAGNOSIS — M81.8 OSTEOPOROSIS, IDIOPATHIC: ICD-10-CM

## 2022-12-16 PROCEDURE — 77080 DXA BONE DENSITY AXIAL: CPT | Mod: 26,,, | Performed by: INTERNAL MEDICINE

## 2022-12-16 PROCEDURE — 77080 DXA BONE DENSITY AXIAL: CPT | Mod: TC

## 2022-12-16 PROCEDURE — 77080 DEXA BONE DENSITY SPINE HIP: ICD-10-PCS | Mod: 26,,, | Performed by: INTERNAL MEDICINE

## 2022-12-22 ENCOUNTER — PATIENT MESSAGE (OUTPATIENT)
Dept: ENDOCRINOLOGY | Facility: CLINIC | Age: 67
End: 2022-12-22
Payer: COMMERCIAL

## 2023-01-06 ENCOUNTER — HOSPITAL ENCOUNTER (OUTPATIENT)
Dept: RADIOLOGY | Facility: OTHER | Age: 68
Discharge: HOME | End: 2023-01-06
Attending: INTERNAL MEDICINE
Payer: COMMERCIAL

## 2023-01-06 ENCOUNTER — PATIENT MESSAGE (OUTPATIENT)
Dept: ENDOCRINOLOGY | Facility: CLINIC | Age: 68
End: 2023-01-06
Payer: COMMERCIAL

## 2023-01-06 DIAGNOSIS — M81.8 OSTEOPOROSIS, IDIOPATHIC: ICD-10-CM

## 2023-01-06 DIAGNOSIS — M81.8 OSTEOPOROSIS, IDIOPATHIC: Primary | ICD-10-CM

## 2023-01-06 PROCEDURE — 72100 X-RAY EXAM L-S SPINE 2/3 VWS: CPT | Mod: TC,FY

## 2023-01-06 PROCEDURE — 72100 XR LUMBAR SPINE AP AND LATERAL: ICD-10-PCS | Mod: 26,,, | Performed by: RADIOLOGY

## 2023-01-06 PROCEDURE — 72100 X-RAY EXAM L-S SPINE 2/3 VWS: CPT | Mod: 26,,, | Performed by: RADIOLOGY

## 2023-01-10 ENCOUNTER — PATIENT MESSAGE (OUTPATIENT)
Dept: ENDOCRINOLOGY | Facility: CLINIC | Age: 68
End: 2023-01-10
Payer: COMMERCIAL

## 2023-02-22 ENCOUNTER — TELEPHONE (OUTPATIENT)
Dept: UROLOGY | Facility: HOSPITAL | Age: 68
End: 2023-02-22
Payer: COMMERCIAL

## 2023-02-22 DIAGNOSIS — C67.2 MALIGNANT NEOPLASM OF LATERAL WALL OF URINARY BLADDER: Primary | ICD-10-CM

## 2023-02-22 NOTE — TELEPHONE ENCOUNTER
Pt scheduled for follow up appt through [facility]. Needs to be changed to cysto appt at same time. Thanks.

## 2023-03-01 ENCOUNTER — PATIENT MESSAGE (OUTPATIENT)
Dept: ENDOCRINOLOGY | Facility: CLINIC | Age: 68
End: 2023-03-01
Payer: COMMERCIAL

## 2023-03-08 ENCOUNTER — PROCEDURE VISIT (OUTPATIENT)
Dept: UROLOGY | Facility: CLINIC | Age: 68
End: 2023-03-08
Attending: UROLOGY
Payer: COMMERCIAL

## 2023-03-08 ENCOUNTER — LAB VISIT (OUTPATIENT)
Dept: LAB | Facility: OTHER | Age: 68
End: 2023-03-08
Attending: INTERNAL MEDICINE
Payer: COMMERCIAL

## 2023-03-08 VITALS — HEART RATE: 85 BPM | SYSTOLIC BLOOD PRESSURE: 114 MMHG | DIASTOLIC BLOOD PRESSURE: 57 MMHG

## 2023-03-08 DIAGNOSIS — E27.1 ADRENAL INSUFFICIENCY (ADDISON'S DISEASE): ICD-10-CM

## 2023-03-08 DIAGNOSIS — M81.8 OSTEOPOROSIS, IDIOPATHIC: ICD-10-CM

## 2023-03-08 DIAGNOSIS — C67.2 MALIGNANT NEOPLASM OF LATERAL WALL OF URINARY BLADDER: ICD-10-CM

## 2023-03-08 DIAGNOSIS — E23.0 PANHYPOPITUITARISM: ICD-10-CM

## 2023-03-08 DIAGNOSIS — E89.0 HYPOTHYROIDISM, POSTSURGICAL: ICD-10-CM

## 2023-03-08 LAB
T4 FREE SERPL-MCNC: 1.61 NG/DL (ref 0.71–1.51)
TSH SERPL DL<=0.005 MIU/L-ACNC: 1.03 UIU/ML (ref 0.4–4)

## 2023-03-08 PROCEDURE — 84443 ASSAY THYROID STIM HORMONE: CPT | Performed by: INTERNAL MEDICINE

## 2023-03-08 PROCEDURE — 84439 ASSAY OF FREE THYROXINE: CPT | Performed by: INTERNAL MEDICINE

## 2023-03-08 PROCEDURE — 84305 ASSAY OF SOMATOMEDIN: CPT | Performed by: INTERNAL MEDICINE

## 2023-03-08 PROCEDURE — 52000 CYSTOURETHROSCOPY: CPT | Mod: S$GLB,,, | Performed by: UROLOGY

## 2023-03-08 PROCEDURE — 36415 COLL VENOUS BLD VENIPUNCTURE: CPT | Performed by: INTERNAL MEDICINE

## 2023-03-08 PROCEDURE — 52000 CYSTOSCOPY: ICD-10-PCS | Mod: S$GLB,,, | Performed by: UROLOGY

## 2023-03-08 PROCEDURE — 82627 DEHYDROEPIANDROSTERONE: CPT | Performed by: INTERNAL MEDICINE

## 2023-03-08 PROCEDURE — 84244 ASSAY OF RENIN: CPT | Performed by: INTERNAL MEDICINE

## 2023-03-08 RX ORDER — CIPROFLOXACIN 500 MG/1
500 TABLET ORAL
Status: COMPLETED | OUTPATIENT
Start: 2023-03-08 | End: 2023-03-08

## 2023-03-08 RX ORDER — LIDOCAINE HYDROCHLORIDE 20 MG/ML
JELLY TOPICAL
Status: COMPLETED | OUTPATIENT
Start: 2023-03-08 | End: 2023-03-08

## 2023-03-08 RX ADMIN — LIDOCAINE HYDROCHLORIDE 1 ML: 20 JELLY TOPICAL at 04:03

## 2023-03-08 RX ADMIN — CIPROFLOXACIN 500 MG: 500 TABLET ORAL at 04:03

## 2023-03-08 NOTE — PROCEDURES
Cystoscopy    Date/Time: 3/8/2023 3:40 PM  Performed by: Whitney Gee MD  Authorized by: Whitney Gee MD     Consent Done?:  Yes (Written)  Timeout: prior to procedure the correct patient, procedure, and site was verified    Prep: patient was prepped and draped in usual sterile fashion    Anesthesia:  Lidocaine jelly  Indications: history bladder cancer    Position:  Dorsal lithotomy  Anesthesia:  Lidocaine jelly  Patient sedated?: No    Preparation: Patient was prepped and draped in usual sterile fashion    Scope type:  Flexible cystoscope  Stent inserted: No    Stent removed: No    External exam normal: Yes (Retracted urethra. Vaginal atrophy.)    Urethra normal: Yes    Bladder neck normal: Yes    Bladder normal: Yes     patient tolerated the procedure well with no immediate complications  Comments:        Normal cystoscopy  RTC 1 year for cysto

## 2023-03-09 LAB — DHEA-S SERPL-MCNC: 144.1 UG/DL (ref 33.6–78.9)

## 2023-03-13 LAB
IGF-I SERPL-MCNC: 51 NG/ML (ref 34–194)
IGF-I Z-SCORE SERPL: -1.28 SD

## 2023-03-14 ENCOUNTER — OFFICE VISIT (OUTPATIENT)
Dept: ENDOCRINOLOGY | Facility: CLINIC | Age: 68
End: 2023-03-14
Payer: COMMERCIAL

## 2023-03-14 VITALS
BODY MASS INDEX: 26.55 KG/M2 | SYSTOLIC BLOOD PRESSURE: 104 MMHG | HEIGHT: 60 IN | DIASTOLIC BLOOD PRESSURE: 72 MMHG | WEIGHT: 135.25 LBS

## 2023-03-14 DIAGNOSIS — M81.8 OSTEOPOROSIS, IDIOPATHIC: ICD-10-CM

## 2023-03-14 DIAGNOSIS — E27.1 ADRENAL INSUFFICIENCY (ADDISON'S DISEASE): ICD-10-CM

## 2023-03-14 DIAGNOSIS — E23.0 PANHYPOPITUITARISM: Primary | ICD-10-CM

## 2023-03-14 DIAGNOSIS — E89.0 HYPOTHYROIDISM, POSTSURGICAL: ICD-10-CM

## 2023-03-14 PROCEDURE — 1101F PT FALLS ASSESS-DOCD LE1/YR: CPT | Mod: CPTII,S$GLB,, | Performed by: INTERNAL MEDICINE

## 2023-03-14 PROCEDURE — 1126F AMNT PAIN NOTED NONE PRSNT: CPT | Mod: CPTII,S$GLB,, | Performed by: INTERNAL MEDICINE

## 2023-03-14 PROCEDURE — 1159F MED LIST DOCD IN RCRD: CPT | Mod: CPTII,S$GLB,, | Performed by: INTERNAL MEDICINE

## 2023-03-14 PROCEDURE — 3008F BODY MASS INDEX DOCD: CPT | Mod: CPTII,S$GLB,, | Performed by: INTERNAL MEDICINE

## 2023-03-14 PROCEDURE — 3074F PR MOST RECENT SYSTOLIC BLOOD PRESSURE < 130 MM HG: ICD-10-PCS | Mod: CPTII,S$GLB,, | Performed by: INTERNAL MEDICINE

## 2023-03-14 PROCEDURE — 1159F PR MEDICATION LIST DOCUMENTED IN MEDICAL RECORD: ICD-10-PCS | Mod: CPTII,S$GLB,, | Performed by: INTERNAL MEDICINE

## 2023-03-14 PROCEDURE — 99214 PR OFFICE/OUTPT VISIT, EST, LEVL IV, 30-39 MIN: ICD-10-PCS | Mod: S$GLB,,, | Performed by: INTERNAL MEDICINE

## 2023-03-14 PROCEDURE — 3288F PR FALLS RISK ASSESSMENT DOCUMENTED: ICD-10-PCS | Mod: CPTII,S$GLB,, | Performed by: INTERNAL MEDICINE

## 2023-03-14 PROCEDURE — 3288F FALL RISK ASSESSMENT DOCD: CPT | Mod: CPTII,S$GLB,, | Performed by: INTERNAL MEDICINE

## 2023-03-14 PROCEDURE — 3008F PR BODY MASS INDEX (BMI) DOCUMENTED: ICD-10-PCS | Mod: CPTII,S$GLB,, | Performed by: INTERNAL MEDICINE

## 2023-03-14 PROCEDURE — 99999 PR PBB SHADOW E&M-EST. PATIENT-LVL IV: ICD-10-PCS | Mod: PBBFAC,,, | Performed by: INTERNAL MEDICINE

## 2023-03-14 PROCEDURE — 3078F PR MOST RECENT DIASTOLIC BLOOD PRESSURE < 80 MM HG: ICD-10-PCS | Mod: CPTII,S$GLB,, | Performed by: INTERNAL MEDICINE

## 2023-03-14 PROCEDURE — 3078F DIAST BP <80 MM HG: CPT | Mod: CPTII,S$GLB,, | Performed by: INTERNAL MEDICINE

## 2023-03-14 PROCEDURE — 3074F SYST BP LT 130 MM HG: CPT | Mod: CPTII,S$GLB,, | Performed by: INTERNAL MEDICINE

## 2023-03-14 PROCEDURE — 1160F RVW MEDS BY RX/DR IN RCRD: CPT | Mod: CPTII,S$GLB,, | Performed by: INTERNAL MEDICINE

## 2023-03-14 PROCEDURE — 99999 PR PBB SHADOW E&M-EST. PATIENT-LVL IV: CPT | Mod: PBBFAC,,, | Performed by: INTERNAL MEDICINE

## 2023-03-14 PROCEDURE — 99214 OFFICE O/P EST MOD 30 MIN: CPT | Mod: S$GLB,,, | Performed by: INTERNAL MEDICINE

## 2023-03-14 PROCEDURE — 1126F PR PAIN SEVERITY QUANTIFIED, NO PAIN PRESENT: ICD-10-PCS | Mod: CPTII,S$GLB,, | Performed by: INTERNAL MEDICINE

## 2023-03-14 PROCEDURE — 1101F PR PT FALLS ASSESS DOC 0-1 FALLS W/OUT INJ PAST YR: ICD-10-PCS | Mod: CPTII,S$GLB,, | Performed by: INTERNAL MEDICINE

## 2023-03-14 PROCEDURE — 1160F PR REVIEW ALL MEDS BY PRESCRIBER/CLIN PHARMACIST DOCUMENTED: ICD-10-PCS | Mod: CPTII,S$GLB,, | Performed by: INTERNAL MEDICINE

## 2023-03-14 RX ORDER — LEVOTHYROXINE SODIUM 88 UG/1
88 TABLET ORAL
Qty: 30 TABLET | Refills: 11 | Status: SHIPPED | OUTPATIENT
Start: 2023-03-14 | End: 2024-03-11

## 2023-03-14 RX ORDER — DEXAMETHASONE SODIUM PHOSPHATE 4 MG/ML
4 INJECTION, SOLUTION INTRA-ARTICULAR; INTRALESIONAL; INTRAMUSCULAR; INTRAVENOUS; SOFT TISSUE DAILY PRN
Qty: 1 ML | Refills: 1 | Status: SHIPPED | OUTPATIENT
Start: 2023-03-14 | End: 2023-06-15

## 2023-03-14 RX ORDER — FLUDROCORTISONE ACETATE 0.1 MG/1
100 TABLET ORAL DAILY
Qty: 30 TABLET | Refills: 11 | Status: SHIPPED | OUTPATIENT
Start: 2023-03-14

## 2023-03-14 RX ORDER — PREDNISONE 5 MG/1
TABLET ORAL
Qty: 45 TABLET | Refills: 11 | Status: SHIPPED | OUTPATIENT
Start: 2023-03-14 | End: 2024-04-02

## 2023-03-14 NOTE — ASSESSMENT & PLAN NOTE
Had spontaneous age indeterminate compression fracture of L2 first noted in 12/2022    Was on Fosamax from 2017 to 6/2021, started on Reclast in 6/2021 and received 2nd infusion in 6/2022    Repeat Reclast infusion in 6/2023, we did discuss possibly starting an anabolic agent given L2 fracture but she would like to get her 3rd infusion of Reclast then see what her bone density looks like in 12/2023 before making a decision    Will repeat BMD in 12/2023

## 2023-03-14 NOTE — ASSESSMENT & PLAN NOTE
Adrenal insufficiency due to radiation to pituitary and b/l adrenalectomy   On prednisone 5 mg daily most days, occ takes 1.25mg if having more stress on a given day   Knows sick day management   Has emergency dexamethasone at home and hasn't had to use for years    Recommend to continue taking prednisone 5 mg in am and in afternoon can take additional 1/4 of a tablet (1.25 mg) if needed. Continue with current florinef dose (goal renin >1).  Renin level pending

## 2023-03-14 NOTE — PROGRESS NOTES
Subjective:      Patient ID: Tess Duncan is a 68 y.o. female.    Chief Complaint:  Osteoporosis      History of Present Illness  Ms. Duncan presents for follow up of panhypopituitarism and osteoporosis. Last visit 7/2022.      The patient has the following problems:   Cushing's disease 1972, bilateral adrenalectomy that year and pituitary radiation to prevent Dakota's later that year.      # Status post Cushing's disease with bilateral adrenalectomy.   # Radiation to the pituitary to avoid Dakota syndrome.    # Growth hormone deficiency. Off of GH now. GH was discontinued when IGF1 levels were normal. IGF-1 levels now fluctuate between normal and slightly low. Hx of bladder cancer and per pt been monitor for now.  # Osteoporosis and scoliosis.   # Status post thyroidectomy in 1/2009 with benign goiter, was started on thyroid hormone at that time  Father with thyroid cancer   In regards to the Cushing's and bilateral adrenalectomy, she is currently taking prednisone 5-6.25 mg per day mg per day (rarely takes an additional quarter tablet if not feeling well). Understands sick day precautions. Has emergency dexamethasone script.      Taking DHEA 10 mg daily.     No headaches  Does report double vision seen by optometry in 2018, noted to have intermittent diplopia on far right gaze and on far left gaze, likely secondary to decompensated esophoria     Takes prednisone 5 mg most day, will occasiaionally takes and additional 1.25 mg if not feeling well.   Florinef 0.05 M/W/F/Ascencio  0.1 rest of the week   No hypotension. Normal appetite. No dizziness or lightheadedness.      Osteoporosis    After her last bone density in 12/2022 she was noted to have increased BMD at L2 and subsequent x ray revealed a compression fracture at that vertebrae. She does not recall having any specific trauma to that area. She does get intermittent low back pain so hard to tell when fracture occurred.   Previously on fosamax since 2/2017, but  was started on Reclast after last visit and received doses in 6/2021 and 6/2022.     Review of Systems   Constitutional:  Negative for chills and fever.   Gastrointestinal:  Negative for nausea.     Objective:   Physical Exam  Vitals and nursing note reviewed.     BP Readings from Last 3 Encounters:   03/14/23 104/72   03/08/23 (!) 114/57   06/14/22 (!) 122/59     Wt Readings from Last 1 Encounters:   03/14/23 0854 61.4 kg (135 lb 4 oz)       Body mass index is 26.41 kg/m².    Lab Review:   Lab Results   Component Value Date    HGBA1C 5.1 01/21/2015     Lab Results   Component Value Date    CHOL 221 (H) 03/01/2021    HDL 62 03/01/2021    LDLCALC 137.8 03/01/2021    TRIG 106 03/01/2021    CHOLHDL 28.1 03/01/2021     Lab Results   Component Value Date     05/21/2021    K 4.5 05/21/2021     05/21/2021    CO2 26 05/21/2021    GLU 88 05/21/2021    BUN 18 05/21/2021    CREATININE 0.9 05/21/2021    CALCIUM 8.7 05/21/2021    PROT 6.6 05/21/2021    ALBUMIN 3.9 05/21/2021    BILITOT 0.5 05/21/2021    ALKPHOS 73 05/21/2021    AST 15 05/21/2021    ALT 9 (L) 05/21/2021    ANIONGAP 9 05/21/2021    ESTGFRAFRICA >60 05/21/2021    EGFRNONAA >60 05/21/2021    TSH 1.027 03/08/2023         Assessment and Plan     Panhypopituitarism  Cushing's disease s/p radiation and b/l adrenalectomy in 1972  On prednisone and florinef replacement  Was on GH in past but stopped once diagnosed with bladder cancer and IGF-1 levels have been low normal  Taking DHEAS at 10 mg daily    Adrenal insufficiency (F[place]ington's disease)  Adrenal insufficiency due to radiation to pituitary and b/l adrenalectomy   On prednisone 5 mg daily most days, occ takes 1.25mg if having more stress on a given day   Knows sick day management   Has emergency dexamethasone at home and hasn't had to use for years    Recommend to continue taking prednisone 5 mg in am and in afternoon can take additional 1/4 of a tablet (1.25 mg) if needed. Continue with current florinef  dose (goal renin >1).  Renin level pending    Osteoporosis, idiopathic  Had spontaneous age indeterminate compression fracture of L2 first noted in 12/2022    Was on Fosamax from 2017 to 6/2021, started on Reclast in 6/2021 and received 2nd infusion in 6/2022    Repeat Reclast infusion in 6/2023, we did discuss possibly starting an anabolic agent given L2 fracture but she would like to get her 3rd infusion of Reclast then see what her bone density looks like in 12/2023 before making a decision    Will repeat BMD in 12/2023      Hypothyroidism, postsurgical  -hypothyroidism post thyroidectomy   -continue levothyroxine 88 mcg daily           Follow up in 12/2023 with labs and bone density at [facility] prior appt      Jose Schilling M.D. Staff Endocrinology

## 2023-03-15 ENCOUNTER — PATIENT MESSAGE (OUTPATIENT)
Dept: ENDOCRINOLOGY | Facility: CLINIC | Age: 68
End: 2023-03-15
Payer: COMMERCIAL

## 2023-03-15 LAB — RENIN PLAS-CCNC: 17 NG/ML/H

## 2023-04-12 ENCOUNTER — PATIENT MESSAGE (OUTPATIENT)
Dept: ENDOCRINOLOGY | Facility: CLINIC | Age: 68
End: 2023-04-12
Payer: COMMERCIAL

## 2023-05-10 ENCOUNTER — TELEPHONE (OUTPATIENT)
Dept: INTERNAL MEDICINE | Facility: CLINIC | Age: 68
End: 2023-05-10
Payer: COMMERCIAL

## 2023-05-25 DIAGNOSIS — Z12.31 OTHER SCREENING MAMMOGRAM: ICD-10-CM

## 2023-06-15 ENCOUNTER — OFFICE VISIT (OUTPATIENT)
Dept: INTERNAL MEDICINE | Facility: CLINIC | Age: 68
End: 2023-06-15
Attending: INTERNAL MEDICINE
Payer: COMMERCIAL

## 2023-06-15 ENCOUNTER — TELEPHONE (OUTPATIENT)
Dept: INTERNAL MEDICINE | Facility: CLINIC | Age: 68
End: 2023-06-15
Payer: COMMERCIAL

## 2023-06-15 VITALS
SYSTOLIC BLOOD PRESSURE: 110 MMHG | WEIGHT: 133.38 LBS | BODY MASS INDEX: 26.19 KG/M2 | HEART RATE: 84 BPM | OXYGEN SATURATION: 97 % | HEIGHT: 60 IN | DIASTOLIC BLOOD PRESSURE: 75 MMHG

## 2023-06-15 DIAGNOSIS — C67.2 MALIGNANT NEOPLASM OF LATERAL WALL OF URINARY BLADDER: ICD-10-CM

## 2023-06-15 DIAGNOSIS — Z12.11 SCREENING FOR COLON CANCER: ICD-10-CM

## 2023-06-15 DIAGNOSIS — D18.00 HEMANGIOMA, UNSPECIFIED SITE: ICD-10-CM

## 2023-06-15 DIAGNOSIS — E89.0 HYPOTHYROIDISM, POSTSURGICAL: ICD-10-CM

## 2023-06-15 DIAGNOSIS — E23.0 PANHYPOPITUITARISM: ICD-10-CM

## 2023-06-15 DIAGNOSIS — H92.02 LEFT EAR PAIN: ICD-10-CM

## 2023-06-15 DIAGNOSIS — Z00.00 ANNUAL PHYSICAL EXAM: Primary | ICD-10-CM

## 2023-06-15 DIAGNOSIS — E27.40 ADRENAL INSUFFICIENCY: ICD-10-CM

## 2023-06-15 DIAGNOSIS — M41.9 SCOLIOSIS, UNSPECIFIED SCOLIOSIS TYPE, UNSPECIFIED SPINAL REGION: ICD-10-CM

## 2023-06-15 DIAGNOSIS — M81.8 OSTEOPOROSIS, IDIOPATHIC: ICD-10-CM

## 2023-06-15 PROCEDURE — 3008F BODY MASS INDEX DOCD: CPT | Mod: CPTII,S$GLB,, | Performed by: INTERNAL MEDICINE

## 2023-06-15 PROCEDURE — 99999 PR PBB SHADOW E&M-EST. PATIENT-LVL V: CPT | Mod: PBBFAC,,, | Performed by: INTERNAL MEDICINE

## 2023-06-15 PROCEDURE — 3074F PR MOST RECENT SYSTOLIC BLOOD PRESSURE < 130 MM HG: ICD-10-PCS | Mod: CPTII,S$GLB,, | Performed by: INTERNAL MEDICINE

## 2023-06-15 PROCEDURE — 99397 PR PREVENTIVE VISIT,EST,65 & OVER: ICD-10-PCS | Mod: S$GLB,,, | Performed by: INTERNAL MEDICINE

## 2023-06-15 PROCEDURE — 3008F PR BODY MASS INDEX (BMI) DOCUMENTED: ICD-10-PCS | Mod: CPTII,S$GLB,, | Performed by: INTERNAL MEDICINE

## 2023-06-15 PROCEDURE — 1159F PR MEDICATION LIST DOCUMENTED IN MEDICAL RECORD: ICD-10-PCS | Mod: CPTII,S$GLB,, | Performed by: INTERNAL MEDICINE

## 2023-06-15 PROCEDURE — 1160F PR REVIEW ALL MEDS BY PRESCRIBER/CLIN PHARMACIST DOCUMENTED: ICD-10-PCS | Mod: CPTII,S$GLB,, | Performed by: INTERNAL MEDICINE

## 2023-06-15 PROCEDURE — 1101F PR PT FALLS ASSESS DOC 0-1 FALLS W/OUT INJ PAST YR: ICD-10-PCS | Mod: CPTII,S$GLB,, | Performed by: INTERNAL MEDICINE

## 2023-06-15 PROCEDURE — 1101F PT FALLS ASSESS-DOCD LE1/YR: CPT | Mod: CPTII,S$GLB,, | Performed by: INTERNAL MEDICINE

## 2023-06-15 PROCEDURE — 1159F MED LIST DOCD IN RCRD: CPT | Mod: CPTII,S$GLB,, | Performed by: INTERNAL MEDICINE

## 2023-06-15 PROCEDURE — 3078F DIAST BP <80 MM HG: CPT | Mod: CPTII,S$GLB,, | Performed by: INTERNAL MEDICINE

## 2023-06-15 PROCEDURE — 3078F PR MOST RECENT DIASTOLIC BLOOD PRESSURE < 80 MM HG: ICD-10-PCS | Mod: CPTII,S$GLB,, | Performed by: INTERNAL MEDICINE

## 2023-06-15 PROCEDURE — 3288F FALL RISK ASSESSMENT DOCD: CPT | Mod: CPTII,S$GLB,, | Performed by: INTERNAL MEDICINE

## 2023-06-15 PROCEDURE — 99397 PER PM REEVAL EST PAT 65+ YR: CPT | Mod: S$GLB,,, | Performed by: INTERNAL MEDICINE

## 2023-06-15 PROCEDURE — 99999 PR PBB SHADOW E&M-EST. PATIENT-LVL V: ICD-10-PCS | Mod: PBBFAC,,, | Performed by: INTERNAL MEDICINE

## 2023-06-15 PROCEDURE — 3288F PR FALLS RISK ASSESSMENT DOCUMENTED: ICD-10-PCS | Mod: CPTII,S$GLB,, | Performed by: INTERNAL MEDICINE

## 2023-06-15 PROCEDURE — 1126F PR PAIN SEVERITY QUANTIFIED, NO PAIN PRESENT: ICD-10-PCS | Mod: CPTII,S$GLB,, | Performed by: INTERNAL MEDICINE

## 2023-06-15 PROCEDURE — 3074F SYST BP LT 130 MM HG: CPT | Mod: CPTII,S$GLB,, | Performed by: INTERNAL MEDICINE

## 2023-06-15 PROCEDURE — 3044F HG A1C LEVEL LT 7.0%: CPT | Mod: CPTII,S$GLB,, | Performed by: INTERNAL MEDICINE

## 2023-06-15 PROCEDURE — 3044F PR MOST RECENT HEMOGLOBIN A1C LEVEL <7.0%: ICD-10-PCS | Mod: CPTII,S$GLB,, | Performed by: INTERNAL MEDICINE

## 2023-06-15 PROCEDURE — 1126F AMNT PAIN NOTED NONE PRSNT: CPT | Mod: CPTII,S$GLB,, | Performed by: INTERNAL MEDICINE

## 2023-06-15 PROCEDURE — 1160F RVW MEDS BY RX/DR IN RCRD: CPT | Mod: CPTII,S$GLB,, | Performed by: INTERNAL MEDICINE

## 2023-06-15 RX ORDER — DEXAMETHASONE SODIUM PHOSPHATE 4 MG/ML
4 INJECTION, SOLUTION INTRA-ARTICULAR; INTRALESIONAL; INTRAMUSCULAR; INTRAVENOUS; SOFT TISSUE DAILY PRN
Qty: 1 ML | Refills: 1
Start: 2023-06-15 | End: 2024-03-27

## 2023-06-15 NOTE — PROGRESS NOTES
Subjective:   Patient ID: Tess Duncan is a 68 y.o. female  Chief complaint:   Chief Complaint   Patient presents with    Annual Exam       HPI  Pt is here for annual exam and to re-est care     Left ear pain - hurts on occ - no pain at this time   Had room spinning at times when rolls to left side - does not occur with right ear   - no fevers   - jaw locks at times   + clenching at times     Post nasal drip which causes cough at times   - taking sudafed prn         Fatigue   No snoring   Will awaken by 5 or 4 am no matter when goes to sleep   Going to sleep on average around 11am - sleeping on average 5-6 h per night    Will fall asleep in bed watching TV and awaken 1-2 hours later and turn off lights and go back to sleep    Bladder CA:   - had f/u with duncan 3/2023 and f/u annually   Prev:   - Followed by urology and undergoing BCG therapy x 3 and scope arranged for this month     Panhypopituitarism, hypothyroidism, adrenal insufficiency s/p Cushing's disease with bilateral adrenalectomy and radiation to pituitary to avoid Dakota syndrome. :   - TFTs wnl - damari current dose of levothyroxine  Followed by endo  Prev:  -Growth hormone deficiency. Last IGF1 56  Osteoporosis and scoliosis: taking alendronate - skipping dose but working to correct this   -Status post thyroidectomy for multinodular goiter and hypothyroidism and exogenous hyperthyroidism.   - TFTs wnl on levothyroxine    - has appt with endocrine Dr. Schilling 1/24/19    Osteoporosis:   - as above  - followed by endo - to repeat bmd 12/2023  - Delaying reclast after broke tooth on hard candy   - followed by closely by dentist   - has f/u later in year and considering another medication   - on occ having lower back pain   - using mm relaxer sparingly     Large hemangioma in the liver  - Checked 2018 - no change     Restless legs:   - stable    Due for:   mmg   cscope - difficulty with obtaining ride   Covid booster   Shingrix   Prevar 20    Urology:  duncan Malik: davey     Review of Systems    Objective:  Vitals:    06/15/23 0956 06/15/23 1046   BP: (!) 120/94 110/75   BP Location: Left arm    Patient Position: Sitting    Pulse: 84    SpO2: 97%    Weight: 60.5 kg (133 lb 6.1 oz)    Height: 5' (1.524 m)      Body mass index is 26.05 kg/m².    Physical Exam  Vitals reviewed.   Constitutional:       Appearance: Normal appearance. She is well-developed.   HENT:      Head: Normocephalic and atraumatic.      Right Ear: Tympanic membrane, ear canal and external ear normal.      Left Ear: Tympanic membrane, ear canal and external ear normal.      Ears:      Comments: No effusion or LAD  No crepitus or clicks at jaw     Nose: Nose normal. No congestion.      Mouth/Throat:      Mouth: Mucous membranes are moist.      Pharynx: Oropharynx is clear. No oropharyngeal exudate.   Eyes:      Extraocular Movements: Extraocular movements intact.      Conjunctiva/sclera: Conjunctivae normal.   Neck:      Thyroid: No thyromegaly.   Cardiovascular:      Rate and Rhythm: Normal rate and regular rhythm.      Pulses: Normal pulses.      Heart sounds: Normal heart sounds.   Pulmonary:      Effort: Pulmonary effort is normal.      Breath sounds: Normal breath sounds.   Abdominal:      General: Bowel sounds are normal.      Palpations: Abdomen is soft.   Musculoskeletal:         General: No swelling or tenderness.      Cervical back: Neck supple.   Lymphadenopathy:      Cervical: No cervical adenopathy.   Skin:     General: Skin is warm and dry.      Capillary Refill: Capillary refill takes less than 2 seconds.   Neurological:      General: No focal deficit present.      Mental Status: She is alert and oriented to person, place, and time. Mental status is at baseline.   Psychiatric:         Behavior: Behavior normal.         Thought Content: Thought content normal.       Assessment:  1. Annual physical exam    2. Panhypopituitarism    3. Hemangioma, unspecified site    4. Screening for  colon cancer    5. Adrenal insufficiency    6. Left ear pain    7. Hypothyroidism, postsurgical    8. Scoliosis, unspecified scoliosis type, unspecified spinal region    9. Osteoporosis, idiopathic    10. Malignant neoplasm of lateral wall of urinary bladder        Plan:  Tess was seen today for annual exam.    Diagnoses and all orders for this visit:    Annual physical exam  -     CBC Auto Differential; Future  -     Comprehensive Metabolic Panel; Future  -     Hemoglobin A1C; Future  -     Lipid Panel; Future  Recommend daily sunscreen, cardiovascular exercise min 30 min 5 days per week. Seatbelts routinely.    Panhypopituitarism  -     dexAMETHasone (DECADRON) 4 mg/mL injection; Inject 1 mL (4 mg total) into the muscle daily as needed (Signs and symtpoms of severe adrenal insufficiency).  -     Renin; Future  Stable   Followed by endo   Cont meds     Hemangioma, unspecified site  -     US Abdomen Limited; Future  Update US    Screening for colon cancer  -     Ambulatory referral/consult to Endo Procedure ; Future    Adrenal insufficiency  -     Renin; Future    Left ear pain  -     Ambulatory referral/consult to ENT; Future    Hypothyroidism, postsurgical  Stable   Cont levothyroxine    Scoliosis, unspecified scoliosis type, unspecified spinal region  Stable     Osteoporosis, idiopathic  Followed by endo   Cont ca/vit d and exercise     Malignant neoplasm of lateral wall of urinary bladder  Followed by urology   stable    Cont meds   F/u with specialists   Reviewed rec vaccines   Order for [facility]     Health Maintenance   Topic Date Due    Mammogram  12/12/2019    DEXA Scan  12/16/2024    TETANUS VACCINE  03/05/2025    Lipid Panel  06/26/2028    Hepatitis C Screening  Completed

## 2023-06-26 ENCOUNTER — HOSPITAL ENCOUNTER (OUTPATIENT)
Dept: RADIOLOGY | Facility: OTHER | Age: 68
Discharge: HOME | End: 2023-06-26
Attending: INTERNAL MEDICINE
Payer: COMMERCIAL

## 2023-06-26 DIAGNOSIS — D18.00 HEMANGIOMA, UNSPECIFIED SITE: ICD-10-CM

## 2023-06-26 DIAGNOSIS — Z12.31 OTHER SCREENING MAMMOGRAM: ICD-10-CM

## 2023-06-26 PROCEDURE — 76705 ECHO EXAM OF ABDOMEN: CPT | Mod: 26,,, | Performed by: RADIOLOGY

## 2023-06-26 PROCEDURE — 77067 SCR MAMMO BI INCL CAD: CPT | Mod: TC

## 2023-06-26 PROCEDURE — 76705 US ABDOMEN LIMITED: ICD-10-PCS | Mod: 26,,, | Performed by: RADIOLOGY

## 2023-06-26 PROCEDURE — 77067 SCR MAMMO BI INCL CAD: CPT | Mod: 26,,, | Performed by: RADIOLOGY

## 2023-06-26 PROCEDURE — 77067 MAMMO DIGITAL SCREENING BILAT WITH TOMO: ICD-10-PCS | Mod: 26,,, | Performed by: RADIOLOGY

## 2023-06-26 PROCEDURE — 77063 MAMMO DIGITAL SCREENING BILAT WITH TOMO: ICD-10-PCS | Mod: 26,,, | Performed by: RADIOLOGY

## 2023-06-26 PROCEDURE — 76705 ECHO EXAM OF ABDOMEN: CPT | Mod: TC

## 2023-06-26 PROCEDURE — 77063 BREAST TOMOSYNTHESIS BI: CPT | Mod: 26,,, | Performed by: RADIOLOGY

## 2023-07-12 ENCOUNTER — PATIENT MESSAGE (OUTPATIENT)
Dept: ENDOCRINOLOGY | Facility: CLINIC | Age: 68
End: 2023-07-12
Payer: COMMERCIAL

## 2023-07-12 DIAGNOSIS — M81.8 OSTEOPOROSIS, IDIOPATHIC: Primary | ICD-10-CM

## 2023-07-12 RX ORDER — ACETAMINOPHEN 500 MG
500 TABLET ORAL
Status: CANCELLED | OUTPATIENT
Start: 2023-07-12

## 2023-07-12 RX ORDER — SODIUM CHLORIDE 0.9 % (FLUSH) 0.9 %
10 SYRINGE (ML) INJECTION
Status: CANCELLED | OUTPATIENT
Start: 2023-07-12

## 2023-07-12 RX ORDER — ZOLEDRONIC ACID 5 MG/100ML
5 INJECTION, SOLUTION INTRAVENOUS
Status: CANCELLED | OUTPATIENT
Start: 2023-07-12

## 2023-07-12 RX ORDER — HEPARIN 100 UNIT/ML
500 SYRINGE INTRAVENOUS
Status: CANCELLED | OUTPATIENT
Start: 2023-07-12

## 2023-07-20 ENCOUNTER — TELEPHONE (OUTPATIENT)
Dept: ENDOCRINOLOGY | Facility: CLINIC | Age: 68
End: 2023-07-20
Payer: COMMERCIAL

## 2023-07-20 NOTE — TELEPHONE ENCOUNTER
----- Message from Nilam Alfaro MA sent at 7/19/2023  4:44 PM CDT -----  Contact: [phone]    ----- Message -----  From: Nilam Alfaro MA  Sent: 7/19/2023   9:00 AM CDT  To: Nilam Alfaro MA      ----- Message -----  From: Nilam Alfaro MA  Sent: 7/17/2023   9:00 AM CDT  To: Nilam Alfaro MA      ----- Message -----  From: Nilam Alfaro MA  Sent: 7/13/2023   9:00 AM CDT  To: Nilam Alfaro MA      ----- Message -----  From: Erin Torres  Sent: 7/12/2023   1:35 PM CDT  To: Shakir Garcia Staff    the patient is calling to get scheduled for a appt.  Pt access tried but no appts are available.  the patient can be reached at.   [phone]

## 2023-07-20 NOTE — TELEPHONE ENCOUNTER
Patient was trying to book an appointment in December for a 1 yr f/u I told patient the books not open yet so please call back on August 1st

## 2023-08-18 ENCOUNTER — PATIENT MESSAGE (OUTPATIENT)
Dept: INFECTIOUS DISEASES | Facility: HOSPITAL | Age: 68
End: 2023-08-18
Payer: COMMERCIAL

## 2023-09-01 ENCOUNTER — PATIENT MESSAGE (OUTPATIENT)
Dept: ENDOCRINOLOGY | Facility: CLINIC | Age: 68
End: 2023-09-01
Payer: COMMERCIAL

## 2023-09-05 ENCOUNTER — PATIENT MESSAGE (OUTPATIENT)
Dept: ENDOCRINOLOGY | Facility: CLINIC | Age: 68
End: 2023-09-05
Payer: COMMERCIAL

## 2023-10-04 ENCOUNTER — TELEPHONE (OUTPATIENT)
Dept: INTERNAL MEDICINE | Facility: CLINIC | Age: 68
End: 2023-10-04
Payer: COMMERCIAL

## 2023-10-04 DIAGNOSIS — E23.0 PANHYPOPITUITARISM: ICD-10-CM

## 2023-10-04 DIAGNOSIS — Z29.89 INDICATION PRESENT FOR ENDOCARDITIS PROPHYLAXIS: Primary | ICD-10-CM

## 2023-10-04 RX ORDER — CEPHALEXIN 500 MG/1
CAPSULE ORAL
Qty: 4 CAPSULE | Refills: 0 | Status: SHIPPED | OUTPATIENT
Start: 2023-10-04 | End: 2024-03-27

## 2023-10-04 NOTE — TELEPHONE ENCOUNTER
Called and spoke directly to pt   She damari keflex iv does last year given at time of cystoscopy    Will give ppx dose   Form completed - referenced endo message with rec on steroids prior to and after surgery and included on form     - please fax form to her oral surgery specialist   - please let her know that fax was sent so she can follow up as well with surgeon's office

## 2023-12-18 ENCOUNTER — HOSPITAL ENCOUNTER (OUTPATIENT)
Dept: RADIOLOGY | Facility: CLINIC | Age: 68
Discharge: HOME | End: 2023-12-18
Attending: INTERNAL MEDICINE
Payer: COMMERCIAL

## 2023-12-18 DIAGNOSIS — M81.8 OSTEOPOROSIS, IDIOPATHIC: ICD-10-CM

## 2023-12-18 PROCEDURE — 77080 DXA BONE DENSITY AXIAL: CPT | Mod: 26,,, | Performed by: INTERNAL MEDICINE

## 2023-12-18 PROCEDURE — 77080 DXA BONE DENSITY AXIAL: CPT | Mod: TC

## 2023-12-18 PROCEDURE — 77080 DXA BONE DENSITY AXIAL SKELETON 1 OR MORE SITES: ICD-10-PCS | Mod: 26,,, | Performed by: INTERNAL MEDICINE

## 2023-12-19 ENCOUNTER — PATIENT MESSAGE (OUTPATIENT)
Dept: ENDOCRINOLOGY | Facility: CLINIC | Age: 68
End: 2023-12-19
Payer: COMMERCIAL

## 2023-12-19 DIAGNOSIS — E23.0 PANHYPOPITUITARISM: ICD-10-CM

## 2023-12-19 DIAGNOSIS — E27.1 ADRENAL INSUFFICIENCY (ADDISON'S DISEASE): Primary | ICD-10-CM

## 2024-01-04 ENCOUNTER — LAB VISIT (OUTPATIENT)
Dept: LAB | Facility: OTHER | Age: 69
End: 2024-01-04
Attending: INTERNAL MEDICINE
Payer: COMMERCIAL

## 2024-01-04 DIAGNOSIS — E27.1 ADRENAL INSUFFICIENCY (ADDISON'S DISEASE): ICD-10-CM

## 2024-01-04 DIAGNOSIS — E23.0 PANHYPOPITUITARISM: ICD-10-CM

## 2024-01-04 LAB
DHEA-S SERPL-MCNC: 151.4 UG/DL (ref 33.6–78.9)
T4 FREE SERPL-MCNC: 1.45 NG/DL (ref 0.71–1.51)
TSH SERPL DL<=0.005 MIU/L-ACNC: 1.16 UIU/ML (ref 0.4–4)

## 2024-01-04 PROCEDURE — 84244 ASSAY OF RENIN: CPT | Performed by: INTERNAL MEDICINE

## 2024-01-04 PROCEDURE — 82627 DEHYDROEPIANDROSTERONE: CPT | Performed by: INTERNAL MEDICINE

## 2024-01-04 PROCEDURE — 84443 ASSAY THYROID STIM HORMONE: CPT | Performed by: INTERNAL MEDICINE

## 2024-01-04 PROCEDURE — 84305 ASSAY OF SOMATOMEDIN: CPT | Performed by: INTERNAL MEDICINE

## 2024-01-04 PROCEDURE — 84439 ASSAY OF FREE THYROXINE: CPT | Performed by: INTERNAL MEDICINE

## 2024-01-08 LAB — RENIN PLAS-CCNC: 5.6 NG/ML/H

## 2024-01-09 LAB
IGF-I SERPL-MCNC: 83 NG/ML (ref 34–194)
IGF-I Z-SCORE SERPL: -0.25 SD

## 2024-01-10 ENCOUNTER — PATIENT MESSAGE (OUTPATIENT)
Dept: ENDOCRINOLOGY | Facility: CLINIC | Age: 69
End: 2024-01-10
Payer: COMMERCIAL

## 2024-01-10 ENCOUNTER — OFFICE VISIT (OUTPATIENT)
Dept: ENDOCRINOLOGY | Facility: CLINIC | Age: 69
End: 2024-01-10
Payer: COMMERCIAL

## 2024-01-10 VITALS
HEIGHT: 60 IN | BODY MASS INDEX: 26.11 KG/M2 | DIASTOLIC BLOOD PRESSURE: 68 MMHG | SYSTOLIC BLOOD PRESSURE: 108 MMHG | WEIGHT: 133 LBS

## 2024-01-10 DIAGNOSIS — E27.1 ADRENAL INSUFFICIENCY (ADDISON'S DISEASE): ICD-10-CM

## 2024-01-10 DIAGNOSIS — I67.1 BRAIN ANEURYSM: ICD-10-CM

## 2024-01-10 DIAGNOSIS — M81.8 OSTEOPOROSIS, IDIOPATHIC: ICD-10-CM

## 2024-01-10 DIAGNOSIS — E23.0 PANHYPOPITUITARISM: Primary | ICD-10-CM

## 2024-01-10 DIAGNOSIS — E89.0 HYPOTHYROIDISM, POSTSURGICAL: ICD-10-CM

## 2024-01-10 PROCEDURE — 99214 OFFICE O/P EST MOD 30 MIN: CPT | Mod: S$GLB,,, | Performed by: INTERNAL MEDICINE

## 2024-01-10 PROCEDURE — 1159F MED LIST DOCD IN RCRD: CPT | Mod: CPTII,S$GLB,, | Performed by: INTERNAL MEDICINE

## 2024-01-10 PROCEDURE — 1160F RVW MEDS BY RX/DR IN RCRD: CPT | Mod: CPTII,S$GLB,, | Performed by: INTERNAL MEDICINE

## 2024-01-10 PROCEDURE — 3074F SYST BP LT 130 MM HG: CPT | Mod: CPTII,S$GLB,, | Performed by: INTERNAL MEDICINE

## 2024-01-10 PROCEDURE — 1101F PT FALLS ASSESS-DOCD LE1/YR: CPT | Mod: CPTII,S$GLB,, | Performed by: INTERNAL MEDICINE

## 2024-01-10 PROCEDURE — 3078F DIAST BP <80 MM HG: CPT | Mod: CPTII,S$GLB,, | Performed by: INTERNAL MEDICINE

## 2024-01-10 PROCEDURE — 3288F FALL RISK ASSESSMENT DOCD: CPT | Mod: CPTII,S$GLB,, | Performed by: INTERNAL MEDICINE

## 2024-01-10 PROCEDURE — 3008F BODY MASS INDEX DOCD: CPT | Mod: CPTII,S$GLB,, | Performed by: INTERNAL MEDICINE

## 2024-01-10 PROCEDURE — 1126F AMNT PAIN NOTED NONE PRSNT: CPT | Mod: CPTII,S$GLB,, | Performed by: INTERNAL MEDICINE

## 2024-01-10 PROCEDURE — 99999 PR PBB SHADOW E&M-EST. PATIENT-LVL IV: CPT | Mod: PBBFAC,,, | Performed by: INTERNAL MEDICINE

## 2024-01-10 RX ORDER — PREDNISONE 50 MG/1
TABLET ORAL
Qty: 3 TABLET | Refills: 0 | Status: SHIPPED | OUTPATIENT
Start: 2024-01-10

## 2024-01-10 NOTE — PROGRESS NOTES
Subjective:      Patient ID: Tess Duncan is a 68 y.o. female.    Chief Complaint:  Other Misc (Panhypopituitarism)      History of Present Illness  Ms. Duncan presents for follow up of panhypopituitarism and osteoporosis. Last visit 3/2023.    No significant medical changes since last visit.       The patient has the following problems:   Cushing's disease 1972, bilateral adrenalectomy that year and pituitary radiation to prevent Dakota's later that year.      # Status post Cushing's disease with bilateral adrenalectomy.   # Radiation to the pituitary to avoid Dakota syndrome.    # Growth hormone deficiency. Off of GH now. GH was discontinued when IGF1 levels were normal. IGF-1 levels now fluctuate between normal and slightly low. Hx of bladder cancer and per pt been monitor for now.  # Osteoporosis and scoliosis.   # Status post thyroidectomy in 1/2009 with benign goiter, was started on thyroid hormone at that time  Father with thyroid cancer   In regards to the Cushing's and bilateral adrenalectomy, she is currently taking prednisone 5-6.25 mg per day mg per day (rarely takes an additional quarter tablet if not feeling well). Understands sick day precautions. Has emergency dexamethasone script.      Taking DHEA 10 mg daily.     No headaches  Does report double vision seen by optometry in 2018, noted to have intermittent diplopia on far right gaze and on far left gaze, likely secondary to decompensated esophoria     Takes prednisone 5 mg most day, will occasiaionally takes and additional 1.25 mg if not feeling well (rare)  Florinef 100 mcg 4 days per week and 50 mcg 3 days per week  No hypotension. Normal appetite. No dizziness or lightheadedness.      Osteoporosis    After her last bone density in 12/2022 she was noted to have increased BMD at L2 and subsequent x ray revealed a compression fracture at that vertebrae. She does not recall having any specific trauma to that area. She does get intermittent low  back pain so hard to tell when fracture occurred.   Previously on fosamax since 2/2017, but was started on Reclast and received doses in 6/2021 and 6/2022. We held her last dose of Reclast in 6/2023 as she was considering having a dental surgery involving radha but she has decided to forgo this and would like to get back on Reclast now. Her last bone density in 12/2023 showed stability at the spine and hip.       Review of Systems   Constitutional:  Negative for chills and fever.   Gastrointestinal:  Negative for nausea.       Objective:   Physical Exam  Vitals and nursing note reviewed.       BP Readings from Last 3 Encounters:   01/10/24 108/68   06/15/23 110/75   03/14/23 104/72     Wt Readings from Last 1 Encounters:   01/10/24 0903 60.3 kg (133 lb)       Body mass index is 25.97 kg/m².    Lab Review:   Lab Results   Component Value Date    HGBA1C 5.1 06/26/2023     Lab Results   Component Value Date    CHOL 231 (H) 06/26/2023    HDL 69 06/26/2023    LDLCALC 139.2 06/26/2023    TRIG 114 06/26/2023    CHOLHDL 29.9 06/26/2023     Lab Results   Component Value Date     06/26/2023    K 4.5 06/26/2023     06/26/2023    CO2 26 06/26/2023    GLU 80 06/26/2023    BUN 23 06/26/2023    CREATININE 0.9 06/26/2023    CALCIUM 9.2 06/26/2023    PROT 6.7 06/26/2023    ALBUMIN 4.2 06/26/2023    BILITOT 0.4 06/26/2023    ALKPHOS 54 (L) 06/26/2023    AST 27 06/26/2023    ALT 23 06/26/2023    ANIONGAP 7 (L) 06/26/2023    ESTGFRAFRICA >60 05/21/2021    EGFRNONAA >60 05/21/2021    TSH 1.164 01/04/2024         Assessment and Plan     Panhypopituitarism  Cushing's disease s/p radiation and b/l adrenalectomy in 1972  On prednisone and florinef replacement  Was on GH in past but stopped once diagnosed with bladder cancer and IGF-1 levels have been low normal  Taking DHEAS at 10 mg daily    Adrenal insufficiency (Kranthi's disease)  Adrenal insufficiency due to radiation to pituitary and b/l adrenalectomy   On prednisone 5 mg  daily most days, occ takes 1.25mg if having more stress on a given day   Knows sick day management   Has emergency dexamethasone at home and hasn't had to use for years    Recommend to continue taking prednisone 5 mg in am and in afternoon can take additional 1/4 of a tablet (1.25 mg) if needed. Continue with current florinef dose (goal renin >1). Current florinef dose 100 mcg 4 days per week and 50 mcg 3 days per week.      Osteoporosis, idiopathic  Had spontaneous age indeterminate compression fracture of L2 first noted in 12/2022    Was on Fosamax from 2017 to 6/2021, started on Reclast in 6/2021 and received 2nd infusion in 6/2022    We did discuss possibly starting an anabolic agent given L2 fracture but she would like to get her 3rd infusion of Reclast then see what her bone density looks like in 12/2023 before making a decision    Give 3rd dose of Reclast now    Repeat BMD in 12/2025      Hypothyroidism, postsurgical  -hypothyroidism post thyroidectomy   -continue levothyroxine 88 mcg daily         Brain aneurysm  --Will update MRI now      MRI when able, follow in 6-8 months with labs prior       Jose Schilling M.D. Staff Endocrinology

## 2024-01-10 NOTE — ASSESSMENT & PLAN NOTE
Had spontaneous age indeterminate compression fracture of L2 first noted in 12/2022    Was on Fosamax from 2017 to 6/2021, started on Reclast in 6/2021 and received 2nd infusion in 6/2022    We did discuss possibly starting an anabolic agent given L2 fracture but she would like to get her 3rd infusion of Reclast then see what her bone density looks like in 12/2023 before making a decision    Give 3rd dose of Reclast now    Repeat BMD in 12/2025

## 2024-01-10 NOTE — ASSESSMENT & PLAN NOTE
Adrenal insufficiency due to radiation to pituitary and b/l adrenalectomy   On prednisone 5 mg daily most days, occ takes 1.25mg if having more stress on a given day   Knows sick day management   Has emergency dexamethasone at home and hasn't had to use for years    Recommend to continue taking prednisone 5 mg in am and in afternoon can take additional 1/4 of a tablet (1.25 mg) if needed. Continue with current florinef dose (goal renin >1). Current florinef dose 100 mcg 4 days per week and 50 mcg 3 days per week.

## 2024-01-11 ENCOUNTER — TELEPHONE (OUTPATIENT)
Dept: INFECTIOUS DISEASES | Facility: HOSPITAL | Age: 69
End: 2024-01-11
Payer: COMMERCIAL

## 2024-01-11 PROBLEM — I67.1 BRAIN ANEURYSM: Status: ACTIVE | Noted: 2024-01-11

## 2024-01-11 RX ORDER — ACAR/CYST/ALA/Q10/P.SER/BROCC 800-300 MG
10 POWDER IN PACKET (EA) ORAL DAILY
Qty: 30 TABLET | Refills: 0 | COMMUNITY
Start: 2024-01-11

## 2024-01-11 NOTE — TELEPHONE ENCOUNTER
Tried to reach pt by phone to schedule her RECLAST infusionbut did not get a reply. Tried to leave message but the voicemail box has not been set up.

## 2024-01-13 ENCOUNTER — HOSPITAL ENCOUNTER (OUTPATIENT)
Dept: RADIOLOGY | Facility: HOSPITAL | Age: 69
Discharge: HOME | End: 2024-01-13
Attending: INTERNAL MEDICINE
Payer: COMMERCIAL

## 2024-01-13 DIAGNOSIS — I67.1 BRAIN ANEURYSM: ICD-10-CM

## 2024-01-13 PROCEDURE — 70553 MRI BRAIN STEM W/O & W/DYE: CPT | Mod: TC

## 2024-01-13 PROCEDURE — 70553 MRI BRAIN STEM W/O & W/DYE: CPT | Mod: 26,,, | Performed by: RADIOLOGY

## 2024-01-13 PROCEDURE — A9585 GADOBUTROL INJECTION: HCPCS | Performed by: INTERNAL MEDICINE

## 2024-01-13 PROCEDURE — 25500020 PHARM REV CODE 255: Performed by: INTERNAL MEDICINE

## 2024-01-13 RX ORDER — GADOBUTROL 604.72 MG/ML
6 INJECTION INTRAVENOUS
Status: COMPLETED | OUTPATIENT
Start: 2024-01-13 | End: 2024-01-13

## 2024-01-13 RX ADMIN — GADOBUTROL 6 ML: 604.72 INJECTION INTRAVENOUS at 10:01

## 2024-01-14 LAB
CREAT SERPL-MCNC: 0.8 MG/DL (ref 0.5–1.4)
SAMPLE: NORMAL

## 2024-02-02 ENCOUNTER — PATIENT MESSAGE (OUTPATIENT)
Dept: INTERNAL MEDICINE | Facility: CLINIC | Age: 69
End: 2024-02-02
Payer: COMMERCIAL

## 2024-02-14 ENCOUNTER — OFFICE VISIT (OUTPATIENT)
Dept: INTERNAL MEDICINE | Facility: CLINIC | Age: 69
End: 2024-02-14
Payer: COMMERCIAL

## 2024-02-14 ENCOUNTER — HOSPITAL ENCOUNTER (OUTPATIENT)
Dept: CARDIOLOGY | Facility: OTHER | Age: 69
Discharge: HOME | End: 2024-02-14
Payer: COMMERCIAL

## 2024-02-14 VITALS
WEIGHT: 134.25 LBS | BODY MASS INDEX: 26.35 KG/M2 | HEIGHT: 60 IN | DIASTOLIC BLOOD PRESSURE: 68 MMHG | OXYGEN SATURATION: 95 % | HEART RATE: 98 BPM | SYSTOLIC BLOOD PRESSURE: 129 MMHG

## 2024-02-14 DIAGNOSIS — Z01.818 PREOP EXAMINATION: Primary | ICD-10-CM

## 2024-02-14 DIAGNOSIS — Z01.818 PREOP EXAMINATION: ICD-10-CM

## 2024-02-14 PROCEDURE — 93005 ELECTROCARDIOGRAM TRACING: CPT

## 2024-02-14 PROCEDURE — 3078F DIAST BP <80 MM HG: CPT | Mod: CPTII,S$GLB,,

## 2024-02-14 PROCEDURE — 93010 ELECTROCARDIOGRAM REPORT: CPT | Mod: ,,, | Performed by: INTERNAL MEDICINE

## 2024-02-14 PROCEDURE — 99213 OFFICE O/P EST LOW 20 MIN: CPT | Mod: S$GLB,,,

## 2024-02-14 PROCEDURE — 1159F MED LIST DOCD IN RCRD: CPT | Mod: CPTII,S$GLB,,

## 2024-02-14 PROCEDURE — 1101F PT FALLS ASSESS-DOCD LE1/YR: CPT | Mod: CPTII,S$GLB,,

## 2024-02-14 PROCEDURE — 3008F BODY MASS INDEX DOCD: CPT | Mod: CPTII,S$GLB,,

## 2024-02-14 PROCEDURE — 3288F FALL RISK ASSESSMENT DOCD: CPT | Mod: CPTII,S$GLB,,

## 2024-02-14 PROCEDURE — 1126F AMNT PAIN NOTED NONE PRSNT: CPT | Mod: CPTII,S$GLB,,

## 2024-02-14 PROCEDURE — 3074F SYST BP LT 130 MM HG: CPT | Mod: CPTII,S$GLB,,

## 2024-02-14 PROCEDURE — 99999 PR PBB SHADOW E&M-EST. PATIENT-LVL IV: CPT | Mod: PBBFAC,,,

## 2024-02-14 NOTE — PROGRESS NOTES
"HPI     Tess Duncan is a 69 y.o. female who presents for preop exam today.    PCP is Julia Crawley MD, patient is new to me. Pt denies acute complaints    HTN: N  DM: N  CHF: N  MI: N  CVA: N  TIA: N  Trouble with anesthesia in the past? Can come out early  Family history of sudden cardiac death? N  ADLs: Self  Anticoagulation: N  SANDRA: N  Cigarettes: N  Opioids: N  Dental implants/hardware that is removable: N      Past Medical History:  Past Medical History:   Diagnosis Date    Adrenal insufficiency (Wasatch's disease)     Arthritis     Cancer     Cataract     Hemangioma of liver     Hx of colonic polyps 07/30/2009    Hyperlipidemia     Osteopenia     Panhypopituitarism     Recurrent UTI     Renal cyst     Scoliosis        Home Medications:  Prior to Admission medications    Medication Sig Start Date End Date Taking? Authorizing Provider   b complex vitamins tablet Take 1 tablet by mouth once daily.   Yes Provider, Historical   BD LUER-PHILL SYRINGE 3 mL 25 x 1 1/2 " Syrg USE D UTD 5/13/20  Yes Provider, Historical   calcium carbonate 390 mg calcium (1,000 mg) Tab Take by mouth.   Yes Provider, Historical   cephALEXin (KEFLEX) 500 MG capsule Take 2000mg by mouth x 1 dose 30-60min prior to procedure 10/4/23  Yes Julia Crawley MD   cyanocobalamin 500 MCG tablet Take 500 mcg by mouth once daily.   Yes Provider, Historical   dexAMETHasone (DECADRON) 4 mg/mL injection Inject 1 mL (4 mg total) into the muscle daily as needed (Signs and symtpoms of severe adrenal insufficiency). 6/15/23  Yes Julia Crawley MD   fludrocortisone (FLORINEF) 0.1 mg Tab Take 1 tablet (100 mcg total) by mouth once daily. 3/14/23  Yes Joes Schilling MD   levothyroxine (SYNTHROID) 88 MCG tablet Take 1 tablet (88 mcg total) by mouth before breakfast. 3/14/23  Yes Jose Schilling MD   prasterone, dhea, 10 mg Tab Take 10 mg by mouth once daily. 1/11/24  Yes Jose Schilling MD   predniSONE (DELTASONE) 5 MG tablet TAKE 1 " TABLET BY MOUTH EVERY MORNING AND 1/2 TABLET BY MOUTH EVERY EVENING 3/14/23  Yes Jose Schilling MD   predniSONE (DELTASONE) 50 MG Tab Take 1 tablet by mouth 13 hrs before, 7 hrs before and 1 hr before scheduled MRI 1/10/24  Yes Jose Schilling MD   UNABLE TO FIND Take by mouth once daily. TUMERIC   Yes Provider, Historical   vitamin D 1000 units Tab Take 10,000 Units by mouth once daily.   Yes Provider, Historical       Review of Systems:  Review of Systems   Constitutional:  Negative for chills, fever and unexpected weight change.   HENT:  Negative for congestion and sore throat.    Eyes:  Negative for visual disturbance.   Respiratory:  Negative for cough, shortness of breath and wheezing.    Cardiovascular:  Negative for chest pain and palpitations.   Gastrointestinal:  Negative for abdominal pain, constipation and diarrhea.   Endocrine: Negative for polydipsia and polyuria.   Genitourinary:  Negative for difficulty urinating and menstrual problem.   Musculoskeletal: Negative.    Skin: Negative.    Neurological:  Negative for facial asymmetry, speech difficulty and weakness.   Hematological:  Does not bruise/bleed easily.   Psychiatric/Behavioral:  Negative for sleep disturbance and suicidal ideas. The patient is not nervous/anxious.        Health Maintainence:   Immunizations:  Health Maintenance         Date Due Completion Date    Shingles Vaccine (1 of 2) Never done ---    Pneumococcal Vaccines (Age 65+) (2 of 2 - PCV) 02/02/2005 2/2/2004    Colorectal Cancer Screening 07/30/2014 7/30/2009    RSV Vaccine (Age 60+ and Pregnant patients) (1 - 1-dose 60+ series) Never done ---    Influenza Vaccine (1) 09/01/2023 12/11/2021    COVID-19 Vaccine (4 - 2023-24 season) 09/01/2023 12/4/2021    Mammogram 06/26/2024 6/26/2023    TETANUS VACCINE 03/05/2025 3/5/2015 (Done)    Override on 3/5/2015: Done    Override on 9/19/2005: Done    DEXA Scan 12/18/2025 12/18/2023    Hemoglobin A1c (Diabetic Prevention Screening)  06/26/2026 6/26/2023    Lipid Panel 06/26/2028 6/26/2023    Override on 10/17/2017: Done             PHYSICAL EXAM     /68 (BP Location: Left arm, Patient Position: Sitting, BP Method: Medium (Automatic))   Pulse 98   Ht 5' (1.524 m)   Wt 60.9 kg (134 lb 4.2 oz)   SpO2 95%   BMI 26.22 kg/m²     Physical Exam  Vitals reviewed.   Constitutional:       General: She is not in acute distress.     Appearance: She is well-developed. She is not diaphoretic.   HENT:      Head: Normocephalic and atraumatic.      Nose: Nose normal.   Eyes:      General:         Right eye: No discharge.         Left eye: No discharge.      Conjunctiva/sclera: Conjunctivae normal.      Pupils: Pupils are equal, round, and reactive to light.   Neck:      Thyroid: No thyromegaly.   Cardiovascular:      Rate and Rhythm: Normal rate and regular rhythm.      Heart sounds: No murmur heard.  Pulmonary:      Effort: Pulmonary effort is normal. No respiratory distress.      Breath sounds: Normal breath sounds.   Abdominal:      General: Abdomen is flat. There is no distension.      Palpations: Abdomen is soft.   Musculoskeletal:      Cervical back: Neck supple.   Skin:     General: Skin is warm and dry.   Neurological:      Mental Status: She is alert and oriented to person, place, and time.   Psychiatric:         Behavior: Behavior normal.         LABS     Lab Results   Component Value Date    HGBA1C 5.1 06/26/2023     CMP  Sodium   Date Value Ref Range Status   06/26/2023 141 136 - 145 mmol/L Final     Potassium   Date Value Ref Range Status   06/26/2023 4.5 3.5 - 5.1 mmol/L Final     Chloride   Date Value Ref Range Status   06/26/2023 108 95 - 110 mmol/L Final     CO2   Date Value Ref Range Status   06/26/2023 26 23 - 29 mmol/L Final     Glucose   Date Value Ref Range Status   06/26/2023 80 70 - 110 mg/dL Final     BUN   Date Value Ref Range Status   06/26/2023 23 8 - 23 mg/dL Final     Creatinine   Date Value Ref Range Status   06/26/2023  0.9 0.5 - 1.4 mg/dL Final     Calcium   Date Value Ref Range Status   06/26/2023 9.2 8.7 - 10.5 mg/dL Final     Total Protein   Date Value Ref Range Status   06/26/2023 6.7 6.0 - 8.4 g/dL Final     Albumin   Date Value Ref Range Status   06/26/2023 4.2 3.5 - 5.2 g/dL Final     Total Bilirubin   Date Value Ref Range Status   06/26/2023 0.4 0.1 - 1.0 mg/dL Final     Comment:     For infants and newborns, interpretation of results should be based  on gestational age, weight and in agreement with clinical  observations.    Premature Infant recommended reference ranges:  Up to 24 hours.............<8.0 mg/dL  Up to 48 hours............<12.0 mg/dL  3-5 days..................<15.0 mg/dL  6-29 days.................<15.0 mg/dL       Alkaline Phosphatase   Date Value Ref Range Status   06/26/2023 54 (L) 55 - 135 U/L Final     AST   Date Value Ref Range Status   06/26/2023 27 10 - 40 U/L Final     ALT   Date Value Ref Range Status   06/26/2023 23 10 - 44 U/L Final     Anion Gap   Date Value Ref Range Status   06/26/2023 7 (L) 8 - 16 mmol/L Final     eGFR if    Date Value Ref Range Status   05/21/2021 >60 >60 mL/min/1.73 m^2 Final     eGFR if non    Date Value Ref Range Status   05/21/2021 >60 >60 mL/min/1.73 m^2 Final     Comment:     Calculation used to obtain the estimated glomerular filtration  rate (eGFR) is the CKD-EPI equation.        Lab Results   Component Value Date    WBC 4.80 06/26/2023    HGB 14.3 06/26/2023    HCT 43.2 06/26/2023    MCV 93 06/26/2023     06/26/2023     Lab Results   Component Value Date    CHOL 231 (H) 06/26/2023    CHOL 221 (H) 03/01/2021    CHOL 227 (H) 11/19/2018     Lab Results   Component Value Date    HDL 69 06/26/2023    HDL 62 03/01/2021    HDL 58 11/19/2018     Lab Results   Component Value Date    LDLCALC 139.2 06/26/2023    LDLCALC 137.8 03/01/2021    LDLCALC 139.4 11/19/2018     Lab Results   Component Value Date    TRIG 114 06/26/2023    TRIG 106  03/01/2021    TRIG 148 11/19/2018     Lab Results   Component Value Date    CHOLHDL 29.9 06/26/2023    CHOLHDL 28.1 03/01/2021    CHOLHDL 25.6 11/19/2018     Lab Results   Component Value Date    TSH 1.164 01/04/2024       ASSESSMENT/PLAN   1. Preop examination  -     SCHEDULED EKG 12-LEAD (to Muse); Future           Bora Gaytan NP   Department of Internal Medicine - [facility]  11:13 AM

## 2024-02-15 ENCOUNTER — PATIENT MESSAGE (OUTPATIENT)
Dept: INTERNAL MEDICINE | Facility: CLINIC | Age: 69
End: 2024-02-15
Payer: COMMERCIAL

## 2024-02-15 LAB
OHS QRS DURATION: 82 MS
OHS QTC CALCULATION: 428 MS

## 2024-02-15 NOTE — TELEPHONE ENCOUNTER
Reviewed EKG and showed normal rhythm and possible left atrial enlargement   - this is a possible finding indicating that it may not be present   - this can be caused by high blood pressure     Her blood pressure has been typically in good range on chart review  For now, it is recommended to follow a low salt diet, maintain healthy weight, and exercise regularly to maintain good blood pressure control.

## 2024-02-26 ENCOUNTER — PATIENT MESSAGE (OUTPATIENT)
Dept: INTERNAL MEDICINE | Facility: CLINIC | Age: 69
End: 2024-02-26
Payer: COMMERCIAL

## 2024-02-27 ENCOUNTER — OFFICE VISIT (OUTPATIENT)
Dept: DERMATOLOGY | Facility: CLINIC | Age: 69
End: 2024-02-27
Payer: COMMERCIAL

## 2024-02-27 DIAGNOSIS — L29.9 PRURITUS: ICD-10-CM

## 2024-02-27 DIAGNOSIS — L82.1 SEBORRHEIC KERATOSES: ICD-10-CM

## 2024-02-27 DIAGNOSIS — L81.4 LENTIGO: Primary | ICD-10-CM

## 2024-02-27 DIAGNOSIS — L73.9 FOLLICULITIS: ICD-10-CM

## 2024-02-27 DIAGNOSIS — D22.9 MULTIPLE BENIGN NEVI: ICD-10-CM

## 2024-02-27 DIAGNOSIS — Z12.83 SCREENING EXAM FOR SKIN CANCER: ICD-10-CM

## 2024-02-27 DIAGNOSIS — D18.01 CHERRY ANGIOMA: ICD-10-CM

## 2024-02-27 PROCEDURE — 99999 PR PBB SHADOW E&M-EST. PATIENT-LVL III: CPT | Mod: PBBFAC,,, | Performed by: STUDENT IN AN ORGANIZED HEALTH CARE EDUCATION/TRAINING PROGRAM

## 2024-02-27 PROCEDURE — 3288F FALL RISK ASSESSMENT DOCD: CPT | Mod: CPTII,S$GLB,, | Performed by: STUDENT IN AN ORGANIZED HEALTH CARE EDUCATION/TRAINING PROGRAM

## 2024-02-27 PROCEDURE — 1101F PT FALLS ASSESS-DOCD LE1/YR: CPT | Mod: CPTII,S$GLB,, | Performed by: STUDENT IN AN ORGANIZED HEALTH CARE EDUCATION/TRAINING PROGRAM

## 2024-02-27 PROCEDURE — 99204 OFFICE O/P NEW MOD 45 MIN: CPT | Mod: S$GLB,,, | Performed by: STUDENT IN AN ORGANIZED HEALTH CARE EDUCATION/TRAINING PROGRAM

## 2024-02-27 PROCEDURE — 1160F RVW MEDS BY RX/DR IN RCRD: CPT | Mod: CPTII,S$GLB,, | Performed by: STUDENT IN AN ORGANIZED HEALTH CARE EDUCATION/TRAINING PROGRAM

## 2024-02-27 PROCEDURE — 1159F MED LIST DOCD IN RCRD: CPT | Mod: CPTII,S$GLB,, | Performed by: STUDENT IN AN ORGANIZED HEALTH CARE EDUCATION/TRAINING PROGRAM

## 2024-02-27 PROCEDURE — 1126F AMNT PAIN NOTED NONE PRSNT: CPT | Mod: CPTII,S$GLB,, | Performed by: STUDENT IN AN ORGANIZED HEALTH CARE EDUCATION/TRAINING PROGRAM

## 2024-02-27 RX ORDER — CLOBETASOL PROPIONATE 0.46 MG/ML
SOLUTION TOPICAL DAILY
Qty: 50 ML | Refills: 2 | Status: SHIPPED | OUTPATIENT
Start: 2024-02-27

## 2024-02-27 NOTE — PATIENT INSTRUCTIONS
CLn shampoo    Dermeleve scalp solution    ________________________________      Sunscreen Guidelines  Sunscreen protects your skin by absorbing and reflecting ultraviolet rays. All sunscreens have a sun protection factor (SPF) rating that indicates how long a sunscreen will remain effective on the skin.    Why protect your skin?  The sun's rays are composed of many different wavelengths, including UVA, UVB, and visible light that each affect the skin differently.    UVB: sunburn, photoaging, skin cancer (melanoma, basal cell, and squamous cell carcinomas) and modulation of the skin's immune system.    UVA: similar to above but thought to contribute more to aging; at the same dose of UVB it is less powerful however the sun has 10-20 times the levels of UVA as compared with UVB.  Visible light: implicated in causing unwanted darkening of skin, such as melasma and post-inflammatory hyperpigmentation in darker skin types     If I have dark skin, do I need to worry about the sun?    More darkly pigmented skin is more protected against UV-induced skin cancer, sunburn, and photoaging, though may still suffer from sun-related conditions, including melasma, hyperpigmentation, and other dark spots.    Sun avoidance  As a general rule, stay out of the sun as much as possible between 10 a.m. - 4 p.m.    Download the EPA UV index carl to track the UV index by hour in your zip code.      Which sunscreen should I choose?  The best sunscreen to use varies by individual. The one that feels best on your skin and fits your lifestyle will be the one you will likely use most regularly.   Active ingredients of sunscreen vary by , and may be a chemical (such as avobenzone or oxybenzone) or physical agent (such as zinc oxide or titanium dioxide). I recommend a physical agent.  A water-resistant sunscreen is one that maintains the SPF level after 40 minutes of water exposure. A very water-resistant sunscreen maintains the SPF  "level after 80 minutes of water exposure.    Sunscreen: this is the last layer in sun protection   Be generous: 1 shot glass of sunscreen for your body, ½ teaspoon for your face/neck  Reapply every 2 hours  Broad spectrum (provides UVA/UVB protection), SPF 50 or above  Avoid spray sunscreens: less effective and have been found to contain benzene (carcinogen)    Sun protective clothing  Although sunscreen helps minimize sun damage, no sunscreen completely blocks all wavelengths of UV light. Wearing sun protective clothing such as hats, rashguards or swim shirts, and long sleeves and/or pants, as well as avoiding sun exposure from 10 a.m. to 4 p.m. will help protect your skin from overexposure and minimize sun damage. Seek shade.  Long sleeved clothing, hats, and sunglasses: makes sun protection easier, more effective, and can even be more affordable, since sunscreen needs to be reapplied frequently.    Solumbra (www.sunprectautions.com)  ExecMobile (www.[employer])  Coolibar (www.Travellutionr.GoEuro)  Land's end (www.Lattice [employer])  Hats from [employer] (www.helenkaminski.com)    My Favorite Sunscreens:  Physical blockers: Can have a "white case" but in general are more effective  - Face: CeraVe tinted mineral sunscreen, Bare Minerals complexion rescue (20 shades), Elta MD (UV elements, UV physical, UV restore, etc), Tizo ultra zinc tinted, Cetaphil Sheer Mineral Face Liquid Sunscreen  - Body: Blue Lizard, Neutrogena Sheer Zinc, Eucerin Daily Protection, Aveeno Baby   "

## 2024-02-27 NOTE — PROGRESS NOTES
Subjective:      Patient ID:  Tess Duncan is a 69 y.o. female who presents for   Chief Complaint   Patient presents with    Skin Check     Pt here today for UBSE    Pt denies personal hx of NMSC or MM. Pt states possible family hx of skin cancer.     Patient with new complaint of lesion(s)  Location: L collarbone  Duration: 5 years  Symptoms: scaly  Relieving factors/Previous treatments: none     Pt also concerned about new spots on back            Review of Systems   Skin:  Positive for daily sunscreen use and wears hat.   Hematologic/Lymphatic: Does not bruise/bleed easily.       Objective:   Physical Exam   Constitutional: She appears well-developed and well-nourished. No distress.   Neurological: She is alert and oriented to person, place, and time. She is not disoriented.   Psychiatric: She has a normal mood and affect.   Skin:   Areas Examined (abnormalities noted in diagram):   Head / Face Inspection Performed  Neck Inspection Performed  Chest / Axilla Inspection Performed  Back Inspection Performed  RUE Inspected  LUE Inspection Performed                 Diagram Legend     Erythematous scaling macule/papule c/w actinic keratosis       Vascular papule c/w angioma      Pigmented verrucoid papule/plaque c/w seborrheic keratosis      Yellow umbilicated papule c/w sebaceous hyperplasia      Irregularly shaped tan macule c/w lentigo     1-2 mm smooth white papules consistent with Milia      Movable subcutaneous cyst with punctum c/w epidermal inclusion cyst      Subcutaneous movable cyst c/w pilar cyst      Firm pink to brown papule c/w dermatofibroma      Pedunculated fleshy papule(s) c/w skin tag(s)      Evenly pigmented macule c/w junctional nevus     Mildly variegated pigmented, slightly irregular-bordered macule c/w mildly atypical nevus      Flesh colored to evenly pigmented papule c/w intradermal nevus       Pink pearly papule/plaque c/w basal cell carcinoma      Erythematous hyperkeratotic cursted  plaque c/w SCC      Surgical scar with no sign of skin cancer recurrence      Open and closed comedones      Inflammatory papules and pustules      Verrucoid papule consistent consistent with wart     Erythematous eczematous patches and plaques     Dystrophic onycholytic nail with subungual debris c/w onychomycosis     Umbilicated papule    Erythematous-base heme-crusted tan verrucoid plaque consistent with inflamed seborrheic keratosis     Erythematous Silvery Scaling Plaque c/w Psoriasis     See annotation      Assessment / Plan:        Lentigo  Seborrheic keratoses  Multiple benign nevi  Cherry angioma  Reassurance given to patient. No treatment is necessary.   Treatment of benign, asymptomatic lesions may be considered cosmetic.    Screening exam for skin cancer  Upper body skin examination performed today including at least 6 points as noted in physical examination. No lesions suspicious for malignancy noted.    Recommend daily sun protection/avoidance and use of at least SPF 30, broad spectrum sunscreen (OTC drug).     Scalp itching  Folliculitis  Reported scalp itching with unremarkable exam other than excoriations. She reports getting pimples  - CLn shampoo TIW  - dermeelve scalp solution  - clobetasol tiw         Follow up in about 2 years (around 2/27/2026) for UBSE.

## 2024-03-10 DIAGNOSIS — E23.0 PANHYPOPITUITARISM: ICD-10-CM

## 2024-03-10 DIAGNOSIS — E89.0 HYPOTHYROIDISM, POSTSURGICAL: ICD-10-CM

## 2024-03-11 RX ORDER — LEVOTHYROXINE SODIUM 88 UG/1
TABLET ORAL
Qty: 30 TABLET | Refills: 11 | Status: SHIPPED | OUTPATIENT
Start: 2024-03-11

## 2024-03-25 ENCOUNTER — PATIENT MESSAGE (OUTPATIENT)
Dept: ENDOCRINOLOGY | Facility: CLINIC | Age: 69
End: 2024-03-25
Payer: COMMERCIAL

## 2024-03-27 ENCOUNTER — PROCEDURE VISIT (OUTPATIENT)
Dept: UROLOGY | Facility: CLINIC | Age: 69
End: 2024-03-27
Attending: UROLOGY
Payer: COMMERCIAL

## 2024-03-27 VITALS
DIASTOLIC BLOOD PRESSURE: 78 MMHG | HEART RATE: 102 BPM | OXYGEN SATURATION: 96 % | RESPIRATION RATE: 16 BRPM | SYSTOLIC BLOOD PRESSURE: 123 MMHG

## 2024-03-27 DIAGNOSIS — C67.2 MALIGNANT NEOPLASM OF LATERAL WALL OF URINARY BLADDER: Primary | ICD-10-CM

## 2024-03-27 PROCEDURE — 52000 CYSTOURETHROSCOPY: CPT | Mod: S$GLB,,, | Performed by: UROLOGY

## 2024-03-27 RX ORDER — CIPROFLOXACIN 500 MG/1
500 TABLET ORAL
Status: COMPLETED | OUTPATIENT
Start: 2024-03-27 | End: 2024-03-27

## 2024-03-27 RX ORDER — LIDOCAINE HYDROCHLORIDE 20 MG/ML
JELLY TOPICAL
Status: COMPLETED | OUTPATIENT
Start: 2024-03-27 | End: 2024-03-27

## 2024-03-27 RX ORDER — LIDOCAINE HYDROCHLORIDE 20 MG/ML
JELLY TOPICAL
Status: CANCELLED | OUTPATIENT
Start: 2024-03-27 | End: 2024-03-27

## 2024-03-27 RX ORDER — ESTRADIOL 0.1 MG/G
CREAM VAGINAL
Qty: 42.5 G | Refills: 11 | Status: SHIPPED | OUTPATIENT
Start: 2024-03-27 | End: 2025-04-09

## 2024-03-27 RX ADMIN — LIDOCAINE HYDROCHLORIDE: 20 JELLY TOPICAL at 04:03

## 2024-03-27 RX ADMIN — CIPROFLOXACIN 500 MG: 500 TABLET ORAL at 04:03

## 2024-03-27 NOTE — PROCEDURES
Cystoscopy    Date/Time: 3/27/2024 3:20 PM    Performed by: Whitney Gee MD  Authorized by: Whitney Gee MD    Consent Done?:  Yes (Written)  Timeout: prior to procedure the correct patient, procedure, and site was verified    Prep: patient was prepped and draped in usual sterile fashion    Anesthesia:  Lidocaine jelly  Indications: history bladder cancer    Position:  Dorsal lithotomy  Anesthesia:  Lidocaine jelly  Patient sedated?: No    Preparation: Patient was prepped and draped in usual sterile fashion    Scope type:  Flexible cystoscope  Stent inserted: No    Stent removed: No    External exam normal: No (++retracted urethra, vaginal atrophy, narrow introitus)    Digital exam performed: Yes    Urethra normal: No    Urethral: Significantly retracted.  Bladder neck normal: Yes    Bladder normal: Yes (No tumors.)     patient tolerated the procedure well with no immediate complications  Comments:        Difficulty in placing cystoscope due to urethral retraction/atrophy. Recommend starting vaginal estrogen. Ordered    Repeat cysto in 1 year.

## 2024-04-02 DIAGNOSIS — M81.8 OSTEOPOROSIS, IDIOPATHIC: ICD-10-CM

## 2024-04-02 DIAGNOSIS — E23.0 PANHYPOPITUITARISM: ICD-10-CM

## 2024-04-02 RX ORDER — PREDNISONE 5 MG/1
TABLET ORAL
Qty: 45 TABLET | Refills: 11 | Status: SHIPPED | OUTPATIENT
Start: 2024-04-02

## 2024-06-07 RX ORDER — ACETAMINOPHEN 325 MG/1
650 TABLET ORAL
OUTPATIENT
Start: 2024-06-20

## 2024-06-07 RX ORDER — HEPARIN 100 UNIT/ML
500 SYRINGE INTRAVENOUS
OUTPATIENT
Start: 2024-06-20

## 2024-06-07 RX ORDER — SODIUM CHLORIDE 0.9 % (FLUSH) 0.9 %
10 SYRINGE (ML) INJECTION
OUTPATIENT
Start: 2024-06-20

## 2024-06-07 RX ORDER — ZOLEDRONIC ACID 5 MG/100ML
5 INJECTION, SOLUTION INTRAVENOUS
OUTPATIENT
Start: 2024-06-20

## 2024-06-17 ENCOUNTER — PATIENT MESSAGE (OUTPATIENT)
Dept: ADMINISTRATIVE | Facility: HOSPITAL | Age: 69
End: 2024-06-17
Payer: COMMERCIAL

## 2024-06-17 ENCOUNTER — PATIENT OUTREACH (OUTPATIENT)
Dept: ADMINISTRATIVE | Facility: HOSPITAL | Age: 69
End: 2024-06-17
Payer: COMMERCIAL

## 2024-06-17 DIAGNOSIS — Z12.12 ENCOUNTER FOR COLORECTAL CANCER SCREENING: Primary | ICD-10-CM

## 2024-06-17 DIAGNOSIS — Z12.11 ENCOUNTER FOR COLORECTAL CANCER SCREENING: Primary | ICD-10-CM

## 2024-06-17 NOTE — PROGRESS NOTES
Health Maintenance Due   Topic Date Due    Shingles Vaccine (1 of 2) Never done    Pneumococcal Vaccines (Age 65+) (2 of 2 - PCV) 02/02/2005    RSV Vaccine (Age 60+ and Pregnant patients) (1 - 1-dose 60+ series) Never done    COVID-19 Vaccine (4 - 2023-24 season) 09/01/2023   Health Maintenance reviewed, updated and links triggered. Colorectal screening due PAT appt 8/23/24   At patient request assistance with scheduling. (Fford) 6/17/24

## 2024-06-20 ENCOUNTER — INFUSION (OUTPATIENT)
Dept: INFECTIOUS DISEASES | Facility: HOSPITAL | Age: 69
End: 2024-06-20
Payer: COMMERCIAL

## 2024-06-20 VITALS
TEMPERATURE: 98 F | BODY MASS INDEX: 26.69 KG/M2 | HEIGHT: 60 IN | OXYGEN SATURATION: 97 % | RESPIRATION RATE: 16 BRPM | HEART RATE: 80 BPM | SYSTOLIC BLOOD PRESSURE: 119 MMHG | WEIGHT: 135.94 LBS | DIASTOLIC BLOOD PRESSURE: 66 MMHG

## 2024-06-20 DIAGNOSIS — M81.8 OSTEOPOROSIS, IDIOPATHIC: Primary | ICD-10-CM

## 2024-06-20 PROCEDURE — 96365 THER/PROPH/DIAG IV INF INIT: CPT

## 2024-06-20 PROCEDURE — 63600175 PHARM REV CODE 636 W HCPCS

## 2024-06-20 RX ORDER — ACETAMINOPHEN 325 MG/1
650 TABLET ORAL
OUTPATIENT
Start: 2024-06-20

## 2024-06-20 RX ORDER — ZOLEDRONIC ACID 5 MG/100ML
5 INJECTION, SOLUTION INTRAVENOUS
Status: COMPLETED | OUTPATIENT
Start: 2024-06-20 | End: 2024-06-20

## 2024-06-20 RX ORDER — HEPARIN 100 UNIT/ML
500 SYRINGE INTRAVENOUS
Status: DISCONTINUED | OUTPATIENT
Start: 2024-06-20 | End: 2024-06-20 | Stop reason: HOSPADM

## 2024-06-20 RX ORDER — SODIUM CHLORIDE 0.9 % (FLUSH) 0.9 %
10 SYRINGE (ML) INJECTION
Status: DISCONTINUED | OUTPATIENT
Start: 2024-06-20 | End: 2024-06-20 | Stop reason: HOSPADM

## 2024-06-20 RX ORDER — SODIUM CHLORIDE 0.9 % (FLUSH) 0.9 %
10 SYRINGE (ML) INJECTION
OUTPATIENT
Start: 2024-06-20

## 2024-06-20 RX ORDER — HEPARIN 100 UNIT/ML
500 SYRINGE INTRAVENOUS
OUTPATIENT
Start: 2024-06-20

## 2024-06-20 RX ORDER — ZOLEDRONIC ACID 5 MG/100ML
5 INJECTION, SOLUTION INTRAVENOUS
OUTPATIENT
Start: 2024-06-20

## 2024-06-20 RX ORDER — ACETAMINOPHEN 325 MG/1
650 TABLET ORAL
Status: DISCONTINUED | OUTPATIENT
Start: 2024-06-20 | End: 2024-06-20 | Stop reason: HOSPADM

## 2024-06-20 RX ADMIN — ZOLEDRONIC ACID 5 MG: 5 INJECTION, SOLUTION INTRAVENOUS at 03:06

## 2024-06-20 NOTE — PROGRESS NOTES
Limited head-to-toe assessment due to privacy issues and visit reason though the opportunity was given for patient to express any concerns     Patient arrives for infusion of reclast as ordered by Adelaida. All benefits, risks, requirements, and alternatives should have been discussed with patient by ordering provider at the time the order was placed.      Pt. Arrived for reclast infusion over 30 minutes. Pt. Tolerated well without difficulty

## 2024-06-24 ENCOUNTER — PATIENT MESSAGE (OUTPATIENT)
Dept: ENDOCRINOLOGY | Facility: CLINIC | Age: 69
End: 2024-06-24
Payer: COMMERCIAL

## 2024-06-24 DIAGNOSIS — E23.0 PANHYPOPITUITARISM: Primary | ICD-10-CM

## 2024-06-24 DIAGNOSIS — E89.0 HYPOTHYROIDISM, POSTSURGICAL: ICD-10-CM

## 2024-06-24 DIAGNOSIS — E27.1 ADRENAL INSUFFICIENCY (ADDISON'S DISEASE): ICD-10-CM

## 2024-06-26 ENCOUNTER — LAB VISIT (OUTPATIENT)
Dept: LAB | Facility: OTHER | Age: 69
End: 2024-06-26
Attending: INTERNAL MEDICINE
Payer: COMMERCIAL

## 2024-06-26 DIAGNOSIS — E89.0 HYPOTHYROIDISM, POSTSURGICAL: ICD-10-CM

## 2024-06-26 DIAGNOSIS — E27.1 ADRENAL INSUFFICIENCY (ADDISON'S DISEASE): ICD-10-CM

## 2024-06-26 DIAGNOSIS — E23.0 PANHYPOPITUITARISM: ICD-10-CM

## 2024-06-26 LAB
DHEA-S SERPL-MCNC: 94.1 UG/DL (ref 33.6–78.9)
T4 FREE SERPL-MCNC: 1.48 NG/DL (ref 0.71–1.51)
TSH SERPL DL<=0.005 MIU/L-ACNC: 0.87 UIU/ML (ref 0.4–4)

## 2024-06-26 PROCEDURE — 84443 ASSAY THYROID STIM HORMONE: CPT | Performed by: INTERNAL MEDICINE

## 2024-06-26 PROCEDURE — 84439 ASSAY OF FREE THYROXINE: CPT | Performed by: INTERNAL MEDICINE

## 2024-06-26 PROCEDURE — 82626 DEHYDROEPIANDROSTERONE: CPT | Performed by: INTERNAL MEDICINE

## 2024-06-26 PROCEDURE — 82627 DEHYDROEPIANDROSTERONE: CPT | Performed by: INTERNAL MEDICINE

## 2024-06-28 ENCOUNTER — HOSPITAL ENCOUNTER (OUTPATIENT)
Dept: RADIOLOGY | Facility: OTHER | Age: 69
Discharge: HOME | End: 2024-06-28
Attending: INTERNAL MEDICINE
Payer: COMMERCIAL

## 2024-06-28 DIAGNOSIS — Z12.31 ENCOUNTER FOR SCREENING MAMMOGRAM FOR BREAST CANCER: ICD-10-CM

## 2024-06-28 PROCEDURE — 77067 SCR MAMMO BI INCL CAD: CPT | Mod: 26,,, | Performed by: RADIOLOGY

## 2024-06-28 PROCEDURE — 77067 SCR MAMMO BI INCL CAD: CPT | Mod: TC

## 2024-06-28 PROCEDURE — 77063 BREAST TOMOSYNTHESIS BI: CPT | Mod: 26,,, | Performed by: RADIOLOGY

## 2024-06-30 LAB — DHEA SERPL-MCNC: 0.75 NG/ML (ref 0.63–4.7)

## 2024-07-01 ENCOUNTER — PATIENT MESSAGE (OUTPATIENT)
Dept: ENDOCRINOLOGY | Facility: CLINIC | Age: 69
End: 2024-07-01
Payer: COMMERCIAL

## 2024-07-10 ENCOUNTER — OFFICE VISIT (OUTPATIENT)
Dept: OBSTETRICS AND GYNECOLOGY | Facility: CLINIC | Age: 69
End: 2024-07-10
Payer: COMMERCIAL

## 2024-07-10 VITALS — WEIGHT: 139.25 LBS | HEIGHT: 61 IN | BODY MASS INDEX: 26.29 KG/M2

## 2024-07-10 DIAGNOSIS — Z12.4 ENCOUNTER FOR PAPANICOLAOU SMEAR FOR CERVICAL CANCER SCREENING: ICD-10-CM

## 2024-07-10 DIAGNOSIS — Z11.51 ENCOUNTER FOR SCREENING FOR HUMAN PAPILLOMAVIRUS (HPV): ICD-10-CM

## 2024-07-10 DIAGNOSIS — Z01.419 ENCOUNTER FOR WELL WOMAN EXAM WITH ROUTINE GYNECOLOGICAL EXAM: Primary | ICD-10-CM

## 2024-07-10 PROCEDURE — 3288F FALL RISK ASSESSMENT DOCD: CPT | Mod: CPTII,S$GLB,,

## 2024-07-10 PROCEDURE — 99999 PR PBB SHADOW E&M-EST. PATIENT-LVL III: CPT | Mod: PBBFAC,,,

## 2024-07-10 PROCEDURE — 1101F PT FALLS ASSESS-DOCD LE1/YR: CPT | Mod: CPTII,S$GLB,,

## 2024-07-10 PROCEDURE — 3008F BODY MASS INDEX DOCD: CPT | Mod: CPTII,S$GLB,,

## 2024-07-10 PROCEDURE — 87624 HPV HI-RISK TYP POOLED RSLT: CPT

## 2024-07-10 PROCEDURE — 1159F MED LIST DOCD IN RCRD: CPT | Mod: CPTII,S$GLB,,

## 2024-07-10 PROCEDURE — 99387 INIT PM E/M NEW PAT 65+ YRS: CPT | Mod: S$GLB,,,

## 2024-07-10 NOTE — PROGRESS NOTES
"Chief Complaint: Well Woman Exam     HPI:      Tess Duncan is a 69 y.o.  who presents today for well woman exam. She is new to me. No significant gyn hx.  Other PMH includes bladder cancer, recurrent UTIs, cushings disease s/p bilateral adrenalectomy, osteoporosis, postsurgical hypothyroidism, and hemangioma of liver.  LMP: No LMP recorded. Patient is postmenopausal.  No issues, problems, or complaints. Specifically, patient denies abnormal vaginal bleeding, discharge, pelvic pain, urinary problems, or changes in appetite. Ms. Duncan is not currently sexually active.     Previous Pap: NILM (7/10/2024)  Previous Mammogram: BiRads: 1 T-C Score: 5.29% (2024)    Most Recent Dexa: Osteoporosis, pt on Reclast per endocrine  (2023), Repeat in   Most Recent Colonoscopy: DUE    STD/STI Hx: Denies any history of STD's  Tobacco use:  Never  Alcohol use: Yes - one glass of wine a night  Exercise regimen: No - planning on starting a walking regimen  Employment: Yes - at [employer] - planning on custodial in August this year!    FH:  Breast cancer: maternal aunt  Colon cancer: none  Ovarian cancer: none  Endometrial cancer: none    OB History          0    Para   0    Term   0       0    AB   0    Living   0         SAB   0    IAB   0    Ectopic   0    Multiple   0    Live Births                     ROS:     GENERAL: Denies unintentional weight gain or weight loss. Feeling well overall.   SKIN: Denies rash or lesions.   HEENT: Denies headaches, or vision changes.   CARDIOVASCULAR: Denies palpitations or chest pain.   RESPIRATORY: Denies shortness of breath or dyspnea on exertion.  BREASTS: Denies lumps or nipple discharge.   ABDOMEN: Denies constipation, diarrhea, nausea, vomiting, change in appetite.  URINARY: Denies frequency, dysuria.  NEUROLOGIC: Denies syncope or weakness.   PSYCHIATRIC: Denies uncontrolled depression or anxiety.    Physical Exam:      PHYSICAL EXAM:  Ht 5' 1" (1.549 " m)   Wt 63.2 kg (139 lb 3.5 oz)   BMI 26.31 kg/m²   Body mass index is 26.31 kg/m².     APPEARANCE: Well nourished, well developed, in no acute distress.  PSYCH: Appropriate mood and affect.  SKIN: No acne or hirsutism  NECK: Neck symmetric without masses  NODES: No inguinal, axillary, or supraclavicular lymph node enlargement  ABDOMEN: Soft.  No tenderness or masses.    CARDIOVASCULAR: No edema of peripheral extremities  BREASTS: Symmetrical, no visible skin lesions. No palpable masses. No nipple discharge bilaterally.  PELVIC: Normal external genitalia without lesions.  Normal hair distribution.  Adequate perineal body, normal urethral meatus.  Vagina moist and smooth. Without lesions. Vagina without discharge.  Cervix pink, without lesions, discharge or tenderness.  No significant cystocele or rectocele.  Bimanual exam shows uterus to be normal size, regular, mobile and nontender.  Adnexa without masses or tenderness.    Note: Atrophic    Assessment/Plan:     Encounter for well woman exam with routine gynecological exam  -     Counseled patient regarding healthy diet and regular exercise, daily multivitamin, daily seat belt use.   -     BP normotensive  -     She denies abuse and feels safe at home.  -     Pap smear:  Performed   -     MMG:  UTD   -     Colonoscopy: DUE    -     DEXA screening:  UTD - osteoporosis on meds - managed by endocrine    Encounter for screening for human papillomavirus (HPV)  -     HPV High Risk Genotypes, PCR    Encounter for Papanicolaou smear for cervical cancer screening  -     Liquid-Based Pap Smear, Screening    Follow up in about 1 year for annual exam or sooner PRN.    Counseling:     Patient was counseled today on current ASCCP pap guidelines, the recommendation for yearly physical exams, healthy diet and exercise routines, safe driving habits, and breast self awareness and annual mammograms. She is to see her PCP for other health maintenance.     Use of the [employer] Patient  Portal discussed and encouraged during today's visit.   Counseling time: 15 minutes    Sarah Keenan DNP (Maggie)  Obstetrics and Gynecology  [facility]

## 2024-08-01 ENCOUNTER — PATIENT MESSAGE (OUTPATIENT)
Dept: ENDOCRINOLOGY | Facility: CLINIC | Age: 69
End: 2024-08-01
Payer: COMMERCIAL

## 2024-09-05 ENCOUNTER — PATIENT MESSAGE (OUTPATIENT)
Dept: ENDOCRINOLOGY | Facility: CLINIC | Age: 69
End: 2024-09-05
Payer: COMMERCIAL

## 2025-01-07 ENCOUNTER — PATIENT MESSAGE (OUTPATIENT)
Dept: ENDOCRINOLOGY | Facility: CLINIC | Age: 70
End: 2025-01-07
Payer: COMMERCIAL

## 2025-01-07 DIAGNOSIS — E23.0 PANHYPOPITUITARISM: ICD-10-CM

## 2025-01-07 DIAGNOSIS — M81.8 OSTEOPOROSIS, IDIOPATHIC: ICD-10-CM

## 2025-01-07 RX ORDER — FLUDROCORTISONE ACETATE 0.1 MG/1
100 TABLET ORAL DAILY
Qty: 30 TABLET | Refills: 11 | Status: SHIPPED | OUTPATIENT
Start: 2025-01-07

## 2025-03-04 ENCOUNTER — PATIENT MESSAGE (OUTPATIENT)
Dept: ENDOCRINOLOGY | Facility: CLINIC | Age: 70
End: 2025-03-04
Payer: COMMERCIAL

## 2025-03-04 DIAGNOSIS — E27.1 ADRENAL INSUFFICIENCY (ADDISON'S DISEASE): ICD-10-CM

## 2025-03-04 DIAGNOSIS — E89.0 HYPOTHYROIDISM, POSTSURGICAL: ICD-10-CM

## 2025-03-04 DIAGNOSIS — E23.0 PANHYPOPITUITARISM: Primary | ICD-10-CM

## 2025-03-09 DIAGNOSIS — E89.0 HYPOTHYROIDISM, POSTSURGICAL: ICD-10-CM

## 2025-03-09 DIAGNOSIS — E23.0 PANHYPOPITUITARISM: ICD-10-CM

## 2025-03-10 RX ORDER — LEVOTHYROXINE SODIUM 88 UG/1
88 TABLET ORAL
Qty: 30 TABLET | Refills: 11 | Status: SHIPPED | OUTPATIENT
Start: 2025-03-10

## 2025-03-17 ENCOUNTER — PATIENT MESSAGE (OUTPATIENT)
Dept: ADMINISTRATIVE | Facility: HOSPITAL | Age: 70
End: 2025-03-17
Payer: COMMERCIAL

## 2025-03-18 ENCOUNTER — PATIENT OUTREACH (OUTPATIENT)
Dept: ADMINISTRATIVE | Facility: HOSPITAL | Age: 70
End: 2025-03-18
Payer: COMMERCIAL

## 2025-03-28 ENCOUNTER — RESULTS FOLLOW-UP (OUTPATIENT)
Dept: ENDOCRINOLOGY | Facility: CLINIC | Age: 70
End: 2025-03-28

## 2025-03-28 ENCOUNTER — LAB VISIT (OUTPATIENT)
Dept: LAB | Facility: OTHER | Age: 70
End: 2025-03-28
Attending: INTERNAL MEDICINE
Payer: MEDICARE

## 2025-03-28 ENCOUNTER — PATIENT MESSAGE (OUTPATIENT)
Dept: ENDOCRINOLOGY | Facility: CLINIC | Age: 70
End: 2025-03-28
Payer: COMMERCIAL

## 2025-03-28 DIAGNOSIS — E89.0 HYPOTHYROIDISM, POSTSURGICAL: ICD-10-CM

## 2025-03-28 DIAGNOSIS — E23.0 PANHYPOPITUITARISM: ICD-10-CM

## 2025-03-28 DIAGNOSIS — E27.1 ADRENAL INSUFFICIENCY (ADDISON'S DISEASE): ICD-10-CM

## 2025-03-28 LAB
DHEA-S SERPL-MCNC: 144.3 UG/DL (ref 33.6–78.9)
T4 FREE SERPL-MCNC: 1.28 NG/DL (ref 0.71–1.51)
TSH SERPL-ACNC: 0.38 UIU/ML (ref 0.4–4)

## 2025-03-28 PROCEDURE — 84439 ASSAY OF FREE THYROXINE: CPT

## 2025-03-28 PROCEDURE — 84439 ASSAY OF FREE THYROXINE: CPT | Mod: 59

## 2025-03-28 PROCEDURE — 82627 DEHYDROEPIANDROSTERONE: CPT

## 2025-03-28 PROCEDURE — 36415 COLL VENOUS BLD VENIPUNCTURE: CPT

## 2025-04-02 LAB — RENIN PLAS-CCNC: 4.2 NG/ML/H

## 2025-04-02 NOTE — PROGRESS NOTES
Subjective:      Patient ID: Tess Duncan is a 70 y.o. female.    Chief Complaint:  No chief complaint on file.      History of Present Illness  Ms. Duncan presents for follow up of panhypopituitarism and osteoporosis. Last visit 1/2024 with Dr. Dexter.    No significant medical changes since last visit.       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      # Status post Cushing's disease with bilateral adrenalectomy.   # Radiation to the pituitary to avoid Dakota syndrome.    # Growth hormone deficiency. Off of GH now. GH was discontinued when diagnosed with bladder cancer. IGF-1 levels now fluctuate between normal and slightly low.  # Osteoporosis and scoliosis.   # Status post thyroidectomy in 1/2009 with benign goiter, was started on thyroid hormone at that time  Father with thyroid cancer       No headaches  Does report double vision seen by optometry in 2018, noted to have intermittent diplopia on far right gaze and on far left gaze, likely secondary to decompensated esophoria     Takes prednisone 5 mg most day, will occasiaionally takes and additional 1.25 mg if not feeling well (rare)  Florinef 100 mcg 4 days per week and 50 mcg 3 days per week  No hypotension. Normal appetite. No dizziness or lightheadedness.   Understands sick day precautions. Emergency dexamethasone script reordered today.   Taking DHEA 10 mg daily.      Osteoporosis    After her  bone density in 12/2022 she was noted to have increased BMD at L2 and subsequent x ray revealed a compression fracture at that vertebrae. She does not recall having any specific trauma to that area. She does get intermittent low back pain so hard to tell when fracture occurred.     Previously on fosamax since 2/2017, but was started on Reclast and received doses in 6/2021 and 6/2022. We held her last dose of Reclast in 6/2023 as she was considering having a dental  surgery later decided to forgo this and had her 3rd Reclast dose in Jan 2024.  Her last bone density in 12/2023 showed stability at the spine and hip.     Taking calcium and vitamin-D supplementation.    No recent falls or fractures.      Review of Systems   Constitutional:  Negative for chills and fever.   Gastrointestinal:  Negative for nausea.       Objective:     Vitals:    04/04/25 0926   BP: 130/78   Pulse: 77   Resp: 18     Physical Exam  Constitutional:       General: She is not in acute distress.     Appearance: Normal appearance.   Eyes:      Extraocular Movements: Extraocular movements intact.   Cardiovascular:      Rate and Rhythm: Normal rate.   Skin:     General: Skin is warm and dry.   Neurological:      Mental Status: She is oriented to person, place, and time.   Psychiatric:         Behavior: Behavior normal.       BP Readings from Last 3 Encounters:   06/20/24 119/66   03/27/24 123/78   02/14/24 129/68     Wt Readings from Last 1 Encounters:   07/10/24 1528 63.2 kg (139 lb 3.5 oz)         Lab Review:   Lab Results   Component Value Date    HGBA1C 5.1 06/26/2023     Lab Results   Component Value Date    CHOL 231 (H) 06/26/2023    HDL 69 06/26/2023    LDLCALC 139.2 06/26/2023    TRIG 114 06/26/2023    CHOLHDL 29.9 06/26/2023     Lab Results   Component Value Date     06/26/2023    K 4.5 06/26/2023     06/26/2023    CO2 26 06/26/2023    GLU 80 06/26/2023    BUN 23 06/26/2023    CREATININE 0.9 06/26/2023    CALCIUM 9.2 06/26/2023    PROT 6.7 06/26/2023    ALBUMIN 4.2 06/26/2023    BILITOT 0.4 06/26/2023    ALKPHOS 54 (L) 06/26/2023    AST 27 06/26/2023    ALT 23 06/26/2023    ANIONGAP 7 (L) 06/26/2023    ESTGFRAFRICA >60 05/21/2021    EGFRNONAA >60 05/21/2021    TSH 0.377 (L) 03/28/2025     Lab Results   Component Value Date    TSH 0.377 (L) 03/28/2025    FREET4 1.28 03/28/2025    DHEASO4 144.3 (H) 03/28/2025         Assessment and Plan   Panhypopituitarism    Cushing's disease s/p  radiation and b/l adrenalectomy in 1972  On prednisone and florinef replacement  Was on GH in past but stopped once diagnosed with bladder cancer and IGF-1 levels have been low normal, latest levels pending.  Continue DHEAS 10 mg daily.      Osteoporosis, idiopathic    Had spontaneous age indeterminate compression fracture of L2 first noted in 12/2022    Was on Fosamax from 2017 to 6/2021  S/p Reclast x 3 in 6/2021, 6/2022, 1/2024    Starting an anabolic agent given L2 fracture was discussed previously but she would like to see what her next bone density looks like before making a decision    Continue calcium and vitamin-D supplementation.    Weight-bearing exercise as tolerated.    Fall precautions emphasized.    Repeat BMD in 12/2025          Adrenal insufficiency ([place]'s disease)    Adrenal insufficiency due to radiation to pituitary and b/l adrenalectomy   On prednisone 5 mg daily most days, occ takes 1.25mg if having more stress on a given day   Knows sick day management   Emergency dexamethasone reordered today,hasn't had to use for years    Recommend to continue taking prednisone 5 mg daily, can take additional 1/4 of a tablet (1.25 mg) if needed. Continue with current florinef dose  (100 mcg 4 days per week and 50 mcg 3 days per week); goal renin >1.        Hypothyroidism, postsurgical    -hypothyroidism post thyroidectomy   -most recent fT4 wnl.  -continue levothyroxine 88 mcg daily          RTC 1 year with labs- TSH, free T4, renin, DHEA-S, IGF-1, CMP prior to visit.    Patient seen, examined and discussed with Dr. Schilling.      Visit today included increased complexity associated with the care of the problems addressed and managing the longitudinal care of the patient due to the serious and/or complex managed problems

## 2025-04-04 ENCOUNTER — OFFICE VISIT (OUTPATIENT)
Dept: ENDOCRINOLOGY | Facility: CLINIC | Age: 70
End: 2025-04-04
Payer: MEDICARE

## 2025-04-04 VITALS
RESPIRATION RATE: 18 BRPM | BODY MASS INDEX: 25.83 KG/M2 | DIASTOLIC BLOOD PRESSURE: 78 MMHG | SYSTOLIC BLOOD PRESSURE: 130 MMHG | HEIGHT: 61 IN | HEART RATE: 77 BPM | WEIGHT: 136.81 LBS

## 2025-04-04 DIAGNOSIS — E27.1 ADRENAL INSUFFICIENCY (ADDISON'S DISEASE): ICD-10-CM

## 2025-04-04 DIAGNOSIS — E89.0 HYPOTHYROIDISM, POSTSURGICAL: ICD-10-CM

## 2025-04-04 DIAGNOSIS — M81.8 OSTEOPOROSIS, IDIOPATHIC: ICD-10-CM

## 2025-04-04 DIAGNOSIS — E23.0 PANHYPOPITUITARISM: Primary | ICD-10-CM

## 2025-04-04 PROCEDURE — 99999 PR PBB SHADOW E&M-EST. PATIENT-LVL IV: CPT | Mod: PBBFAC,GC,,

## 2025-04-04 PROCEDURE — 99214 OFFICE O/P EST MOD 30 MIN: CPT | Mod: PBBFAC

## 2025-04-04 RX ORDER — DEXAMETHASONE SODIUM PHOSPHATE 4 MG/ML
4 INJECTION, SOLUTION INTRA-ARTICULAR; INTRALESIONAL; INTRAMUSCULAR; INTRAVENOUS; SOFT TISSUE
Qty: 1 ML | Refills: 1 | Status: SHIPPED | OUTPATIENT
Start: 2025-04-04

## 2025-04-04 NOTE — ASSESSMENT & PLAN NOTE
Adrenal insufficiency due to radiation to pituitary and b/l adrenalectomy   On prednisone 5 mg daily most days, occ takes 1.25mg if having more stress on a given day   Knows sick day management   Emergency dexamethasone reordered today,hasn't had to use for years    Recommend to continue taking prednisone 5 mg daily, can take additional 1/4 of a tablet (1.25 mg) if needed. Continue with current florinef dose  (100 mcg 4 days per week and 50 mcg 3 days per week); goal renin >1.

## 2025-04-04 NOTE — ASSESSMENT & PLAN NOTE
-hypothyroidism post thyroidectomy   -most recent fT4 wnl.  -continue levothyroxine 88 mcg daily

## 2025-04-04 NOTE — ASSESSMENT & PLAN NOTE
Cushing's disease s/p radiation and b/l adrenalectomy in 1972  On prednisone and florinef replacement  Was on GH in past but stopped once diagnosed with bladder cancer and IGF-1 levels have been low normal, latest levels pending.  Continue DHEAS 10 mg daily.

## 2025-04-04 NOTE — ASSESSMENT & PLAN NOTE
Had spontaneous age indeterminate compression fracture of L2 first noted in 12/2022    Was on Fosamax from 2017 to 6/2021  S/p Reclast x 3 in 6/2021, 6/2022, 1/2024    Starting an anabolic agent given L2 fracture was discussed previously but she would like to see what her next bone density looks like before making a decision    Continue calcium and vitamin-D supplementation.    Weight-bearing exercise as tolerated.    Fall precautions emphasized.    Repeat BMD in 12/2025

## 2025-04-14 DIAGNOSIS — E23.0 PANHYPOPITUITARISM: ICD-10-CM

## 2025-04-14 DIAGNOSIS — M81.8 OSTEOPOROSIS, IDIOPATHIC: ICD-10-CM

## 2025-04-14 RX ORDER — PREDNISONE 5 MG/1
TABLET ORAL
Qty: 45 TABLET | Refills: 11 | Status: SHIPPED | OUTPATIENT
Start: 2025-04-14

## (undated) DEVICE — SET CYSTO IRRIGATION UNIV SPIK

## (undated) DEVICE — SOL IRR WATER STRL 3000 ML

## (undated) DEVICE — CORD CONNECT ELECTRO ENDO 10

## (undated) DEVICE — STERILE WATER 1000ML BOTTLE

## (undated) DEVICE — Device

## (undated) DEVICE — SET IRR URLGY 2LINE UNIV SPIKE

## (undated) DEVICE — GLOVE BIOGEL SENSOR SZ 6.5

## (undated) DEVICE — SOL NS 1000CC

## (undated) DEVICE — ELECTRODE REM PLYHSV RETURN 9

## (undated) DEVICE — DRESSING TELFA N ADH 3X8

## (undated) DEVICE — SYR CATHETER TIP 60ML

## (undated) DEVICE — CATH POLLACK OPEN-END FLEXI-TI

## (undated) DEVICE — CATH CONE TIP